# Patient Record
Sex: FEMALE | Race: BLACK OR AFRICAN AMERICAN | Employment: UNEMPLOYED | URBAN - METROPOLITAN AREA
[De-identification: names, ages, dates, MRNs, and addresses within clinical notes are randomized per-mention and may not be internally consistent; named-entity substitution may affect disease eponyms.]

---

## 2018-01-01 ENCOUNTER — TELEPHONE (OUTPATIENT)
Dept: FAMILY MEDICINE CLINIC | Age: 63
End: 2018-01-01

## 2018-01-01 ENCOUNTER — OFFICE VISIT (OUTPATIENT)
Dept: FAMILY MEDICINE CLINIC | Age: 63
End: 2018-01-01

## 2018-01-01 ENCOUNTER — HOME CARE VISIT (OUTPATIENT)
Dept: HOME HEALTH SERVICES | Facility: HOME HEALTH | Age: 63
End: 2018-01-01

## 2018-01-01 ENCOUNTER — HOME HEALTH ADMISSION (OUTPATIENT)
Dept: HOME HEALTH SERVICES | Facility: HOME HEALTH | Age: 63
End: 2018-01-01

## 2018-01-01 VITALS
WEIGHT: 115 LBS | SYSTOLIC BLOOD PRESSURE: 113 MMHG | TEMPERATURE: 96.4 F | OXYGEN SATURATION: 96 % | HEART RATE: 104 BPM | HEIGHT: 60 IN | BODY MASS INDEX: 22.58 KG/M2 | RESPIRATION RATE: 16 BRPM | DIASTOLIC BLOOD PRESSURE: 83 MMHG

## 2018-01-01 VITALS
DIASTOLIC BLOOD PRESSURE: 65 MMHG | WEIGHT: 116 LBS | HEART RATE: 98 BPM | HEIGHT: 60 IN | BODY MASS INDEX: 22.78 KG/M2 | TEMPERATURE: 98.3 F | SYSTOLIC BLOOD PRESSURE: 91 MMHG | RESPIRATION RATE: 18 BRPM | OXYGEN SATURATION: 100 %

## 2018-01-01 DIAGNOSIS — E55.9 HYPOVITAMINOSIS D: ICD-10-CM

## 2018-01-01 DIAGNOSIS — E11.65 TYPE 2 DIABETES MELLITUS WITH HYPERGLYCEMIA, WITH LONG-TERM CURRENT USE OF INSULIN (HCC): Primary | ICD-10-CM

## 2018-01-01 DIAGNOSIS — Z79.4 TYPE 2 DIABETES MELLITUS WITH HYPERGLYCEMIA, WITH LONG-TERM CURRENT USE OF INSULIN (HCC): Primary | ICD-10-CM

## 2018-01-01 DIAGNOSIS — R53.81 PHYSICAL DEBILITY: ICD-10-CM

## 2018-01-01 DIAGNOSIS — R05.9 COUGH: ICD-10-CM

## 2018-01-01 DIAGNOSIS — C25.9 MALIGNANT NEOPLASM OF PANCREAS, UNSPECIFIED LOCATION OF MALIGNANCY (HCC): Primary | ICD-10-CM

## 2018-01-01 DIAGNOSIS — Z79.4 TYPE 2 DIABETES MELLITUS WITH HYPERGLYCEMIA, WITH LONG-TERM CURRENT USE OF INSULIN (HCC): ICD-10-CM

## 2018-01-01 DIAGNOSIS — C25.9 MALIGNANT NEOPLASM OF PANCREAS, UNSPECIFIED LOCATION OF MALIGNANCY (HCC): ICD-10-CM

## 2018-01-01 DIAGNOSIS — Z11.59 NEED FOR HEPATITIS C SCREENING TEST: ICD-10-CM

## 2018-01-01 DIAGNOSIS — F39 MOOD DISORDER (HCC): ICD-10-CM

## 2018-01-01 DIAGNOSIS — E11.65 TYPE 2 DIABETES MELLITUS WITH HYPERGLYCEMIA, WITH LONG-TERM CURRENT USE OF INSULIN (HCC): ICD-10-CM

## 2018-01-01 RX ORDER — ERGOCALCIFEROL 1.25 MG/1
CAPSULE ORAL
Refills: 0 | COMMUNITY
Start: 2018-01-01 | End: 2018-01-01 | Stop reason: SDUPTHER

## 2018-01-01 RX ORDER — ERGOCALCIFEROL 1.25 MG/1
50000 CAPSULE ORAL
Qty: 13 CAP | Refills: 1 | Status: SHIPPED | OUTPATIENT
Start: 2018-01-01 | End: 2019-01-01 | Stop reason: SDUPTHER

## 2018-01-01 RX ORDER — PEN NEEDLE, DIABETIC 30 GX3/16"
NEEDLE, DISPOSABLE MISCELLANEOUS
Qty: 100 PACKAGE | Refills: 3 | Status: SHIPPED | OUTPATIENT
Start: 2018-01-01 | End: 2019-01-01

## 2018-01-01 RX ORDER — GUAIFENESIN DEXTROMETHORPHAN HYDROBROMIDE ORAL SOLUTION 10; 100 MG/5ML; MG/5ML
10 SOLUTION ORAL
Qty: 240 ML | Refills: 0 | Status: SHIPPED | OUTPATIENT
Start: 2018-01-01 | End: 2019-01-01

## 2018-01-01 RX ORDER — BUSPIRONE HYDROCHLORIDE 10 MG/1
10 TABLET ORAL 3 TIMES DAILY
Qty: 90 TAB | Refills: 1 | Status: SHIPPED | OUTPATIENT
Start: 2018-01-01 | End: 2019-01-01 | Stop reason: SDUPTHER

## 2018-01-01 RX ORDER — BUSPIRONE HYDROCHLORIDE 5 MG/1
TABLET ORAL
Refills: 0 | COMMUNITY
Start: 2018-01-01 | End: 2018-01-01 | Stop reason: DRUGHIGH

## 2018-01-01 RX ORDER — EMPAGLIFLOZIN 10 MG/1
10 TABLET, FILM COATED ORAL DAILY
Qty: 30 TAB | Refills: 1 | Status: SHIPPED | OUTPATIENT
Start: 2018-01-01 | End: 2019-01-01 | Stop reason: SDUPTHER

## 2018-01-01 RX ORDER — EMPAGLIFLOZIN 10 MG/1
TABLET, FILM COATED ORAL
Refills: 0 | COMMUNITY
Start: 2018-01-01 | End: 2018-01-01 | Stop reason: SDUPTHER

## 2018-01-01 RX ORDER — INSULIN GLARGINE 100 [IU]/ML
20 INJECTION, SOLUTION SUBCUTANEOUS
Qty: 10 PEN | Refills: 1 | Status: SHIPPED | OUTPATIENT
Start: 2018-01-01 | End: 2019-01-01 | Stop reason: SDUPTHER

## 2018-12-03 NOTE — PROGRESS NOTES
Chief Complaint Patient presents with [facility] Establish Care  Abdominal Pain  
  recent diagnosis of pancreatic cancer  Diabetes 1. Have you been to the ER, urgent care clinic since your last visit? Hospitalized since your last visit? yes 11/13/18-11/22/18 [facility] for abdominal pain 2. Have you seen or consulted any other health care providers outside of the [address] [place] [address] since your last visit? Include any pap smears or colon screening. Yes, Oncology- Dr. Charles Simmons, appt scheduled 12/13/18. HPI Dianne Stout comes in accompanied by her sister-in-law to establish care. Patient has a history of pancreatic cancer. This was recently diagnosed at [facility].  I do not have the records. At the time she was having obstructive jaundice and a stent was placed likely in her biliary system. I will request the records. Jaundice has cleared somewhat. At the time she had nausea and vomiting but this has improved. She denies pruritus. She is able to tolerate orally but there occasionally still has a bit of the nausea. She has been followed up by Dr. Charles Simmons. She does have follow-up appointment and we will request those records. She does states that the she may need to have surgery done. At the moment she denies pain or vomiting. I did encourage her to ensure adequate fluid intake. She does have weight loss and generalized weakness and malaise. Patient has diabetes mellitus. Her blood glucose numbers have been elevated. These are mainly in the 300-500 range. She is on metformin and has also been on Jardiance. While she was in the hospital she was on insulin and this helped bring her blood glucose numbers down. I will start her on Lantus 20 units daily. And she will continue with a Jardiance. Taking 10 mg daily. She will keep a blood glucose log. I will get her previous lab results.   She will follow-up with me in 3 weeks in the clinic. Patient has a history of anxiety. She is on BuSpar 10 mg 3 times a day and feels that this medication helps calm her down. We will continue with this. I will send in a prescription for the medication. Patient has a history of vitamin D deficiency. Is on replacement therapy. Would like a prescription sent in. She takes weekly vitamin D 50,000 units. Prescription was sent in. Past Medical History Past Medical History:  
Diagnosis Date  Cancer ([facility] 11/13/2018 Pancreatic cancer  Diabetes ([facility] Surgical History History reviewed. No pertinent surgical history. Medications Current Outpatient Medications Medication Sig Dispense Refill  insulin glargine (LANTUS,BASAGLAR) 100 unit/mL (3 mL) inpn 20 Units by SubCUTAneous route nightly. 10 Pen 1  JARDIANCE 10 mg tablet Take 1 Tab by mouth daily. 30 Tab 1  VITAMIN D2 50,000 unit capsule Take 1 Cap by mouth every seven (7) days. 13 Cap 1  
 Insulin Needles, Disposable, 31 gauge x 5/16\" ndle Use to administer insulin daily 100 Package 3  
 busPIRone (BUSPAR) 10 mg tablet Take 1 Tab by mouth three (3) times daily. 90 Tab 1 Allergies Not on File Family History History reviewed. No pertinent family history. Social History Social History Socioeconomic History  Marital status: SINGLE Spouse name: Not on file  Number of children: Not on file  Years of education: Not on file  Highest education level: Not on file Social Needs  Financial resource strain: Not on file  Food insecurity - worry: Not on file  Food insecurity - inability: Not on file  Transportation needs - medical: Not on file  Transportation needs - non-medical: Not on file Occupational History  Not on file Tobacco Use  Smoking status: Never Smoker  Smokeless tobacco: Never Used Substance and Sexual Activity  Alcohol use: No  
  Frequency: Never  Drug use:  No  
  Sexual activity: Not on file Other Topics Concern  Not on file Social History Narrative  Not on file Review of Systems Review of Systems - History obtained from sister-in-law and the patient General ROS: positive for  - fatigue, malaise, night sweats and weight loss Psychological ROS: positive for - anxiety and mood swings Ophthalmic ROS: negative ENT ROS: negative Allergy and Immunology ROS: negative Hematological and Lymphatic ROS: negative Endocrine ROS: dm2 Respiratory ROS: no cough, shortness of breath, or wheezing Cardiovascular ROS: no chest pain or dyspnea on exertion Gastrointestinal ROS: positive for - appetite loss 
negative for - blood in stools, constipation or gas/bloating Genito-Urinary ROS: negative Musculoskeletal ROS: positive for - muscle pain Neurological ROS: no TIA or stroke symptoms Dermatological ROS: negative Vital Signs Visit Vitals /83 (BP 1 Location: Left arm, BP Patient Position: Sitting) Pulse (!) 104 Temp 96.4 °F (35.8 °C) (Oral) Resp 16 Ht 5' (1.524 m) Wt 115 lb (52.2 kg) SpO2 96% BMI 22.46 kg/m² Physical Exam 
Physical Examination: General appearance - oriented to person, place, and time, acyanotic, in no respiratory distress, anxious and chronically ill appearing Mental status - alert, oriented to person, place, and time, anxious Eyes - pupils equal and reactive, extraocular eye movements intact, scleral jaundice Mouth - mucous membranes moist, pharynx normal without lesions Neck - supple, no significant adenopathy Lymphatics - no palpable lymphadenopathy Chest - clear to auscultation, no wheezes, rales or rhonchi, symmetric air entry, no tachypnea, retractions or cyanosis Heart - S1 and S2 normal 
Abdomen -mildly distended with slight discomfort to palpation right upper quadrant. No obvious organomegaly Back exam - limited range of motion Neurological - motor and sensory grossly normal bilaterally Musculoskeletal - osteoarthritic changes noted in both hands Extremities - intact peripheral pulses Results No results found for this or any previous visit. ASSESSMENT and PLAN 
  ICD-10-CM ICD-9-CM 1. Malignant neoplasm of pancreas, unspecified location of malignancy ([facility] C25.9 157.9 REFERRAL TO ONCOLOGY 2. Type 2 diabetes mellitus with hyperglycemia, with long-term current use of insulin ([facility]) E11.65 250.00 insulin glargine (LANTUS,BASAGLAR) 100 unit/mL (3 mL) inpn  
 Z79.4 790.29 JARDIANCE 10 mg tablet V58.67 Insulin Needles, Disposable, 31 gauge x 5/16\" ndle 3. Mood disorder ([facility]) F39 296.90 busPIRone (BUSPAR) 10 mg tablet 4. Hypovitaminosis D E55.9 268.9 VITAMIN D2 50,000 unit capsule  
 
lab results and schedule of future lab studies reviewed with patient 
reviewed diet, exercise and weight control 
cardiovascular risk and specific lipid/LDL goals reviewed 
reviewed medications and side effects in detail 
specific diabetic recommendations: low cholesterol diet, weight control and daily exercise discussed, home glucose monitoring emphasized, all medications, side effects and compliance discussed carefully, annual eye examinations at Ophthalmology discussed, glycohemoglobin and other lab monitoring discussed and long term diabetic complications discussed 
radiology results and schedule of future radiology studies reviewed with patient I have discussed the diagnosis with the patient and the intended plan of care as seen in the above orders. The patient has received an after-visit summary and questions were answered concerning future plans. I have discussed medication, side effects, and warnings with the patient in detail. The patient verbalized understanding and is in agreement with the plan of care. The patient will contact the office with any additional concerns.  
 
Jaquelin Hernandez MD

## 2018-12-24 NOTE — PROGRESS NOTES
Chief Complaint   Patient presents with    Follow-up     DM2 and Pancreatic CA     1. Have you been to the ER, urgent care clinic since your last visit? Hospitalized since your last visit? No    2. Have you seen or consulted any other health care providers outside of the [address] [place] [address] since your last visit? Include any pap smears or colon screening. No    HPI  Rodrick Ann  comes in accompanied by the sister-in-law for follow-up care. She has been seen by oncologist and is currently undergoing chemotherapy. Has been tolerating this well. Does have a follow-up appointment later on this week. She also does get blood drawn this week. Patient has diabetes mellitus type 2. She has been checking her blood glucose numbers. Please have ranged between 150 and 180 fasting. She is on Lantus. I do not have a previous lab results are still await her records. As such I will order blood work on her. I would want her to go up on Lantus to 23 units daily. She is on Jardiance and we will continue with this. Patient is on BuSpar for anxiety. She is tolerating the medication. Patient currently resides with her brother and sister-in-law. She will soon get back to her own house. She will need help with medication management. I will place a referral to home health for assessment of her home health needs. She does have generalized weakness and malaise with physical debility following the chemotherapy. No nausea or vomiting however. Patient has a cough that is dry and nonproductive. No chest pain, wheeze, hemoptysis or shortness of breath. Denies fever or chills. Would like some medication for cough relief. The prescription is sent in to the pharmacy. Past Medical History  Past Medical History:   Diagnosis Date    Cancer ([facility] 11/13/2018    Pancreatic cancer    Diabetes [facility])        Surgical History  History reviewed. No pertinent surgical history.      Medications  Current Outpatient Medications   Medication Sig Dispense Refill    insulin glargine (LANTUS,BASAGLAR) 100 unit/mL (3 mL) inpn 20 Units by SubCUTAneous route nightly. 10 Pen 1    JARDIANCE 10 mg tablet Take 1 Tab by mouth daily. 30 Tab 1    VITAMIN D2 50,000 unit capsule Take 1 Cap by mouth every seven (7) days. 13 Cap 1    Insulin Needles, Disposable, 31 gauge x 5/16\" ndle Use to administer insulin daily 100 Package 3    busPIRone (BUSPAR) 10 mg tablet Take 1 Tab by mouth three (3) times daily. 90 Tab 1       Allergies  Not on File    Family History  History reviewed. No pertinent family history. Social History  Social History     Socioeconomic History    Marital status: SINGLE     Spouse name: Not on file    Number of children: Not on file    Years of education: Not on file    Highest education level: Not on file   Social Needs    Financial resource strain: Not on file    Food insecurity - worry: Not on file    Food insecurity - inability: Not on file   [employer] needs - medical: Not on file   [employer] needs - non-medical: Not on file   Occupational History    Not on file   Tobacco Use    Smoking status: Never Smoker    Smokeless tobacco: Never Used   Substance and Sexual Activity    Alcohol use: No     Frequency: Never    Drug use: No    Sexual activity: Not on file   Other Topics Concern    Not on file   Social History Narrative    Not on file       Review of Systems  Review of Systems -review of systems negative except as noted above in the HPI.     Vital Signs  Visit Vitals  BP 91/65 (BP 1 Location: Left arm, BP Patient Position: Sitting)   Pulse 98   Temp 98.3 °F (36.8 °C) (Oral)   Resp 18   Ht 5' (1.524 m)   Wt 116 lb (52.6 kg)   SpO2 100%   BMI 22.65 kg/m²         Physical Exam  Physical Examination: General appearance - oriented to person, place, and time, acyanotic, in no respiratory distress and chronically ill appearing  Mental status - affect appropriate to mood  Eyes - sclera anicteric  Neck - supple, no significant adenopathy  Lymphatics - no palpable lymphadenopathy  Chest - clear to auscultation, no wheezes, rales or rhonchi, symmetric air entry  Heart - S1 and S2 normal  Abdomen - no rebound tenderness noted  Neurological - motor and sensory grossly normal bilaterally  Musculoskeletal - osteoarthritic changes noted in both hands  Extremities - intact peripheral pulses    Results  No results found for this or any previous visit. ASSESSMENT and PLAN    ICD-10-CM ICD-9-CM    1. Type 2 diabetes mellitus with hyperglycemia, with long-term current use of insulin ([facility]) E11.65 250.00 CBC WITH AUTOMATED DIFF    Z79.4 790.29 LIPID PANEL     N86.34 METABOLIC PANEL, COMPREHENSIVE      AMB POC URINE, MICROALBUMIN, SEMIQUANTITATIVE      AMB POC HEMOGLOBIN A1C   2. Malignant neoplasm of pancreas, unspecified location of malignancy ([facility] C25.9 157.9 REFERRAL TO [place] [address]   3. Cough R05 786.2 guaiFENesin-dextromethorphan (GUAIFENESIN-DM)  mg/5 mL liqd   4. Physical debility R53.81 799.3 REFERRAL TO HOME HEALTH   5. Need for hepatitis C screening test Z11.59 V73.89 HEPATITIS C AB     lab results and schedule of future lab studies reviewed with patient  reviewed diet, exercise and weight control  reviewed medications and side effects in detail  specific diabetic recommendations: low cholesterol diet, weight control and daily exercise discussed, home glucose monitoring emphasized, all medications, side effects and compliance discussed carefully, glycohemoglobin and other lab monitoring discussed and long term diabetic complications discussed    I have discussed the diagnosis with the patient and the intended plan of care as seen in the above orders. The patient has received an after-visit summary and questions were answered concerning future plans. I have discussed medication, side effects, and warnings with the patient in detail.  The patient verbalized understanding and is in agreement with the plan of care. The patient will contact the office with any additional concerns.     Maryse Salomon MD

## 2018-12-27 NOTE — TELEPHONE ENCOUNTER
Lakisha from [address] called stating she needs to delay care until Saturday. They will start home health Sat.  Per sister in law (caregiver)    [phone]

## 2019-01-01 ENCOUNTER — APPOINTMENT (OUTPATIENT)
Dept: NON INVASIVE DIAGNOSTICS | Age: 64
DRG: 284 | End: 2019-01-01
Attending: HOSPITALIST
Payer: MEDICAID

## 2019-01-01 ENCOUNTER — LAB ONLY (OUTPATIENT)
Dept: FAMILY MEDICINE CLINIC | Age: 64
End: 2019-01-01

## 2019-01-01 ENCOUNTER — HOSPITAL ENCOUNTER (OUTPATIENT)
Dept: LAB | Age: 64
Discharge: HOME | End: 2019-05-08
Payer: MEDICAID

## 2019-01-01 ENCOUNTER — APPOINTMENT (OUTPATIENT)
Dept: CT IMAGING | Age: 64
DRG: 284 | End: 2019-01-01
Attending: INTERNAL MEDICINE
Payer: MEDICAID

## 2019-01-01 ENCOUNTER — OFFICE VISIT (OUTPATIENT)
Dept: FAMILY MEDICINE CLINIC | Age: 64
End: 2019-01-01

## 2019-01-01 ENCOUNTER — HOME CARE VISIT (OUTPATIENT)
Dept: SCHEDULING | Facility: HOME HEALTH | Age: 64
End: 2019-01-01
Payer: MEDICAID

## 2019-01-01 ENCOUNTER — APPOINTMENT (OUTPATIENT)
Dept: ULTRASOUND IMAGING | Age: 64
DRG: 284 | End: 2019-01-01
Attending: EMERGENCY MEDICINE
Payer: MEDICAID

## 2019-01-01 ENCOUNTER — APPOINTMENT (OUTPATIENT)
Dept: GENERAL RADIOLOGY | Age: 64
DRG: 284 | End: 2019-01-01
Attending: INTERNAL MEDICINE
Payer: MEDICAID

## 2019-01-01 ENCOUNTER — TELEPHONE (OUTPATIENT)
Dept: FAMILY MEDICINE CLINIC | Age: 64
End: 2019-01-01

## 2019-01-01 ENCOUNTER — HOSPITAL ENCOUNTER (INPATIENT)
Dept: VASCULAR SURGERY | Age: 64
Discharge: HOME | DRG: 284 | End: 2019-07-27
Attending: INTERNAL MEDICINE
Payer: MEDICAID

## 2019-01-01 ENCOUNTER — APPOINTMENT (OUTPATIENT)
Dept: GENERAL RADIOLOGY | Age: 64
DRG: 284 | End: 2019-01-01
Attending: HOSPITALIST
Payer: MEDICAID

## 2019-01-01 ENCOUNTER — HOME CARE VISIT (OUTPATIENT)
Dept: HOSPICE | Facility: HOSPICE | Age: 64
End: 2019-01-01
Payer: MEDICAID

## 2019-01-01 ENCOUNTER — APPOINTMENT (OUTPATIENT)
Dept: CT IMAGING | Age: 64
DRG: 284 | End: 2019-01-01
Attending: PHYSICIAN ASSISTANT
Payer: MEDICAID

## 2019-01-01 ENCOUNTER — HOSPICE ADMISSION (OUTPATIENT)
Dept: HOSPICE | Facility: HOSPICE | Age: 64
End: 2019-01-01
Payer: MEDICAID

## 2019-01-01 ENCOUNTER — APPOINTMENT (OUTPATIENT)
Dept: MRI IMAGING | Age: 64
DRG: 284 | End: 2019-01-01
Attending: NURSE PRACTITIONER
Payer: MEDICAID

## 2019-01-01 ENCOUNTER — HOSPITAL ENCOUNTER (INPATIENT)
Age: 64
LOS: 14 days | Discharge: NURSING, REHAB OR LONG-TERM CARE | DRG: 284 | End: 2019-08-08
Attending: EMERGENCY MEDICINE | Admitting: HOSPITALIST
Payer: MEDICAID

## 2019-01-01 ENCOUNTER — APPOINTMENT (OUTPATIENT)
Dept: GENERAL RADIOLOGY | Age: 64
DRG: 284 | End: 2019-01-01
Attending: EMERGENCY MEDICINE
Payer: MEDICAID

## 2019-01-01 ENCOUNTER — HOSPICE ADMISSION (OUTPATIENT)
Dept: HOSPICE | Facility: HOSPICE | Age: 64
End: 2019-01-01

## 2019-01-01 VITALS
WEIGHT: 116.8 LBS | DIASTOLIC BLOOD PRESSURE: 58 MMHG | RESPIRATION RATE: 18 BRPM | HEART RATE: 94 BPM | HEIGHT: 60 IN | BODY MASS INDEX: 22.93 KG/M2 | SYSTOLIC BLOOD PRESSURE: 89 MMHG | OXYGEN SATURATION: 99 % | TEMPERATURE: 97.6 F

## 2019-01-01 VITALS
RESPIRATION RATE: 16 BRPM | OXYGEN SATURATION: 97 % | HEART RATE: 106 BPM | SYSTOLIC BLOOD PRESSURE: 110 MMHG | TEMPERATURE: 96.6 F | DIASTOLIC BLOOD PRESSURE: 62 MMHG

## 2019-01-01 VITALS
DIASTOLIC BLOOD PRESSURE: 76 MMHG | TEMPERATURE: 97.8 F | WEIGHT: 154.1 LBS | HEART RATE: 92 BPM | RESPIRATION RATE: 14 BRPM | OXYGEN SATURATION: 97 % | HEIGHT: 55 IN | SYSTOLIC BLOOD PRESSURE: 115 MMHG | BODY MASS INDEX: 35.66 KG/M2

## 2019-01-01 VITALS
WEIGHT: 130 LBS | SYSTOLIC BLOOD PRESSURE: 82 MMHG | HEART RATE: 86 BPM | HEIGHT: 60 IN | DIASTOLIC BLOOD PRESSURE: 53 MMHG | TEMPERATURE: 98.2 F | OXYGEN SATURATION: 97 % | BODY MASS INDEX: 25.52 KG/M2 | RESPIRATION RATE: 16 BRPM

## 2019-01-01 DIAGNOSIS — Z79.4 TYPE 2 DIABETES MELLITUS WITH HYPERGLYCEMIA, WITH LONG-TERM CURRENT USE OF INSULIN (HCC): ICD-10-CM

## 2019-01-01 DIAGNOSIS — E80.6 HYPERBILIRUBINEMIA: ICD-10-CM

## 2019-01-01 DIAGNOSIS — E80.6 HYPERBILIRUBINEMIA: Primary | ICD-10-CM

## 2019-01-01 DIAGNOSIS — E11.65 TYPE 2 DIABETES MELLITUS WITH HYPERGLYCEMIA, WITH LONG-TERM CURRENT USE OF INSULIN (HCC): ICD-10-CM

## 2019-01-01 DIAGNOSIS — F39 MOOD DISORDER (HCC): ICD-10-CM

## 2019-01-01 DIAGNOSIS — Z01.89 ENCOUNTER FOR LABORATORY TEST: Primary | ICD-10-CM

## 2019-01-01 DIAGNOSIS — R17 JAUNDICE: ICD-10-CM

## 2019-01-01 DIAGNOSIS — C25.9 PANCREATIC ADENOCARCINOMA (HCC): Primary | ICD-10-CM

## 2019-01-01 DIAGNOSIS — K21.9 GASTROESOPHAGEAL REFLUX DISEASE, ESOPHAGITIS PRESENCE NOT SPECIFIED: ICD-10-CM

## 2019-01-01 DIAGNOSIS — E55.9 HYPOVITAMINOSIS D: ICD-10-CM

## 2019-01-01 DIAGNOSIS — C25.9 MALIGNANT NEOPLASM OF PANCREAS, UNSPECIFIED LOCATION OF MALIGNANCY (HCC): ICD-10-CM

## 2019-01-01 DIAGNOSIS — Z12.39 SCREENING BREAST EXAMINATION: ICD-10-CM

## 2019-01-01 DIAGNOSIS — R53.83 MALAISE AND FATIGUE: ICD-10-CM

## 2019-01-01 DIAGNOSIS — C25.9 MALIGNANT NEOPLASM OF PANCREAS, UNSPECIFIED LOCATION OF MALIGNANCY (HCC): Primary | ICD-10-CM

## 2019-01-01 DIAGNOSIS — R53.81 MALAISE AND FATIGUE: ICD-10-CM

## 2019-01-01 DIAGNOSIS — K72.90 LIVER FAILURE WITHOUT HEPATIC COMA, UNSPECIFIED CHRONICITY (HCC): ICD-10-CM

## 2019-01-01 DIAGNOSIS — E11.9 COMPREHENSIVE DIABETIC FOOT EXAMINATION, TYPE 2 DM, ENCOUNTER FOR (HCC): ICD-10-CM

## 2019-01-01 DIAGNOSIS — K59.00 CONSTIPATION, UNSPECIFIED CONSTIPATION TYPE: ICD-10-CM

## 2019-01-01 LAB
ABO + RH BLD: NORMAL
ABO + RH BLD: NORMAL
ALBUMIN SERPL-MCNC: 1.4 G/DL (ref 3.4–5)
ALBUMIN SERPL-MCNC: 1.7 G/DL (ref 3.4–5)
ALBUMIN SERPL-MCNC: 1.9 G/DL (ref 3.4–5)
ALBUMIN SERPL-MCNC: 1.9 G/DL (ref 3.4–5)
ALBUMIN SERPL-MCNC: 2.1 G/DL (ref 3.4–5)
ALBUMIN SERPL-MCNC: 2.3 G/DL (ref 3.4–5)
ALBUMIN SERPL-MCNC: 2.4 G/DL (ref 3.4–5)
ALBUMIN SERPL-MCNC: 2.4 G/DL (ref 3.4–5)
ALBUMIN SERPL-MCNC: 2.6 G/DL (ref 3.4–5)
ALBUMIN SERPL-MCNC: 2.8 G/DL (ref 3.4–5)
ALBUMIN SERPL-MCNC: 3 G/DL (ref 3.4–5)
ALBUMIN SERPL-MCNC: 3.1 G/DL (ref 3.4–5)
ALBUMIN SERPL-MCNC: 3.4 G/DL (ref 3.4–5)
ALBUMIN/GLOB SERPL: 0.4 {RATIO} (ref 0.8–1.7)
ALBUMIN/GLOB SERPL: 0.6 {RATIO} (ref 0.8–1.7)
ALBUMIN/GLOB SERPL: 0.6 {RATIO} (ref 0.8–1.7)
ALBUMIN/GLOB SERPL: 0.7 {RATIO} (ref 0.8–1.7)
ALBUMIN/GLOB SERPL: 0.7 {RATIO} (ref 0.8–1.7)
ALBUMIN/GLOB SERPL: 0.8 {RATIO} (ref 0.8–1.7)
ALBUMIN/GLOB SERPL: 0.9 {RATIO} (ref 0.8–1.7)
ALBUMIN/GLOB SERPL: 1 {RATIO} (ref 0.8–1.7)
ALBUMIN/GLOB SERPL: 1.1 {RATIO} (ref 0.8–1.7)
ALBUMIN/GLOB SERPL: 1.1 {RATIO} (ref 0.8–1.7)
ALBUMIN/GLOB SERPL: 1.3 {RATIO} (ref 0.8–1.7)
ALBUMIN/GLOB SERPL: 1.3 {RATIO} (ref 0.8–1.7)
ALBUMIN/GLOB SERPL: 1.6 {RATIO} (ref 0.8–1.7)
ALBUMIN/GLOB SERPL: 1.7 {RATIO} (ref 0.8–1.7)
ALBUMIN/GLOB SERPL: 1.9 {RATIO} (ref 0.8–1.7)
ALP SERPL-CCNC: 107 U/L (ref 45–117)
ALP SERPL-CCNC: 108 U/L (ref 45–117)
ALP SERPL-CCNC: 109 U/L (ref 45–117)
ALP SERPL-CCNC: 133 U/L (ref 45–117)
ALP SERPL-CCNC: 134 U/L (ref 45–117)
ALP SERPL-CCNC: 70 U/L (ref 45–117)
ALP SERPL-CCNC: 71 U/L (ref 45–117)
ALP SERPL-CCNC: 74 U/L (ref 45–117)
ALP SERPL-CCNC: 75 U/L (ref 45–117)
ALP SERPL-CCNC: 86 U/L (ref 45–117)
ALP SERPL-CCNC: 87 U/L (ref 45–117)
ALP SERPL-CCNC: 90 U/L (ref 45–117)
ALP SERPL-CCNC: 91 U/L (ref 45–117)
ALP SERPL-CCNC: 91 U/L (ref 45–117)
ALP SERPL-CCNC: 95 U/L (ref 45–117)
ALP SERPL-CCNC: 99 U/L (ref 45–117)
ALP SERPL-CCNC: 99 U/L (ref 45–117)
ALT SERPL-CCNC: 19 U/L (ref 13–56)
ALT SERPL-CCNC: 21 U/L (ref 13–56)
ALT SERPL-CCNC: 22 U/L (ref 13–56)
ALT SERPL-CCNC: 23 U/L (ref 13–56)
ALT SERPL-CCNC: 24 U/L (ref 13–56)
ALT SERPL-CCNC: 28 U/L (ref 13–56)
ALT SERPL-CCNC: 29 U/L (ref 13–56)
ALT SERPL-CCNC: 32 U/L (ref 13–56)
ALT SERPL-CCNC: 36 U/L (ref 13–56)
ALT SERPL-CCNC: 37 U/L (ref 13–56)
ALT SERPL-CCNC: 38 U/L (ref 13–56)
ALT SERPL-CCNC: 50 U/L (ref 13–56)
AMMONIA PLAS-SCNC: 134 UMOL/L (ref 11–32)
AMMONIA PLAS-SCNC: 24 UMOL/L (ref 11–32)
AMMONIA PLAS-SCNC: 30 UMOL/L (ref 11–32)
AMMONIA PLAS-SCNC: 43 UMOL/L (ref 11–32)
AMMONIA PLAS-SCNC: 47 UMOL/L (ref 11–32)
AMMONIA PLAS-SCNC: 48 UMOL/L (ref 11–32)
AMMONIA PLAS-SCNC: 48 UMOL/L (ref 11–32)
AMMONIA PLAS-SCNC: 53 UMOL/L (ref 11–32)
AMMONIA PLAS-SCNC: 53 UMOL/L (ref 11–32)
AMMONIA PLAS-SCNC: 85 UMOL/L (ref 11–32)
AMMONIA PLAS-SCNC: 93 UMOL/L (ref 11–32)
AMMONIA PLAS-SCNC: <10 UMOL/L (ref 11–32)
ANION GAP SERPL CALC-SCNC: 10 MMOL/L (ref 3–18)
ANION GAP SERPL CALC-SCNC: 10 MMOL/L (ref 3–18)
ANION GAP SERPL CALC-SCNC: 11 MMOL/L (ref 3–18)
ANION GAP SERPL CALC-SCNC: 11 MMOL/L (ref 3–18)
ANION GAP SERPL CALC-SCNC: 12 MMOL/L (ref 3–18)
ANION GAP SERPL CALC-SCNC: 5 MMOL/L (ref 3–18)
ANION GAP SERPL CALC-SCNC: 6 MMOL/L (ref 3–18)
ANION GAP SERPL CALC-SCNC: 7 MMOL/L (ref 3–18)
ANION GAP SERPL CALC-SCNC: 8 MMOL/L (ref 3–18)
ANION GAP SERPL CALC-SCNC: 8 MMOL/L (ref 3–18)
ANION GAP SERPL CALC-SCNC: 9 MMOL/L (ref 3–18)
ANION GAP SERPL CALC-SCNC: 9 MMOL/L (ref 3–18)
APPEARANCE UR: ABNORMAL
APPEARANCE UR: ABNORMAL
APTT PPP: 48.1 SEC (ref 23–36.4)
APTT PPP: 49.2 SEC (ref 23–36.4)
APTT PPP: 56.7 SEC (ref 23–36.4)
APTT PPP: 63.5 SEC (ref 23–36.4)
APTT PPP: 63.6 SEC (ref 23–36.4)
APTT PPP: 66.4 SEC (ref 23–36.4)
APTT PPP: 67.1 SEC (ref 23–36.4)
APTT PPP: 69 SEC (ref 23–36.4)
APTT PPP: 69.3 SEC (ref 23–36.4)
APTT PPP: 70.2 SEC (ref 23–36.4)
APTT PPP: 73.4 SEC (ref 23–36.4)
APTT PPP: 74.2 SEC (ref 23–36.4)
APTT PPP: >180 SEC (ref 23–36.4)
ARTERIAL PATENCY WRIST A: YES
AST SERPL-CCNC: 105 U/L (ref 10–38)
AST SERPL-CCNC: 37 U/L (ref 10–38)
AST SERPL-CCNC: 38 U/L (ref 10–38)
AST SERPL-CCNC: 40 U/L (ref 10–38)
AST SERPL-CCNC: 41 U/L (ref 10–38)
AST SERPL-CCNC: 41 U/L (ref 10–38)
AST SERPL-CCNC: 43 U/L (ref 10–38)
AST SERPL-CCNC: 43 U/L (ref 15–37)
AST SERPL-CCNC: 44 U/L (ref 10–38)
AST SERPL-CCNC: 46 U/L (ref 10–38)
AST SERPL-CCNC: 48 U/L (ref 10–38)
AST SERPL-CCNC: 49 U/L (ref 10–38)
AST SERPL-CCNC: 51 U/L (ref 10–38)
AST SERPL-CCNC: 59 U/L (ref 10–38)
AST SERPL-CCNC: 73 U/L (ref 10–38)
AST SERPL-CCNC: 74 U/L (ref 10–38)
AST SERPL-CCNC: 81 U/L (ref 10–38)
ATRIAL RATE: 97 BPM
BACTERIA SPEC CULT: NORMAL
BACTERIA URNS QL MICRO: ABNORMAL /HPF
BACTERIA URNS QL MICRO: ABNORMAL /HPF
BASE DEFICIT BLD-SCNC: 2 MMOL/L
BASOPHILS # BLD: 0 K/UL (ref 0–0.06)
BASOPHILS # BLD: 0 K/UL (ref 0–0.1)
BASOPHILS NFR BLD: 0 % (ref 0–2)
BASOPHILS NFR BLD: 0 % (ref 0–3)
BASOPHILS NFR BLD: 1 % (ref 0–2)
BDY SITE: ABNORMAL
BILIRUB DIRECT SERPL-MCNC: 10 MG/DL (ref 0–0.2)
BILIRUB DIRECT SERPL-MCNC: 10.6 MG/DL (ref 0–0.2)
BILIRUB DIRECT SERPL-MCNC: 10.7 MG/DL (ref 0–0.2)
BILIRUB DIRECT SERPL-MCNC: 10.9 MG/DL (ref 0–0.2)
BILIRUB DIRECT SERPL-MCNC: 12.4 MG/DL (ref 0–0.2)
BILIRUB DIRECT SERPL-MCNC: 9.5 MG/DL (ref 0–0.2)
BILIRUB SERPL-MCNC: 0.3 MG/DL (ref 0.2–1)
BILIRUB SERPL-MCNC: 11.6 MG/DL (ref 0.2–1)
BILIRUB SERPL-MCNC: 12.1 MG/DL (ref 0.2–1)
BILIRUB SERPL-MCNC: 12.1 MG/DL (ref 0.2–1)
BILIRUB SERPL-MCNC: 12.3 MG/DL (ref 0.2–1)
BILIRUB SERPL-MCNC: 12.8 MG/DL (ref 0.2–1)
BILIRUB SERPL-MCNC: 13.1 MG/DL (ref 0.2–1)
BILIRUB SERPL-MCNC: 14.3 MG/DL (ref 0.2–1)
BILIRUB SERPL-MCNC: 15.1 MG/DL (ref 0.2–1)
BILIRUB SERPL-MCNC: 15.3 MG/DL (ref 0.2–1)
BILIRUB SERPL-MCNC: 15.3 MG/DL (ref 0.2–1)
BILIRUB SERPL-MCNC: 15.6 MG/DL (ref 0.2–1)
BILIRUB SERPL-MCNC: 15.6 MG/DL (ref 0.2–1)
BILIRUB SERPL-MCNC: 15.8 MG/DL (ref 0.2–1)
BILIRUB SERPL-MCNC: 16.2 MG/DL (ref 0.2–1)
BILIRUB SERPL-MCNC: 16.5 MG/DL (ref 0.2–1)
BILIRUB SERPL-MCNC: 19.6 MG/DL (ref 0.2–1)
BILIRUB UR QL: ABNORMAL
BILIRUB UR QL: ABNORMAL
BLD PROD TYP BPU: NORMAL
BLOOD GROUP ANTIBODIES SERPL: NORMAL
BLOOD GROUP ANTIBODIES SERPL: NORMAL
BNP SERPL-MCNC: 941 PG/ML (ref 0–900)
BODY TEMPERATURE: 37
BPU ID: NORMAL
BUN SERPL-MCNC: 3 MG/DL (ref 7–18)
BUN SERPL-MCNC: 4 MG/DL (ref 7–18)
BUN SERPL-MCNC: 5 MG/DL (ref 7–18)
BUN SERPL-MCNC: 8 MG/DL (ref 7–18)
BUN/CREAT SERPL: 12 (ref 12–20)
BUN/CREAT SERPL: 3 (ref 12–20)
BUN/CREAT SERPL: 4 (ref 12–20)
BUN/CREAT SERPL: 5 (ref 12–20)
BUN/CREAT SERPL: 6 (ref 12–20)
BUN/CREAT SERPL: 7 (ref 12–20)
BUN/CREAT SERPL: 8 (ref 12–20)
CALCIUM SERPL-MCNC: 7.3 MG/DL (ref 8.5–10.1)
CALCIUM SERPL-MCNC: 7.9 MG/DL (ref 8.5–10.1)
CALCIUM SERPL-MCNC: 8 MG/DL (ref 8.5–10.1)
CALCIUM SERPL-MCNC: 8 MG/DL (ref 8.5–10.1)
CALCIUM SERPL-MCNC: 8.2 MG/DL (ref 8.5–10.1)
CALCIUM SERPL-MCNC: 8.3 MG/DL (ref 8.5–10.1)
CALCIUM SERPL-MCNC: 8.6 MG/DL (ref 8.5–10.1)
CALCIUM SERPL-MCNC: 8.8 MG/DL (ref 8.5–10.1)
CALCULATED P AXIS, ECG09: 80 DEGREES
CALCULATED R AXIS, ECG10: 61 DEGREES
CALCULATED T AXIS, ECG11: 62 DEGREES
CALLED TO:,BCALL1: NORMAL
CHLORIDE SERPL-SCNC: 103 MMOL/L (ref 100–111)
CHLORIDE SERPL-SCNC: 104 MMOL/L (ref 100–111)
CHLORIDE SERPL-SCNC: 105 MMOL/L (ref 100–111)
CHLORIDE SERPL-SCNC: 105 MMOL/L (ref 100–111)
CHLORIDE SERPL-SCNC: 106 MMOL/L (ref 100–111)
CHLORIDE SERPL-SCNC: 107 MMOL/L (ref 100–111)
CHLORIDE SERPL-SCNC: 108 MMOL/L (ref 100–111)
CHLORIDE SERPL-SCNC: 109 MMOL/L (ref 100–111)
CHLORIDE SERPL-SCNC: 110 MMOL/L (ref 100–108)
CHLORIDE SERPL-SCNC: 115 MMOL/L (ref 100–111)
CHLORIDE SERPL-SCNC: 116 MMOL/L (ref 100–111)
CHOLEST SERPL-MCNC: 102 MG/DL
CO2 SERPL-SCNC: 20 MMOL/L (ref 21–32)
CO2 SERPL-SCNC: 20 MMOL/L (ref 21–32)
CO2 SERPL-SCNC: 21 MMOL/L (ref 21–32)
CO2 SERPL-SCNC: 21 MMOL/L (ref 21–32)
CO2 SERPL-SCNC: 22 MMOL/L (ref 21–32)
CO2 SERPL-SCNC: 22 MMOL/L (ref 21–32)
CO2 SERPL-SCNC: 23 MMOL/L (ref 21–32)
CO2 SERPL-SCNC: 23 MMOL/L (ref 21–32)
CO2 SERPL-SCNC: 25 MMOL/L (ref 21–32)
CO2 SERPL-SCNC: 26 MMOL/L (ref 21–32)
CO2 SERPL-SCNC: 26 MMOL/L (ref 21–32)
CO2 SERPL-SCNC: 27 MMOL/L (ref 21–32)
CO2 SERPL-SCNC: 28 MMOL/L (ref 21–32)
COLOR UR: ABNORMAL
COLOR UR: ABNORMAL
CREAT SERPL-MCNC: 0.52 MG/DL (ref 0.6–1.3)
CREAT SERPL-MCNC: 0.54 MG/DL (ref 0.6–1.3)
CREAT SERPL-MCNC: 0.57 MG/DL (ref 0.6–1.3)
CREAT SERPL-MCNC: 0.58 MG/DL (ref 0.6–1.3)
CREAT SERPL-MCNC: 0.59 MG/DL (ref 0.6–1.3)
CREAT SERPL-MCNC: 0.61 MG/DL (ref 0.6–1.3)
CREAT SERPL-MCNC: 0.69 MG/DL (ref 0.6–1.3)
CREAT SERPL-MCNC: 0.71 MG/DL (ref 0.6–1.3)
CREAT SERPL-MCNC: 0.77 MG/DL (ref 0.6–1.3)
CREAT SERPL-MCNC: 0.78 MG/DL (ref 0.6–1.3)
CREAT SERPL-MCNC: 0.79 MG/DL (ref 0.6–1.3)
CREAT SERPL-MCNC: 0.91 MG/DL (ref 0.6–1.3)
CREAT SERPL-MCNC: 0.97 MG/DL (ref 0.6–1.3)
CREAT SERPL-MCNC: 0.98 MG/DL (ref 0.6–1.3)
CREAT SERPL-MCNC: 1 MG/DL (ref 0.6–1.3)
CREAT SERPL-MCNC: 1.07 MG/DL (ref 0.6–1.3)
CROSSMATCH RESULT,%XM: NORMAL
DIAGNOSIS, 93000: NORMAL
DIFFERENTIAL METHOD BLD: ABNORMAL
ECHO AO ROOT DIAM: 2.52 CM
ECHO LA AREA 4C: 10.5 CM2
ECHO LA VOL 2C: 32.23 ML (ref 22–52)
ECHO LA VOL 4C: 19.41 ML (ref 22–52)
ECHO LA VOL BP: 26.65 ML (ref 22–52)
ECHO LA VOL/BSA BIPLANE: 18.1 ML/M2 (ref 16–28)
ECHO LA VOLUME INDEX A2C: 21.89 ML/M2 (ref 16–28)
ECHO LA VOLUME INDEX A4C: 13.18 ML/M2 (ref 16–28)
ECHO LV E' LATERAL VELOCITY: 9.28 CM/S
ECHO LV E' SEPTAL VELOCITY: 7.4 CM/S
ECHO LV EDV A2C: 47.2 ML
ECHO LV EDV A4C: 43.7 ML
ECHO LV EDV BP: 46 ML (ref 56–104)
ECHO LV EDV INDEX A4C: 29.7 ML/M2
ECHO LV EDV INDEX BP: 31.2 ML/M2
ECHO LV EDV NDEX A2C: 32.1 ML/M2
ECHO LV EJECTION FRACTION A2C: 79 %
ECHO LV EJECTION FRACTION A4C: 83 %
ECHO LV EJECTION FRACTION BIPLANE: 81.6 % (ref 55–100)
ECHO LV ESV A2C: 9.8 ML
ECHO LV ESV A4C: 7.3 ML
ECHO LV ESV BP: 8.5 ML (ref 19–49)
ECHO LV ESV INDEX A2C: 6.7 ML/M2
ECHO LV ESV INDEX A4C: 5 ML/M2
ECHO LV ESV INDEX BP: 5.8 ML/M2
ECHO LV INTERNAL DIMENSION DIASTOLIC: 3.84 CM (ref 3.9–5.3)
ECHO LV INTERNAL DIMENSION SYSTOLIC: 2.26 CM
ECHO LV IVSD: 0.78 CM (ref 0.6–0.9)
ECHO LV MASS 2D: 86.5 G (ref 67–162)
ECHO LV MASS INDEX 2D: 58.7 G/M2 (ref 43–95)
ECHO LV POSTERIOR WALL DIASTOLIC: 0.72 CM (ref 0.6–0.9)
ECHO LVOT DIAM: 1.64 CM
ECHO LVOT PEAK GRADIENT: 3.3 MMHG
ECHO LVOT PEAK VELOCITY: 90.15 CM/S
ECHO LVOT VTI: 16.65 CM
ECHO MV A VELOCITY: 71.16 CM/S
ECHO MV E DECELERATION TIME (DT): 49.8 MS
ECHO MV E VELOCITY: 63.71 CM/S
ECHO MV E/A RATIO: 0.9
ECHO MV E/E' LATERAL: 6.87
ECHO MV E/E' RATIO (AVERAGED): 7.74
ECHO MV E/E' SEPTAL: 8.61
ECHO PULMONARY ARTERY SYSTOLIC PRESSURE (PASP): 35 MMHG
ECHO RV TAPSE: 2.09 CM (ref 1.5–2)
ECHO TV REGURGITANT MAX VELOCITY: 286.36 CM/S
ECHO TV REGURGITANT PEAK GRADIENT: 32.8 MMHG
EOSINOPHIL # BLD: 0 K/UL (ref 0–0.4)
EOSINOPHIL # BLD: 0.1 K/UL (ref 0–0.4)
EOSINOPHIL # BLD: 0.2 K/UL (ref 0–0.4)
EOSINOPHIL NFR BLD: 0 % (ref 0–5)
EOSINOPHIL NFR BLD: 0 % (ref 0–5)
EOSINOPHIL NFR BLD: 1 % (ref 0–5)
EOSINOPHIL NFR BLD: 1 % (ref 0–5)
EOSINOPHIL NFR BLD: 2 % (ref 0–5)
EOSINOPHIL NFR BLD: 3 % (ref 0–5)
EOSINOPHIL NFR BLD: 4 % (ref 0–5)
EPITH CASTS URNS QL MICRO: ABNORMAL /LPF (ref 0–5)
EPITH CASTS URNS QL MICRO: ABNORMAL /LPF (ref 0–5)
ERYTHROCYTE [DISTWIDTH] IN BLOOD BY AUTOMATED COUNT: 14.5 % (ref 11.6–14.5)
ERYTHROCYTE [DISTWIDTH] IN BLOOD BY AUTOMATED COUNT: 16.6 % (ref 11.6–14.5)
ERYTHROCYTE [DISTWIDTH] IN BLOOD BY AUTOMATED COUNT: 16.6 % (ref 11.6–14.5)
ERYTHROCYTE [DISTWIDTH] IN BLOOD BY AUTOMATED COUNT: 16.8 % (ref 11.6–14.5)
ERYTHROCYTE [DISTWIDTH] IN BLOOD BY AUTOMATED COUNT: 16.8 % (ref 11.6–14.5)
ERYTHROCYTE [DISTWIDTH] IN BLOOD BY AUTOMATED COUNT: 16.9 % (ref 11.6–14.5)
ERYTHROCYTE [DISTWIDTH] IN BLOOD BY AUTOMATED COUNT: 16.9 % (ref 11.6–14.5)
ERYTHROCYTE [DISTWIDTH] IN BLOOD BY AUTOMATED COUNT: 17 % (ref 11.6–14.5)
ERYTHROCYTE [DISTWIDTH] IN BLOOD BY AUTOMATED COUNT: 17 % (ref 11.6–14.5)
ERYTHROCYTE [DISTWIDTH] IN BLOOD BY AUTOMATED COUNT: 17.1 % (ref 11.6–14.5)
ERYTHROCYTE [DISTWIDTH] IN BLOOD BY AUTOMATED COUNT: 17.2 % (ref 11.6–14.5)
ERYTHROCYTE [DISTWIDTH] IN BLOOD BY AUTOMATED COUNT: 17.3 % (ref 11.6–14.5)
ERYTHROCYTE [DISTWIDTH] IN BLOOD BY AUTOMATED COUNT: 17.3 % (ref 11.6–14.5)
ERYTHROCYTE [DISTWIDTH] IN BLOOD BY AUTOMATED COUNT: 19.3 % (ref 11.6–14.5)
ERYTHROCYTE [DISTWIDTH] IN BLOOD BY AUTOMATED COUNT: 20.1 % (ref 11.6–14.5)
ERYTHROCYTE [DISTWIDTH] IN BLOOD BY AUTOMATED COUNT: 20.4 % (ref 11.6–14.5)
ERYTHROCYTE [DISTWIDTH] IN BLOOD BY AUTOMATED COUNT: 20.5 % (ref 11.6–14.5)
FIBRINOGEN PPP-MCNC: 102 MG/DL (ref 210–451)
FIBRINOGEN PPP-MCNC: 105 MG/DL (ref 210–451)
FIBRINOGEN PPP-MCNC: 111 MG/DL (ref 210–451)
FIBRINOGEN PPP-MCNC: 126 MG/DL (ref 210–451)
FIBRINOGEN PPP-MCNC: 73 MG/DL (ref 210–451)
FIBRINOGEN PPP-MCNC: 78 MG/DL (ref 210–451)
FIBRINOGEN PPP-MCNC: 79 MG/DL (ref 210–451)
FIBRINOGEN PPP-MCNC: 81 MG/DL (ref 210–451)
FIBRINOGEN PPP-MCNC: 86 MG/DL (ref 210–451)
GAS FLOW.O2 O2 DELIVERY SYS: ABNORMAL L/MIN
GLOBULIN SER CALC-MCNC: 1.8 G/DL (ref 2–4)
GLOBULIN SER CALC-MCNC: 1.8 G/DL (ref 2–4)
GLOBULIN SER CALC-MCNC: 2 G/DL (ref 2–4)
GLOBULIN SER CALC-MCNC: 2.1 G/DL (ref 2–4)
GLOBULIN SER CALC-MCNC: 2.1 G/DL (ref 2–4)
GLOBULIN SER CALC-MCNC: 2.3 G/DL (ref 2–4)
GLOBULIN SER CALC-MCNC: 2.3 G/DL (ref 2–4)
GLOBULIN SER CALC-MCNC: 2.4 G/DL (ref 2–4)
GLOBULIN SER CALC-MCNC: 2.5 G/DL (ref 2–4)
GLOBULIN SER CALC-MCNC: 2.5 G/DL (ref 2–4)
GLOBULIN SER CALC-MCNC: 2.7 G/DL (ref 2–4)
GLOBULIN SER CALC-MCNC: 2.9 G/DL (ref 2–4)
GLOBULIN SER CALC-MCNC: 2.9 G/DL (ref 2–4)
GLOBULIN SER CALC-MCNC: 3 G/DL (ref 2–4)
GLOBULIN SER CALC-MCNC: 3.1 G/DL (ref 2–4)
GLOBULIN SER CALC-MCNC: 3.5 G/DL (ref 2–4)
GLOBULIN SER CALC-MCNC: 3.7 G/DL (ref 2–4)
GLUCOSE BLD STRIP.AUTO-MCNC: 101 MG/DL (ref 70–110)
GLUCOSE BLD STRIP.AUTO-MCNC: 103 MG/DL (ref 70–110)
GLUCOSE BLD STRIP.AUTO-MCNC: 105 MG/DL (ref 70–110)
GLUCOSE BLD STRIP.AUTO-MCNC: 106 MG/DL (ref 70–110)
GLUCOSE BLD STRIP.AUTO-MCNC: 108 MG/DL (ref 70–110)
GLUCOSE BLD STRIP.AUTO-MCNC: 110 MG/DL (ref 70–110)
GLUCOSE BLD STRIP.AUTO-MCNC: 116 MG/DL (ref 70–110)
GLUCOSE BLD STRIP.AUTO-MCNC: 118 MG/DL (ref 70–110)
GLUCOSE BLD STRIP.AUTO-MCNC: 119 MG/DL (ref 70–110)
GLUCOSE BLD STRIP.AUTO-MCNC: 120 MG/DL (ref 70–110)
GLUCOSE BLD STRIP.AUTO-MCNC: 121 MG/DL (ref 70–110)
GLUCOSE BLD STRIP.AUTO-MCNC: 122 MG/DL (ref 70–110)
GLUCOSE BLD STRIP.AUTO-MCNC: 122 MG/DL (ref 70–110)
GLUCOSE BLD STRIP.AUTO-MCNC: 123 MG/DL (ref 70–110)
GLUCOSE BLD STRIP.AUTO-MCNC: 126 MG/DL (ref 70–110)
GLUCOSE BLD STRIP.AUTO-MCNC: 128 MG/DL (ref 70–110)
GLUCOSE BLD STRIP.AUTO-MCNC: 131 MG/DL (ref 70–110)
GLUCOSE BLD STRIP.AUTO-MCNC: 131 MG/DL (ref 70–110)
GLUCOSE BLD STRIP.AUTO-MCNC: 134 MG/DL (ref 70–110)
GLUCOSE BLD STRIP.AUTO-MCNC: 137 MG/DL (ref 70–110)
GLUCOSE BLD STRIP.AUTO-MCNC: 142 MG/DL (ref 70–110)
GLUCOSE BLD STRIP.AUTO-MCNC: 143 MG/DL (ref 70–110)
GLUCOSE BLD STRIP.AUTO-MCNC: 147 MG/DL (ref 70–110)
GLUCOSE BLD STRIP.AUTO-MCNC: 151 MG/DL (ref 70–110)
GLUCOSE BLD STRIP.AUTO-MCNC: 151 MG/DL (ref 70–110)
GLUCOSE BLD STRIP.AUTO-MCNC: 153 MG/DL (ref 70–110)
GLUCOSE BLD STRIP.AUTO-MCNC: 154 MG/DL (ref 70–110)
GLUCOSE BLD STRIP.AUTO-MCNC: 158 MG/DL (ref 70–110)
GLUCOSE BLD STRIP.AUTO-MCNC: 160 MG/DL (ref 70–110)
GLUCOSE BLD STRIP.AUTO-MCNC: 161 MG/DL (ref 70–110)
GLUCOSE BLD STRIP.AUTO-MCNC: 163 MG/DL (ref 70–110)
GLUCOSE BLD STRIP.AUTO-MCNC: 164 MG/DL (ref 70–110)
GLUCOSE BLD STRIP.AUTO-MCNC: 165 MG/DL (ref 70–110)
GLUCOSE BLD STRIP.AUTO-MCNC: 167 MG/DL (ref 70–110)
GLUCOSE BLD STRIP.AUTO-MCNC: 171 MG/DL (ref 70–110)
GLUCOSE BLD STRIP.AUTO-MCNC: 171 MG/DL (ref 70–110)
GLUCOSE BLD STRIP.AUTO-MCNC: 173 MG/DL (ref 70–110)
GLUCOSE BLD STRIP.AUTO-MCNC: 173 MG/DL (ref 70–110)
GLUCOSE BLD STRIP.AUTO-MCNC: 174 MG/DL (ref 70–110)
GLUCOSE BLD STRIP.AUTO-MCNC: 175 MG/DL (ref 70–110)
GLUCOSE BLD STRIP.AUTO-MCNC: 175 MG/DL (ref 70–110)
GLUCOSE BLD STRIP.AUTO-MCNC: 181 MG/DL (ref 70–110)
GLUCOSE BLD STRIP.AUTO-MCNC: 183 MG/DL (ref 70–110)
GLUCOSE BLD STRIP.AUTO-MCNC: 183 MG/DL (ref 70–110)
GLUCOSE BLD STRIP.AUTO-MCNC: 196 MG/DL (ref 70–110)
GLUCOSE BLD STRIP.AUTO-MCNC: 199 MG/DL (ref 70–110)
GLUCOSE BLD STRIP.AUTO-MCNC: 200 MG/DL (ref 70–110)
GLUCOSE BLD STRIP.AUTO-MCNC: 201 MG/DL (ref 70–110)
GLUCOSE BLD STRIP.AUTO-MCNC: 201 MG/DL (ref 70–110)
GLUCOSE BLD STRIP.AUTO-MCNC: 206 MG/DL (ref 70–110)
GLUCOSE BLD STRIP.AUTO-MCNC: 267 MG/DL (ref 70–110)
GLUCOSE BLD STRIP.AUTO-MCNC: 37 MG/DL (ref 70–110)
GLUCOSE BLD STRIP.AUTO-MCNC: 64 MG/DL (ref 70–110)
GLUCOSE BLD STRIP.AUTO-MCNC: 67 MG/DL (ref 70–110)
GLUCOSE BLD STRIP.AUTO-MCNC: 71 MG/DL (ref 70–110)
GLUCOSE BLD STRIP.AUTO-MCNC: 76 MG/DL (ref 70–110)
GLUCOSE BLD STRIP.AUTO-MCNC: 77 MG/DL (ref 70–110)
GLUCOSE BLD STRIP.AUTO-MCNC: 91 MG/DL (ref 70–110)
GLUCOSE BLD STRIP.AUTO-MCNC: 96 MG/DL (ref 70–110)
GLUCOSE SERPL-MCNC: 117 MG/DL (ref 74–99)
GLUCOSE SERPL-MCNC: 123 MG/DL (ref 74–99)
GLUCOSE SERPL-MCNC: 142 MG/DL (ref 74–99)
GLUCOSE SERPL-MCNC: 143 MG/DL (ref 74–99)
GLUCOSE SERPL-MCNC: 147 MG/DL (ref 74–99)
GLUCOSE SERPL-MCNC: 148 MG/DL (ref 74–99)
GLUCOSE SERPL-MCNC: 151 MG/DL (ref 74–99)
GLUCOSE SERPL-MCNC: 153 MG/DL (ref 74–99)
GLUCOSE SERPL-MCNC: 157 MG/DL (ref 74–99)
GLUCOSE SERPL-MCNC: 160 MG/DL (ref 74–99)
GLUCOSE SERPL-MCNC: 163 MG/DL (ref 74–99)
GLUCOSE SERPL-MCNC: 165 MG/DL (ref 74–99)
GLUCOSE SERPL-MCNC: 189 MG/DL (ref 74–99)
GLUCOSE SERPL-MCNC: 43 MG/DL (ref 74–99)
GLUCOSE SERPL-MCNC: 91 MG/DL (ref 74–99)
GLUCOSE SERPL-MCNC: 92 MG/DL (ref 74–99)
GLUCOSE UR STRIP.AUTO-MCNC: 500 MG/DL
GLUCOSE UR STRIP.AUTO-MCNC: NEGATIVE MG/DL
GRAN CASTS URNS QL MICRO: ABNORMAL /LPF
HAPTOGLOB SERPL-MCNC: <31 MG/DL (ref 30–200)
HBA1C MFR BLD HPLC: 5.9 %
HCO3 BLD-SCNC: 21.7 MMOL/L (ref 22–26)
HCT VFR BLD AUTO: 19.3 % (ref 35–45)
HCT VFR BLD AUTO: 19.7 % (ref 35–45)
HCT VFR BLD AUTO: 20.3 % (ref 35–45)
HCT VFR BLD AUTO: 20.4 % (ref 35–45)
HCT VFR BLD AUTO: 20.4 % (ref 35–45)
HCT VFR BLD AUTO: 20.5 % (ref 35–45)
HCT VFR BLD AUTO: 20.7 % (ref 35–45)
HCT VFR BLD AUTO: 21.2 % (ref 35–45)
HCT VFR BLD AUTO: 21.3 % (ref 35–45)
HCT VFR BLD AUTO: 21.7 % (ref 35–45)
HCT VFR BLD AUTO: 22 % (ref 35–45)
HCT VFR BLD AUTO: 22 % (ref 35–45)
HCT VFR BLD AUTO: 22.5 % (ref 35–45)
HCT VFR BLD AUTO: 22.9 % (ref 35–45)
HCT VFR BLD AUTO: 24.5 % (ref 35–45)
HCT VFR BLD AUTO: 25.7 % (ref 35–45)
HCT VFR BLD AUTO: 34.7 % (ref 35–45)
HCT VFR BLD AUTO: 37.1 % (ref 35–45)
HDLC SERPL-MCNC: 48 MG/DL (ref 40–60)
HDLC SERPL: 2.1 {RATIO} (ref 0–5)
HGB BLD-MCNC: 10.7 G/DL (ref 12–16)
HGB BLD-MCNC: 13.3 G/DL (ref 12–16)
HGB BLD-MCNC: 6.8 G/DL (ref 12–16)
HGB BLD-MCNC: 6.9 G/DL (ref 12–16)
HGB BLD-MCNC: 7 G/DL (ref 12–16)
HGB BLD-MCNC: 7.1 G/DL (ref 12–16)
HGB BLD-MCNC: 7.1 G/DL (ref 12–16)
HGB BLD-MCNC: 7.2 G/DL (ref 12–16)
HGB BLD-MCNC: 7.2 G/DL (ref 12–16)
HGB BLD-MCNC: 7.4 G/DL (ref 12–16)
HGB BLD-MCNC: 7.5 G/DL (ref 12–16)
HGB BLD-MCNC: 7.6 G/DL (ref 12–16)
HGB BLD-MCNC: 7.7 G/DL (ref 12–16)
HGB BLD-MCNC: 7.8 G/DL (ref 12–16)
HGB BLD-MCNC: 7.8 G/DL (ref 12–16)
HGB BLD-MCNC: 8 G/DL (ref 12–16)
HGB BLD-MCNC: 8.4 G/DL (ref 12–16)
HGB BLD-MCNC: 8.6 G/DL (ref 12–16)
HGB UR QL STRIP: ABNORMAL
HGB UR QL STRIP: ABNORMAL
HYALINE CASTS URNS QL MICRO: ABNORMAL /LPF (ref 0–2)
INR PPP: 1.9 (ref 0.8–1.2)
INR PPP: 2 (ref 0.8–1.2)
INR PPP: 2.2 (ref 0.8–1.2)
INR PPP: 2.3 (ref 0.8–1.2)
INR PPP: 2.4 (ref 0.8–1.2)
INR PPP: 2.5 (ref 0.8–1.2)
INR PPP: 2.6 (ref 0.8–1.2)
INR PPP: 2.8 (ref 0.8–1.2)
INR PPP: 3 (ref 0.8–1.2)
INR PPP: 3.1 (ref 0.8–1.2)
KETONES UR QL STRIP.AUTO: ABNORMAL MG/DL
KETONES UR QL STRIP.AUTO: NEGATIVE MG/DL
LACTATE BLD-SCNC: 1.52 MMOL/L (ref 0.4–2)
LACTATE BLD-SCNC: 2.82 MMOL/L (ref 0.4–2)
LACTATE SERPL-SCNC: 2.9 MMOL/L (ref 0.4–2)
LACTATE SERPL-SCNC: 3.3 MMOL/L (ref 0.4–2)
LACTATE SERPL-SCNC: 3.8 MMOL/L (ref 0.4–2)
LACTATE SERPL-SCNC: 4 MMOL/L (ref 0.4–2)
LACTATE SERPL-SCNC: 4.4 MMOL/L (ref 0.4–2)
LACTATE SERPL-SCNC: 5 MMOL/L (ref 0.4–2)
LACTATE SERPL-SCNC: 5.2 MMOL/L (ref 0.4–2)
LACTATE SERPL-SCNC: 5.6 MMOL/L (ref 0.4–2)
LDH SERPL L TO P-CCNC: 203 U/L (ref 81–234)
LDLC SERPL CALC-MCNC: 42.4 MG/DL (ref 0–100)
LEUKOCYTE ESTERASE UR QL STRIP.AUTO: ABNORMAL
LEUKOCYTE ESTERASE UR QL STRIP.AUTO: NEGATIVE
LIPASE SERPL-CCNC: <10 U/L (ref 73–393)
LIPID PROFILE,FLP: NORMAL
LYMPHOCYTES # BLD: 0.3 K/UL (ref 0.9–3.6)
LYMPHOCYTES # BLD: 0.4 K/UL (ref 0.9–3.6)
LYMPHOCYTES # BLD: 0.4 K/UL (ref 0.9–3.6)
LYMPHOCYTES # BLD: 0.5 K/UL (ref 0.9–3.6)
LYMPHOCYTES # BLD: 0.6 K/UL (ref 0.8–3.5)
LYMPHOCYTES # BLD: 0.6 K/UL (ref 0.9–3.6)
LYMPHOCYTES # BLD: 0.6 K/UL (ref 0.9–3.6)
LYMPHOCYTES # BLD: 0.7 K/UL (ref 0.9–3.6)
LYMPHOCYTES # BLD: 0.7 K/UL (ref 0.9–3.6)
LYMPHOCYTES # BLD: 0.8 K/UL (ref 0.9–3.6)
LYMPHOCYTES # BLD: 0.8 K/UL (ref 0.9–3.6)
LYMPHOCYTES # BLD: 1.1 K/UL (ref 0.9–3.6)
LYMPHOCYTES NFR BLD: 10 % (ref 21–52)
LYMPHOCYTES NFR BLD: 11 % (ref 20–51)
LYMPHOCYTES NFR BLD: 12 % (ref 21–52)
LYMPHOCYTES NFR BLD: 15 % (ref 21–52)
LYMPHOCYTES NFR BLD: 16 % (ref 21–52)
LYMPHOCYTES NFR BLD: 18 % (ref 21–52)
LYMPHOCYTES NFR BLD: 19 % (ref 21–52)
LYMPHOCYTES NFR BLD: 20 % (ref 21–52)
LYMPHOCYTES NFR BLD: 22 % (ref 21–52)
LYMPHOCYTES NFR BLD: 23 % (ref 21–52)
LYMPHOCYTES NFR BLD: 24 % (ref 21–52)
LYMPHOCYTES NFR BLD: 8 % (ref 21–52)
Lab: NORMAL
MAGNESIUM SERPL-MCNC: 1.6 MG/DL (ref 1.6–2.6)
MAGNESIUM SERPL-MCNC: 1.7 MG/DL (ref 1.6–2.6)
MCH RBC QN AUTO: 30.9 PG (ref 24–34)
MCH RBC QN AUTO: 33.6 PG (ref 24–34)
MCH RBC QN AUTO: 34 PG (ref 24–34)
MCH RBC QN AUTO: 34.2 PG (ref 24–34)
MCH RBC QN AUTO: 34.3 PG (ref 24–34)
MCH RBC QN AUTO: 35 PG (ref 24–34)
MCH RBC QN AUTO: 35.2 PG (ref 24–34)
MCH RBC QN AUTO: 35.3 PG (ref 24–34)
MCH RBC QN AUTO: 35.5 PG (ref 24–34)
MCH RBC QN AUTO: 35.6 PG (ref 24–34)
MCH RBC QN AUTO: 35.6 PG (ref 24–34)
MCH RBC QN AUTO: 35.8 PG (ref 24–34)
MCH RBC QN AUTO: 35.8 PG (ref 24–34)
MCH RBC QN AUTO: 35.9 PG (ref 24–34)
MCH RBC QN AUTO: 36.2 PG (ref 24–34)
MCH RBC QN AUTO: 36.5 PG (ref 24–34)
MCH RBC QN AUTO: 37.2 PG (ref 24–34)
MCHC RBC AUTO-ENTMCNC: 30.8 G/DL (ref 31–37)
MCHC RBC AUTO-ENTMCNC: 33.5 G/DL (ref 31–37)
MCHC RBC AUTO-ENTMCNC: 34.1 G/DL (ref 31–37)
MCHC RBC AUTO-ENTMCNC: 34.3 G/DL (ref 31–37)
MCHC RBC AUTO-ENTMCNC: 34.3 G/DL (ref 31–37)
MCHC RBC AUTO-ENTMCNC: 34.7 G/DL (ref 31–37)
MCHC RBC AUTO-ENTMCNC: 34.8 G/DL (ref 31–37)
MCHC RBC AUTO-ENTMCNC: 34.9 G/DL (ref 31–37)
MCHC RBC AUTO-ENTMCNC: 34.9 G/DL (ref 31–37)
MCHC RBC AUTO-ENTMCNC: 35 G/DL (ref 31–37)
MCHC RBC AUTO-ENTMCNC: 35 G/DL (ref 31–37)
MCHC RBC AUTO-ENTMCNC: 35.2 G/DL (ref 31–37)
MCHC RBC AUTO-ENTMCNC: 35.2 G/DL (ref 31–37)
MCHC RBC AUTO-ENTMCNC: 35.3 G/DL (ref 31–37)
MCHC RBC AUTO-ENTMCNC: 35.5 G/DL (ref 31–37)
MCHC RBC AUTO-ENTMCNC: 35.5 G/DL (ref 31–37)
MCHC RBC AUTO-ENTMCNC: 35.8 G/DL (ref 31–37)
MCV RBC AUTO: 100.3 FL (ref 74–97)
MCV RBC AUTO: 101.5 FL (ref 74–97)
MCV RBC AUTO: 101.6 FL (ref 74–97)
MCV RBC AUTO: 102.3 FL (ref 74–97)
MCV RBC AUTO: 102.7 FL (ref 74–97)
MCV RBC AUTO: 102.9 FL (ref 74–97)
MCV RBC AUTO: 103 FL (ref 74–97)
MCV RBC AUTO: 103.5 FL (ref 74–97)
MCV RBC AUTO: 103.6 FL (ref 74–97)
MCV RBC AUTO: 103.6 FL (ref 74–97)
MCV RBC AUTO: 103.8 FL (ref 74–97)
MCV RBC AUTO: 104.3 FL (ref 74–97)
MCV RBC AUTO: 104.5 FL (ref 74–97)
MCV RBC AUTO: 95.5 FL (ref 74–97)
MCV RBC AUTO: 96.2 FL (ref 74–97)
MCV RBC AUTO: 96.5 FL (ref 74–97)
MCV RBC AUTO: 96.7 FL (ref 74–97)
METHYLMALONATE SERPL-SCNC: 111 NMOL/L (ref 0–378)
MICROALBUMIN UR TEST STR-MCNC: 30 MG/L
MICROALBUMIN/CREAT RATIO POC: <30 MG/G
MONOCYTES # BLD: 0.2 K/UL (ref 0.05–1.2)
MONOCYTES # BLD: 0.3 K/UL (ref 0.05–1.2)
MONOCYTES # BLD: 0.4 K/UL (ref 0.05–1.2)
MONOCYTES # BLD: 0.5 K/UL (ref 0.05–1.2)
MONOCYTES # BLD: 0.6 K/UL (ref 0.05–1.2)
MONOCYTES # BLD: 0.6 K/UL (ref 0.05–1.2)
MONOCYTES # BLD: 0.6 K/UL (ref 0–1)
MONOCYTES # BLD: 0.7 K/UL (ref 0.05–1.2)
MONOCYTES # BLD: 0.9 K/UL (ref 0.05–1.2)
MONOCYTES # BLD: 1 K/UL (ref 0.05–1.2)
MONOCYTES NFR BLD: 11 % (ref 2–9)
MONOCYTES NFR BLD: 11 % (ref 3–10)
MONOCYTES NFR BLD: 13 % (ref 3–10)
MONOCYTES NFR BLD: 14 % (ref 3–10)
MONOCYTES NFR BLD: 14 % (ref 3–10)
MONOCYTES NFR BLD: 15 % (ref 3–10)
MONOCYTES NFR BLD: 17 % (ref 3–10)
MONOCYTES NFR BLD: 18 % (ref 3–10)
MONOCYTES NFR BLD: 20 % (ref 3–10)
MONOCYTES NFR BLD: 24 % (ref 3–10)
MONOCYTES NFR BLD: 6 % (ref 3–10)
MONOCYTES NFR BLD: 9 % (ref 3–10)
MTHFR GENE MUT ANL BLD/T: NORMAL
MUCOUS THREADS URNS QL MICRO: ABNORMAL /LPF
NEUTS SEG # BLD: 1.7 K/UL (ref 1.8–8)
NEUTS SEG # BLD: 2.1 K/UL (ref 1.8–8)
NEUTS SEG # BLD: 2.3 K/UL (ref 1.8–8)
NEUTS SEG # BLD: 2.5 K/UL (ref 1.8–8)
NEUTS SEG # BLD: 2.8 K/UL (ref 1.8–8)
NEUTS SEG # BLD: 3.5 K/UL (ref 1.8–8)
NEUTS SEG # BLD: 3.9 K/UL (ref 1.8–8)
NEUTS SEG # BLD: 4 K/UL (ref 1.8–8)
NEUTS SEG # BLD: 4.2 K/UL (ref 1.8–8)
NEUTS SEG NFR BLD: 56 % (ref 40–73)
NEUTS SEG NFR BLD: 57 % (ref 40–73)
NEUTS SEG NFR BLD: 60 % (ref 40–73)
NEUTS SEG NFR BLD: 63 % (ref 40–73)
NEUTS SEG NFR BLD: 64 % (ref 40–73)
NEUTS SEG NFR BLD: 64 % (ref 40–73)
NEUTS SEG NFR BLD: 66 % (ref 40–73)
NEUTS SEG NFR BLD: 66 % (ref 40–73)
NEUTS SEG NFR BLD: 68 % (ref 40–73)
NEUTS SEG NFR BLD: 76 % (ref 40–73)
NEUTS SEG NFR BLD: 78 % (ref 42–75)
NEUTS SEG NFR BLD: 86 % (ref 40–73)
NITRITE UR QL STRIP.AUTO: NEGATIVE
NITRITE UR QL STRIP.AUTO: NEGATIVE
O2/TOTAL GAS SETTING VFR VENT: 0.21 %
OTHER,OTHU: ABNORMAL
P-R INTERVAL, ECG05: 126 MS
PCO2 BLD: 29.5 MMHG (ref 35–45)
PH BLD: 7.47 [PH] (ref 7.35–7.45)
PH UR STRIP: 5.5 [PH] (ref 5–8)
PH UR STRIP: 6 [PH] (ref 5–8)
PHOSPHATE SERPL-MCNC: 2 MG/DL (ref 2.5–4.9)
PLATELET # BLD AUTO: 103 K/UL (ref 135–420)
PLATELET # BLD AUTO: 104 K/UL (ref 135–420)
PLATELET # BLD AUTO: 110 K/UL (ref 135–420)
PLATELET # BLD AUTO: 113 K/UL (ref 135–420)
PLATELET # BLD AUTO: 118 K/UL (ref 135–420)
PLATELET # BLD AUTO: 122 K/UL (ref 135–420)
PLATELET # BLD AUTO: 124 K/UL (ref 135–420)
PLATELET # BLD AUTO: 131 K/UL (ref 135–420)
PLATELET # BLD AUTO: 137 K/UL (ref 135–420)
PLATELET # BLD AUTO: 145 K/UL (ref 135–420)
PLATELET # BLD AUTO: 146 K/UL (ref 135–420)
PLATELET # BLD AUTO: 148 K/UL (ref 135–420)
PLATELET # BLD AUTO: 156 K/UL (ref 135–420)
PLATELET # BLD AUTO: 158 K/UL (ref 135–420)
PLATELET # BLD AUTO: 174 K/UL (ref 135–420)
PLATELET # BLD AUTO: 187 K/UL (ref 135–420)
PLATELET # BLD AUTO: 384 K/UL (ref 135–420)
PLATELET COMMENTS,PCOM: ABNORMAL
PLATELET COMMENTS,PCOM: ABNORMAL
PMV BLD AUTO: 10.1 FL (ref 9.2–11.8)
PMV BLD AUTO: 10.2 FL (ref 9.2–11.8)
PMV BLD AUTO: 10.3 FL (ref 9.2–11.8)
PMV BLD AUTO: 10.3 FL (ref 9.2–11.8)
PMV BLD AUTO: 10.4 FL (ref 9.2–11.8)
PMV BLD AUTO: 10.5 FL (ref 9.2–11.8)
PMV BLD AUTO: 10.6 FL (ref 9.2–11.8)
PMV BLD AUTO: 10.8 FL (ref 9.2–11.8)
PMV BLD AUTO: 11 FL (ref 9.2–11.8)
PMV BLD AUTO: 11.2 FL (ref 9.2–11.8)
PMV BLD AUTO: 11.2 FL (ref 9.2–11.8)
PMV BLD AUTO: 9.6 FL (ref 9.2–11.8)
PMV BLD AUTO: 9.6 FL (ref 9.2–11.8)
PMV BLD AUTO: 9.7 FL (ref 9.2–11.8)
PMV BLD AUTO: 9.8 FL (ref 9.2–11.8)
PO2 BLD: 95 MMHG (ref 80–100)
POTASSIUM SERPL-SCNC: 2.7 MMOL/L (ref 3.5–5.5)
POTASSIUM SERPL-SCNC: 2.8 MMOL/L (ref 3.5–5.5)
POTASSIUM SERPL-SCNC: 3 MMOL/L (ref 3.5–5.5)
POTASSIUM SERPL-SCNC: 3.1 MMOL/L (ref 3.5–5.5)
POTASSIUM SERPL-SCNC: 3.1 MMOL/L (ref 3.5–5.5)
POTASSIUM SERPL-SCNC: 3.2 MMOL/L (ref 3.5–5.5)
POTASSIUM SERPL-SCNC: 3.3 MMOL/L (ref 3.5–5.5)
POTASSIUM SERPL-SCNC: 3.4 MMOL/L (ref 3.5–5.5)
POTASSIUM SERPL-SCNC: 3.6 MMOL/L (ref 3.5–5.5)
POTASSIUM SERPL-SCNC: 3.7 MMOL/L (ref 3.5–5.5)
PROT SERPL-MCNC: 4.5 G/DL (ref 6.4–8.2)
PROT SERPL-MCNC: 4.6 G/DL (ref 6.4–8.2)
PROT SERPL-MCNC: 4.6 G/DL (ref 6.4–8.2)
PROT SERPL-MCNC: 4.7 G/DL (ref 6.4–8.2)
PROT SERPL-MCNC: 4.7 G/DL (ref 6.4–8.2)
PROT SERPL-MCNC: 4.8 G/DL (ref 6.4–8.2)
PROT SERPL-MCNC: 4.8 G/DL (ref 6.4–8.2)
PROT SERPL-MCNC: 4.9 G/DL (ref 6.4–8.2)
PROT SERPL-MCNC: 5 G/DL (ref 6.4–8.2)
PROT SERPL-MCNC: 5 G/DL (ref 6.4–8.2)
PROT SERPL-MCNC: 5.1 G/DL (ref 6.4–8.2)
PROT SERPL-MCNC: 5.1 G/DL (ref 6.4–8.2)
PROT SERPL-MCNC: 5.2 G/DL (ref 6.4–8.2)
PROT SERPL-MCNC: 5.3 G/DL (ref 6.4–8.2)
PROT SERPL-MCNC: 6.7 G/DL (ref 6.4–8.2)
PROT UR STRIP-MCNC: 100 MG/DL
PROT UR STRIP-MCNC: 30 MG/DL
PROTHROMBIN TIME: 21.4 SEC (ref 11.5–15.2)
PROTHROMBIN TIME: 23 SEC (ref 11.5–15.2)
PROTHROMBIN TIME: 24.5 SEC (ref 11.5–15.2)
PROTHROMBIN TIME: 25.1 SEC (ref 11.5–15.2)
PROTHROMBIN TIME: 25.9 SEC (ref 11.5–15.2)
PROTHROMBIN TIME: 26.9 SEC (ref 11.5–15.2)
PROTHROMBIN TIME: 27.6 SEC (ref 11.5–15.2)
PROTHROMBIN TIME: 29.6 SEC (ref 11.5–15.2)
PROTHROMBIN TIME: 29.9 SEC (ref 11.5–15.2)
PROTHROMBIN TIME: 30 SEC (ref 11.5–15.2)
PROTHROMBIN TIME: 31.5 SEC (ref 11.5–15.2)
PROTHROMBIN TIME: 32.1 SEC (ref 11.5–15.2)
Q-T INTERVAL, ECG07: 334 MS
QRS DURATION, ECG06: 68 MS
QTC CALCULATION (BEZET), ECG08: 424 MS
RBC # BLD AUTO: 1.9 M/UL (ref 4.2–5.3)
RBC # BLD AUTO: 1.99 M/UL (ref 4.2–5.3)
RBC # BLD AUTO: 2 M/UL (ref 4.2–5.3)
RBC # BLD AUTO: 2.01 M/UL (ref 4.2–5.3)
RBC # BLD AUTO: 2.06 M/UL (ref 4.2–5.3)
RBC # BLD AUTO: 2.08 M/UL (ref 4.2–5.3)
RBC # BLD AUTO: 2.1 M/UL (ref 4.2–5.3)
RBC # BLD AUTO: 2.12 M/UL (ref 4.2–5.3)
RBC # BLD AUTO: 2.15 M/UL (ref 4.2–5.3)
RBC # BLD AUTO: 2.19 M/UL (ref 4.2–5.3)
RBC # BLD AUTO: 2.21 M/UL (ref 4.2–5.3)
RBC # BLD AUTO: 2.23 M/UL (ref 4.2–5.3)
RBC # BLD AUTO: 2.28 M/UL (ref 4.2–5.3)
RBC # BLD AUTO: 2.36 M/UL (ref 4.2–5.3)
RBC # BLD AUTO: 2.46 M/UL (ref 4.2–5.3)
RBC # BLD AUTO: 3.46 M/UL (ref 4.2–5.3)
RBC # BLD AUTO: 3.58 M/UL (ref 4.2–5.3)
RBC #/AREA URNS HPF: ABNORMAL /HPF (ref 0–5)
RBC #/AREA URNS HPF: ABNORMAL /HPF (ref 0–5)
RBC MORPH BLD: ABNORMAL
SAO2 % BLD: 98 % (ref 92–97)
SERVICE CMNT-IMP: ABNORMAL
SERVICE CMNT-IMP: NORMAL
SODIUM SERPL-SCNC: 137 MMOL/L (ref 136–145)
SODIUM SERPL-SCNC: 138 MMOL/L (ref 136–145)
SODIUM SERPL-SCNC: 140 MMOL/L (ref 136–145)
SODIUM SERPL-SCNC: 141 MMOL/L (ref 136–145)
SODIUM SERPL-SCNC: 142 MMOL/L (ref 136–145)
SODIUM SERPL-SCNC: 143 MMOL/L (ref 136–145)
SODIUM SERPL-SCNC: 147 MMOL/L (ref 136–145)
SODIUM SERPL-SCNC: 147 MMOL/L (ref 136–145)
SP GR UR REFRACTOMETRY: 1.02 (ref 1–1.03)
SP GR UR REFRACTOMETRY: 1.03 (ref 1–1.03)
SPECIMEN EXP DATE BLD: NORMAL
SPECIMEN EXP DATE BLD: NORMAL
SPECIMEN TYPE: ABNORMAL
STATUS OF UNIT,%ST: NORMAL
TOTAL RESP. RATE, ITRR: 20
TRIGL SERPL-MCNC: 58 MG/DL (ref ?–150)
TROPONIN I SERPL-MCNC: <0.02 NG/ML (ref 0–0.04)
TSH SERPL DL<=0.05 MIU/L-ACNC: 1.85 UIU/ML (ref 0.36–3.74)
UNIT DIVISION, %UDIV: 0
UROBILINOGEN UR QL STRIP.AUTO: 1 EU/DL (ref 0.2–1)
UROBILINOGEN UR QL STRIP.AUTO: 1 EU/DL (ref 0.2–1)
VENTRICULAR RATE, ECG03: 97 BPM
VLDLC SERPL CALC-MCNC: 11.6 MG/DL
WBC # BLD AUTO: 2.5 K/UL (ref 4.6–13.2)
WBC # BLD AUTO: 2.7 K/UL (ref 4.6–13.2)
WBC # BLD AUTO: 3 K/UL (ref 4.6–13.2)
WBC # BLD AUTO: 3 K/UL (ref 4.6–13.2)
WBC # BLD AUTO: 3.3 K/UL (ref 4.6–13.2)
WBC # BLD AUTO: 3.3 K/UL (ref 4.6–13.2)
WBC # BLD AUTO: 3.5 K/UL (ref 4.6–13.2)
WBC # BLD AUTO: 3.7 K/UL (ref 4.6–13.2)
WBC # BLD AUTO: 3.8 K/UL (ref 4.6–13.2)
WBC # BLD AUTO: 3.9 K/UL (ref 4.6–13.2)
WBC # BLD AUTO: 4.3 K/UL (ref 4.6–13.2)
WBC # BLD AUTO: 4.5 K/UL (ref 4.6–13.2)
WBC # BLD AUTO: 4.6 K/UL (ref 4.6–13.2)
WBC # BLD AUTO: 5.2 K/UL (ref 4.6–13.2)
WBC # BLD AUTO: 5.4 K/UL (ref 4.6–13.2)
WBC # BLD AUTO: 6.1 K/UL (ref 4.6–13.2)
WBC # BLD AUTO: 6.7 K/UL (ref 4.6–13.2)
WBC URNS QL MICRO: ABNORMAL /HPF (ref 0–4)
WBC URNS QL MICRO: ABNORMAL /HPF (ref 0–5)
YEAST URNS QL MICRO: ABNORMAL

## 2019-01-01 PROCEDURE — P9047 ALBUMIN (HUMAN), 25%, 50ML: HCPCS | Performed by: INTERNAL MEDICINE

## 2019-01-01 PROCEDURE — 94762 N-INVAS EAR/PLS OXIMTRY CONT: CPT

## 2019-01-01 PROCEDURE — 74011000258 HC RX REV CODE- 258: Performed by: HOSPITALIST

## 2019-01-01 PROCEDURE — 74011000250 HC RX REV CODE- 250: Performed by: HOSPITALIST

## 2019-01-01 PROCEDURE — 93970 EXTREMITY STUDY: CPT

## 2019-01-01 PROCEDURE — 77030038269 HC DRN EXT URIN PURWCK BARD -A

## 2019-01-01 PROCEDURE — T4541 LARGE DISPOSABLE UNDERPAD: HCPCS

## 2019-01-01 PROCEDURE — 85730 THROMBOPLASTIN TIME PARTIAL: CPT

## 2019-01-01 PROCEDURE — 82962 GLUCOSE BLOOD TEST: CPT

## 2019-01-01 PROCEDURE — 76450000000

## 2019-01-01 PROCEDURE — 74011250636 HC RX REV CODE- 250/636: Performed by: INTERNAL MEDICINE

## 2019-01-01 PROCEDURE — 65660000000 HC RM CCU STEPDOWN

## 2019-01-01 PROCEDURE — 74011636637 HC RX REV CODE- 636/637: Performed by: HOSPITALIST

## 2019-01-01 PROCEDURE — A6250 SKIN SEAL PROTECT MOISTURIZR: HCPCS

## 2019-01-01 PROCEDURE — C9113 INJ PANTOPRAZOLE SODIUM, VIA: HCPCS | Performed by: HOSPITALIST

## 2019-01-01 PROCEDURE — 74011636320 HC RX REV CODE- 636/320: Performed by: HOSPITALIST

## 2019-01-01 PROCEDURE — 85610 PROTHROMBIN TIME: CPT

## 2019-01-01 PROCEDURE — 80053 COMPREHEN METABOLIC PANEL: CPT

## 2019-01-01 PROCEDURE — 93005 ELECTROCARDIOGRAM TRACING: CPT

## 2019-01-01 PROCEDURE — 74011250636 HC RX REV CODE- 250/636: Performed by: HOSPITALIST

## 2019-01-01 PROCEDURE — 85027 COMPLETE CBC AUTOMATED: CPT

## 2019-01-01 PROCEDURE — 77010033678 HC OXYGEN DAILY

## 2019-01-01 PROCEDURE — 36591 DRAW BLOOD OFF VENOUS DEVICE: CPT

## 2019-01-01 PROCEDURE — 74011000258 HC RX REV CODE- 258: Performed by: INTERNAL MEDICINE

## 2019-01-01 PROCEDURE — 83735 ASSAY OF MAGNESIUM: CPT

## 2019-01-01 PROCEDURE — 85025 COMPLETE CBC W/AUTO DIFF WBC: CPT

## 2019-01-01 PROCEDURE — 84484 ASSAY OF TROPONIN QUANT: CPT

## 2019-01-01 PROCEDURE — 93306 TTE W/DOPPLER COMPLETE: CPT

## 2019-01-01 PROCEDURE — 77030011943

## 2019-01-01 PROCEDURE — 81001 URINALYSIS AUTO W/SCOPE: CPT

## 2019-01-01 PROCEDURE — 82140 ASSAY OF AMMONIA: CPT

## 2019-01-01 PROCEDURE — 92610 EVALUATE SWALLOWING FUNCTION: CPT

## 2019-01-01 PROCEDURE — 85384 FIBRINOGEN ACTIVITY: CPT

## 2019-01-01 PROCEDURE — 74011250637 HC RX REV CODE- 250/637: Performed by: INTERNAL MEDICINE

## 2019-01-01 PROCEDURE — 77030005538 HC CATH URETH FOL44 BARD -B

## 2019-01-01 PROCEDURE — 85018 HEMOGLOBIN: CPT

## 2019-01-01 PROCEDURE — 74011250637 HC RX REV CODE- 250/637: Performed by: HOSPITALIST

## 2019-01-01 PROCEDURE — 82803 BLOOD GASES ANY COMBINATION: CPT

## 2019-01-01 PROCEDURE — 0651 HSPC ROUTINE HOME CARE

## 2019-01-01 PROCEDURE — G0299 HHS/HOSPICE OF RN EA 15 MIN: HCPCS

## 2019-01-01 PROCEDURE — 74011250637 HC RX REV CODE- 250/637: Performed by: FAMILY MEDICINE

## 2019-01-01 PROCEDURE — P9045 ALBUMIN (HUMAN), 5%, 250 ML: HCPCS | Performed by: HOSPITALIST

## 2019-01-01 PROCEDURE — 94400 HC END TIDAL CO2 RESPONSE CURVE: CPT

## 2019-01-01 PROCEDURE — 80076 HEPATIC FUNCTION PANEL: CPT

## 2019-01-01 PROCEDURE — 84132 ASSAY OF SERUM POTASSIUM: CPT

## 2019-01-01 PROCEDURE — 77030018846 HC SOL IRR STRL H20 ICUM -A

## 2019-01-01 PROCEDURE — 83880 ASSAY OF NATRIURETIC PEPTIDE: CPT

## 2019-01-01 PROCEDURE — 77030008771 HC TU NG SALEM SUMP -A

## 2019-01-01 PROCEDURE — 74011000250 HC RX REV CODE- 250

## 2019-01-01 PROCEDURE — A9270 NON-COVERED ITEM OR SERVICE: HCPCS

## 2019-01-01 PROCEDURE — 74018 RADEX ABDOMEN 1 VIEW: CPT

## 2019-01-01 PROCEDURE — 71045 X-RAY EXAM CHEST 1 VIEW: CPT

## 2019-01-01 PROCEDURE — 96365 THER/PROPH/DIAG IV INF INIT: CPT

## 2019-01-01 PROCEDURE — 88307 TISSUE EXAM BY PATHOLOGIST: CPT

## 2019-01-01 PROCEDURE — 74011250636 HC RX REV CODE- 250/636

## 2019-01-01 PROCEDURE — 36415 COLL VENOUS BLD VENIPUNCTURE: CPT

## 2019-01-01 PROCEDURE — 87040 BLOOD CULTURE FOR BACTERIA: CPT

## 2019-01-01 PROCEDURE — A4927 NON-STERILE GLOVES: HCPCS

## 2019-01-01 PROCEDURE — 77030037878 HC DRSG MEPILEX >48IN BORD MOLN -B

## 2019-01-01 PROCEDURE — 96375 TX/PRO/DX INJ NEW DRUG ADDON: CPT

## 2019-01-01 PROCEDURE — 74170 CT ABD WO CNTRST FLWD CNTRST: CPT

## 2019-01-01 PROCEDURE — 74011250636 HC RX REV CODE- 250/636: Performed by: FAMILY MEDICINE

## 2019-01-01 PROCEDURE — 77030011256 HC DRSG MEPILEX <16IN NO BORD MOLN -A

## 2019-01-01 PROCEDURE — 83921 ORGANIC ACID SINGLE QUANT: CPT

## 2019-01-01 PROCEDURE — 80048 BASIC METABOLIC PNL TOTAL CA: CPT

## 2019-01-01 PROCEDURE — 77030020291 HC FLEXSEAL FMS BMS -C

## 2019-01-01 PROCEDURE — 51798 US URINE CAPACITY MEASURE: CPT

## 2019-01-01 PROCEDURE — 86900 BLOOD TYPING SEROLOGIC ABO: CPT

## 2019-01-01 PROCEDURE — 36600 WITHDRAWAL OF ARTERIAL BLOOD: CPT

## 2019-01-01 PROCEDURE — 84100 ASSAY OF PHOSPHORUS: CPT

## 2019-01-01 PROCEDURE — 80061 LIPID PANEL: CPT

## 2019-01-01 PROCEDURE — 96366 THER/PROPH/DIAG IV INF ADDON: CPT

## 2019-01-01 PROCEDURE — 83605 ASSAY OF LACTIC ACID: CPT

## 2019-01-01 PROCEDURE — 76705 ECHO EXAM OF ABDOMEN: CPT

## 2019-01-01 PROCEDURE — 74181 MRI ABDOMEN W/O CONTRAST: CPT

## 2019-01-01 PROCEDURE — 36430 TRANSFUSION BLD/BLD COMPNT: CPT

## 2019-01-01 PROCEDURE — 74011250637 HC RX REV CODE- 250/637: Performed by: EMERGENCY MEDICINE

## 2019-01-01 PROCEDURE — 83690 ASSAY OF LIPASE: CPT

## 2019-01-01 PROCEDURE — 30233N1 TRANSFUSION OF NONAUTOLOGOUS RED BLOOD CELLS INTO PERIPHERAL VEIN, PERCUTANEOUS APPROACH: ICD-10-PCS | Performed by: HOSPITALIST

## 2019-01-01 PROCEDURE — 83615 LACTATE (LD) (LDH) ENZYME: CPT

## 2019-01-01 PROCEDURE — 77030037870 HC GLD SHT PREVALON SAGE -B

## 2019-01-01 PROCEDURE — 71275 CT ANGIOGRAPHY CHEST: CPT

## 2019-01-01 PROCEDURE — 88313 SPECIAL STAINS GROUP 2: CPT

## 2019-01-01 PROCEDURE — 74011000272 HC RX REV CODE- 272

## 2019-01-01 PROCEDURE — 3336500001 HSPC ELECTION

## 2019-01-01 PROCEDURE — 74011000250 HC RX REV CODE- 250: Performed by: EMERGENCY MEDICINE

## 2019-01-01 PROCEDURE — 83010 ASSAY OF HAPTOGLOBIN QUANT: CPT

## 2019-01-01 PROCEDURE — P9016 RBC LEUKOCYTES REDUCED: HCPCS

## 2019-01-01 PROCEDURE — 97116 GAIT TRAINING THERAPY: CPT

## 2019-01-01 PROCEDURE — 99285 EMERGENCY DEPT VISIT HI MDM: CPT

## 2019-01-01 PROCEDURE — 74011250636 HC RX REV CODE- 250/636: Performed by: EMERGENCY MEDICINE

## 2019-01-01 PROCEDURE — 77030037875 HC DRSG MEPILEX <16IN BORD MOLN -A

## 2019-01-01 PROCEDURE — 86923 COMPATIBILITY TEST ELECTRIC: CPT

## 2019-01-01 PROCEDURE — 97164 PT RE-EVAL EST PLAN CARE: CPT

## 2019-01-01 PROCEDURE — T4522 ADULT SIZE BRIEF/DIAPER MED: HCPCS

## 2019-01-01 PROCEDURE — 74011000272 HC RX REV CODE- 272: Performed by: INTERNAL MEDICINE

## 2019-01-01 PROCEDURE — 77030003503 CT BX LIVER NDL PERC

## 2019-01-01 PROCEDURE — 97530 THERAPEUTIC ACTIVITIES: CPT

## 2019-01-01 PROCEDURE — 0FB13ZX EXCISION OF RIGHT LOBE LIVER, PERCUTANEOUS APPROACH, DIAGNOSTIC: ICD-10-PCS | Performed by: RADIOLOGY

## 2019-01-01 PROCEDURE — 84443 ASSAY THYROID STIM HORMONE: CPT

## 2019-01-01 PROCEDURE — HHS10554 SHAMPOO/BODY WASH 8 OZ ALOE VESTA

## 2019-01-01 PROCEDURE — 97162 PT EVAL MOD COMPLEX 30 MIN: CPT

## 2019-01-01 PROCEDURE — 81291 MTHFR GENE: CPT

## 2019-01-01 RX ORDER — LOPERAMIDE HCL 2 MG
2 TABLET ORAL
Qty: 50 TAB | Refills: 0 | Status: SHIPPED | OUTPATIENT
Start: 2019-01-01 | End: 2019-01-01

## 2019-01-01 RX ORDER — SODIUM CHLORIDE 0.9 % (FLUSH) 0.9 %
5-10 SYRINGE (ML) INJECTION AS NEEDED
Status: DISCONTINUED | OUTPATIENT
Start: 2019-01-01 | End: 2019-01-01

## 2019-01-01 RX ORDER — POTASSIUM CHLORIDE 7.45 MG/ML
10 INJECTION INTRAVENOUS
Status: COMPLETED | OUTPATIENT
Start: 2019-01-01 | End: 2019-01-01

## 2019-01-01 RX ORDER — OMEPRAZOLE 40 MG/1
40 CAPSULE, DELAYED RELEASE ORAL DAILY
COMMUNITY
End: 2019-01-01

## 2019-01-01 RX ORDER — ERGOCALCIFEROL 1.25 MG/1
CAPSULE ORAL
Qty: 4 CAP | Refills: 2 | Status: SHIPPED | OUTPATIENT
Start: 2019-01-01 | End: 2019-01-01

## 2019-01-01 RX ORDER — NALOXONE HYDROCHLORIDE 0.4 MG/ML
0.1 INJECTION, SOLUTION INTRAMUSCULAR; INTRAVENOUS; SUBCUTANEOUS
Status: DISCONTINUED | OUTPATIENT
Start: 2019-01-01 | End: 2019-01-01 | Stop reason: HOSPADM

## 2019-01-01 RX ORDER — SODIUM CHLORIDE 9 MG/ML
1000 INJECTION, SOLUTION INTRAVENOUS
Status: COMPLETED | OUTPATIENT
Start: 2019-01-01 | End: 2019-01-01

## 2019-01-01 RX ORDER — POTASSIUM CHLORIDE 14.9 MG/ML
20 INJECTION INTRAVENOUS
Status: COMPLETED | OUTPATIENT
Start: 2019-01-01 | End: 2019-01-01

## 2019-01-01 RX ORDER — HYDROCODONE BITARTRATE AND ACETAMINOPHEN 5; 325 MG/1; MG/1
TABLET ORAL
COMMUNITY
End: 2019-01-01

## 2019-01-01 RX ORDER — ACETAMINOPHEN 500 MG
500 TABLET ORAL AS NEEDED
Status: DISCONTINUED | OUTPATIENT
Start: 2019-01-01 | End: 2019-01-01 | Stop reason: HOSPADM

## 2019-01-01 RX ORDER — BUSPIRONE HYDROCHLORIDE 7.5 MG/1
TABLET ORAL
Qty: 90 TAB | Refills: 0 | Status: SHIPPED | OUTPATIENT
Start: 2019-01-01 | End: 2019-01-01 | Stop reason: SDUPTHER

## 2019-01-01 RX ORDER — VANCOMYCIN HYDROCHLORIDE
1250 ONCE
Status: COMPLETED | OUTPATIENT
Start: 2019-01-01 | End: 2019-01-01

## 2019-01-01 RX ORDER — SODIUM CHLORIDE 9 MG/ML
250 INJECTION, SOLUTION INTRAVENOUS AS NEEDED
Status: DISCONTINUED | OUTPATIENT
Start: 2019-01-01 | End: 2019-01-01

## 2019-01-01 RX ORDER — MAGNESIUM SULFATE 100 %
4 CRYSTALS MISCELLANEOUS AS NEEDED
Status: DISCONTINUED | OUTPATIENT
Start: 2019-01-01 | End: 2019-01-01 | Stop reason: HOSPADM

## 2019-01-01 RX ORDER — PHYTONADIONE 10 MG/ML
INJECTION, EMULSION INTRAMUSCULAR; INTRAVENOUS; SUBCUTANEOUS
Status: DISCONTINUED
Start: 2019-01-01 | End: 2019-01-01 | Stop reason: WASHOUT

## 2019-01-01 RX ORDER — POTASSIUM CHLORIDE 20 MEQ/1
20 TABLET, EXTENDED RELEASE ORAL 2 TIMES DAILY
Status: COMPLETED | OUTPATIENT
Start: 2019-01-01 | End: 2019-01-01

## 2019-01-01 RX ORDER — LANOLIN ALCOHOL/MO/W.PET/CERES
400 CREAM (GRAM) TOPICAL
Status: COMPLETED | OUTPATIENT
Start: 2019-01-01 | End: 2019-01-01

## 2019-01-01 RX ORDER — POTASSIUM CHLORIDE 750 MG/1
TABLET, FILM COATED, EXTENDED RELEASE ORAL
COMMUNITY
End: 2019-01-01

## 2019-01-01 RX ORDER — SODIUM CHLORIDE 0.9 % (FLUSH) 0.9 %
5-40 SYRINGE (ML) INJECTION AS NEEDED
Status: DISCONTINUED | OUTPATIENT
Start: 2019-01-01 | End: 2019-01-01 | Stop reason: HOSPADM

## 2019-01-01 RX ORDER — FENTANYL CITRATE 50 UG/ML
50 INJECTION, SOLUTION INTRAMUSCULAR; INTRAVENOUS
Status: DISCONTINUED | OUTPATIENT
Start: 2019-01-01 | End: 2019-01-01

## 2019-01-01 RX ORDER — BUSPIRONE HYDROCHLORIDE 10 MG/1
10 TABLET ORAL 3 TIMES DAILY
Qty: 90 TAB | Refills: 1 | Status: SHIPPED | OUTPATIENT
Start: 2019-01-01 | End: 2019-01-01

## 2019-01-01 RX ORDER — DEXTROSE 50 % IN WATER (D50W) INTRAVENOUS SYRINGE
25-50 AS NEEDED
Status: DISCONTINUED | OUTPATIENT
Start: 2019-01-01 | End: 2019-01-01 | Stop reason: SDUPTHER

## 2019-01-01 RX ORDER — INSULIN GLARGINE 100 [IU]/ML
INJECTION, SOLUTION SUBCUTANEOUS
Qty: 15 PEN | Refills: 3 | Status: SHIPPED | OUTPATIENT
Start: 2019-01-01 | End: 2019-01-01

## 2019-01-01 RX ORDER — FLUMAZENIL 0.1 MG/ML
0.2 INJECTION INTRAVENOUS
Status: DISCONTINUED | OUTPATIENT
Start: 2019-01-01 | End: 2019-01-01 | Stop reason: HOSPADM

## 2019-01-01 RX ORDER — ALBUMIN HUMAN 250 G/1000ML
25 SOLUTION INTRAVENOUS EVERY 6 HOURS
Status: COMPLETED | OUTPATIENT
Start: 2019-01-01 | End: 2019-01-01

## 2019-01-01 RX ORDER — SODIUM CHLORIDE 9 MG/ML
500 INJECTION, SOLUTION INTRAVENOUS ONCE
Status: COMPLETED | OUTPATIENT
Start: 2019-01-01 | End: 2019-01-01

## 2019-01-01 RX ORDER — EMPAGLIFLOZIN 10 MG/1
10 TABLET, FILM COATED ORAL DAILY
Qty: 30 TAB | Refills: 3 | Status: SHIPPED | OUTPATIENT
Start: 2019-01-01 | End: 2019-01-01

## 2019-01-01 RX ORDER — FENTANYL CITRATE 50 UG/ML
INJECTION, SOLUTION INTRAMUSCULAR; INTRAVENOUS
Status: COMPLETED
Start: 2019-01-01 | End: 2019-01-01

## 2019-01-01 RX ORDER — INSULIN LISPRO 100 [IU]/ML
INJECTION, SOLUTION INTRAVENOUS; SUBCUTANEOUS
Status: DISCONTINUED | OUTPATIENT
Start: 2019-01-01 | End: 2019-01-01 | Stop reason: HOSPADM

## 2019-01-01 RX ORDER — ALBUMIN HUMAN 50 G/1000ML
25 SOLUTION INTRAVENOUS
Status: DISCONTINUED | OUTPATIENT
Start: 2019-01-01 | End: 2019-01-01

## 2019-01-01 RX ORDER — BUSPIRONE HYDROCHLORIDE 7.5 MG/1
TABLET ORAL
Qty: 270 TAB | Refills: 0 | Status: SHIPPED | OUTPATIENT
Start: 2019-01-01 | End: 2019-01-01

## 2019-01-01 RX ORDER — POTASSIUM CHLORIDE 1.5 G/1.77G
40 POWDER, FOR SOLUTION ORAL
Status: COMPLETED | OUTPATIENT
Start: 2019-01-01 | End: 2019-01-01

## 2019-01-01 RX ORDER — MIDAZOLAM HYDROCHLORIDE 1 MG/ML
INJECTION, SOLUTION INTRAMUSCULAR; INTRAVENOUS
Status: COMPLETED
Start: 2019-01-01 | End: 2019-01-01

## 2019-01-01 RX ORDER — LOPERAMIDE HYDROCHLORIDE 2 MG/1
CAPSULE ORAL
COMMUNITY
End: 2019-01-01 | Stop reason: ALTCHOICE

## 2019-01-01 RX ORDER — ALBUMIN HUMAN 50 G/1000ML
25 SOLUTION INTRAVENOUS EVERY 6 HOURS
Status: COMPLETED | OUTPATIENT
Start: 2019-01-01 | End: 2019-01-01

## 2019-01-01 RX ORDER — MIDAZOLAM HYDROCHLORIDE 1 MG/ML
1 INJECTION, SOLUTION INTRAMUSCULAR; INTRAVENOUS
Status: DISCONTINUED | OUTPATIENT
Start: 2019-01-01 | End: 2019-01-01

## 2019-01-01 RX ORDER — HYDROCORTISONE SODIUM SUCCINATE 100 MG/2ML
100 INJECTION, POWDER, FOR SOLUTION INTRAMUSCULAR; INTRAVENOUS ONCE
Status: COMPLETED | OUTPATIENT
Start: 2019-01-01 | End: 2019-01-01

## 2019-01-01 RX ORDER — MELATONIN
5000 DAILY
COMMUNITY
End: 2019-01-01

## 2019-01-01 RX ORDER — SODIUM CHLORIDE 0.9 % (FLUSH) 0.9 %
5-40 SYRINGE (ML) INJECTION EVERY 8 HOURS
Status: DISCONTINUED | OUTPATIENT
Start: 2019-01-01 | End: 2019-01-01 | Stop reason: HOSPADM

## 2019-01-01 RX ORDER — DEXTROSE 50 % IN WATER (D50W) INTRAVENOUS SYRINGE
12.5-25 AS NEEDED
Status: DISCONTINUED | OUTPATIENT
Start: 2019-01-01 | End: 2019-01-01 | Stop reason: HOSPADM

## 2019-01-01 RX ORDER — EMPAGLIFLOZIN 10 MG/1
10 TABLET, FILM COATED ORAL DAILY
Qty: 30 TAB | Refills: 1 | Status: SHIPPED | OUTPATIENT
Start: 2019-01-01 | End: 2019-01-01 | Stop reason: SDUPTHER

## 2019-01-01 RX ORDER — VANCOMYCIN/0.9 % SOD CHLORIDE 1 G/100 ML
1000 PLASTIC BAG, INJECTION (ML) INTRAVENOUS
Status: DISCONTINUED | OUTPATIENT
Start: 2019-01-01 | End: 2019-01-01

## 2019-01-01 RX ORDER — PHENOL/SODIUM PHENOLATE
20 AEROSOL, SPRAY (ML) MUCOUS MEMBRANE DAILY
COMMUNITY
End: 2019-01-01 | Stop reason: DRUGHIGH

## 2019-01-01 RX ORDER — MORPHINE SULFATE 2 MG/ML
1 INJECTION, SOLUTION INTRAMUSCULAR; INTRAVENOUS
Status: DISCONTINUED | OUTPATIENT
Start: 2019-01-01 | End: 2019-01-01 | Stop reason: HOSPADM

## 2019-01-01 RX ORDER — POLYETHYLENE GLYCOL 3350 17 G/17G
17 POWDER, FOR SOLUTION ORAL
Qty: 30 PACKET | Refills: 1 | Status: SHIPPED | OUTPATIENT
Start: 2019-01-01 | End: 2019-01-01

## 2019-01-01 RX ORDER — ALBUMIN HUMAN 50 G/1000ML
25 SOLUTION INTRAVENOUS EVERY 8 HOURS
Status: COMPLETED | OUTPATIENT
Start: 2019-01-01 | End: 2019-01-01

## 2019-01-01 RX ORDER — DIPHENHYDRAMINE HCL 25 MG
25 CAPSULE ORAL AS NEEDED
Status: DISCONTINUED | OUTPATIENT
Start: 2019-01-01 | End: 2019-01-01 | Stop reason: HOSPADM

## 2019-01-01 RX ORDER — BUSPIRONE HYDROCHLORIDE 10 MG/1
10 TABLET ORAL 3 TIMES DAILY
Status: DISCONTINUED | OUTPATIENT
Start: 2019-01-01 | End: 2019-01-01 | Stop reason: HOSPADM

## 2019-01-01 RX ORDER — IPRATROPIUM BROMIDE AND ALBUTEROL SULFATE 2.5; .5 MG/3ML; MG/3ML
SOLUTION RESPIRATORY (INHALATION)
Status: DISCONTINUED
Start: 2019-01-01 | End: 2019-01-01 | Stop reason: WASHOUT

## 2019-01-01 RX ORDER — FUROSEMIDE 10 MG/ML
40 INJECTION INTRAMUSCULAR; INTRAVENOUS AS NEEDED
Status: DISCONTINUED | OUTPATIENT
Start: 2019-01-01 | End: 2019-01-01 | Stop reason: HOSPADM

## 2019-01-01 RX ORDER — DEXTROSE MONOHYDRATE 100 MG/ML
125-250 INJECTION, SOLUTION INTRAVENOUS AS NEEDED
Status: DISCONTINUED | OUTPATIENT
Start: 2019-01-01 | End: 2019-01-01

## 2019-01-01 RX ORDER — PHENOL/SODIUM PHENOLATE
40 AEROSOL, SPRAY (ML) MUCOUS MEMBRANE DAILY
Qty: 60 TAB | Refills: 3 | Status: SHIPPED | OUTPATIENT
Start: 2019-01-01 | End: 2019-01-01

## 2019-01-01 RX ORDER — BUSPIRONE HYDROCHLORIDE 7.5 MG/1
7.5 TABLET ORAL 3 TIMES DAILY
Qty: 90 TAB | Refills: 0 | Status: SHIPPED | OUTPATIENT
Start: 2019-01-01 | End: 2019-01-01 | Stop reason: SDUPTHER

## 2019-01-01 RX ORDER — DEXTROSE MONOHYDRATE 50 MG/ML
100 INJECTION, SOLUTION INTRAVENOUS CONTINUOUS
Status: DISCONTINUED | OUTPATIENT
Start: 2019-01-01 | End: 2019-01-01

## 2019-01-01 RX ORDER — LANOLIN ALCOHOL/MO/W.PET/CERES
CREAM (GRAM) TOPICAL DAILY
COMMUNITY
End: 2019-01-01

## 2019-01-01 RX ORDER — METOCLOPRAMIDE 10 MG/1
5 TABLET ORAL
COMMUNITY
End: 2019-01-01

## 2019-01-01 RX ORDER — BUSPIRONE HYDROCHLORIDE 10 MG/1
10 TABLET ORAL 3 TIMES DAILY
Qty: 30 TAB | Refills: 0 | Status: SHIPPED | OUTPATIENT
Start: 2019-01-01

## 2019-01-01 RX ORDER — PHYTONADIONE 5 MG/1
10 TABLET ORAL
Status: DISCONTINUED | OUTPATIENT
Start: 2019-01-01 | End: 2019-01-01 | Stop reason: CLARIF

## 2019-01-01 RX ADMIN — IOPAMIDOL 58 ML: 755 INJECTION, SOLUTION INTRAVENOUS at 11:40

## 2019-01-01 RX ADMIN — PHYTONADIONE 10 MG: 10 INJECTION, EMULSION INTRAMUSCULAR; INTRAVENOUS; SUBCUTANEOUS at 09:26

## 2019-01-01 RX ADMIN — INSULIN LISPRO 2 UNITS: 100 INJECTION, SOLUTION INTRAVENOUS; SUBCUTANEOUS at 23:04

## 2019-01-01 RX ADMIN — SODIUM CHLORIDE 40 MG: 9 INJECTION INTRAMUSCULAR; INTRAVENOUS; SUBCUTANEOUS at 22:35

## 2019-01-01 RX ADMIN — PIPERACILLIN SODIUM AND TAZOBACTAM SODIUM 3.38 G: 3; .375 INJECTION, POWDER, LYOPHILIZED, FOR SOLUTION INTRAVENOUS at 05:13

## 2019-01-01 RX ADMIN — POTASSIUM CHLORIDE 20 MEQ: 200 INJECTION, SOLUTION INTRAVENOUS at 17:48

## 2019-01-01 RX ADMIN — INSULIN LISPRO 2 UNITS: 100 INJECTION, SOLUTION INTRAVENOUS; SUBCUTANEOUS at 16:15

## 2019-01-01 RX ADMIN — POTASSIUM CHLORIDE 10 MEQ: 10 INJECTION, SOLUTION INTRAVENOUS at 17:09

## 2019-01-01 RX ADMIN — BUSPIRONE HYDROCHLORIDE 10 MG: 10 TABLET ORAL at 16:09

## 2019-01-01 RX ADMIN — Medication 10 ML: at 05:31

## 2019-01-01 RX ADMIN — INSULIN LISPRO 2 UNITS: 100 INJECTION, SOLUTION INTRAVENOUS; SUBCUTANEOUS at 01:41

## 2019-01-01 RX ADMIN — POTASSIUM CHLORIDE 20 MEQ: 20 TABLET, EXTENDED RELEASE ORAL at 05:53

## 2019-01-01 RX ADMIN — POTASSIUM CHLORIDE: 2 INJECTION, SOLUTION, CONCENTRATE INTRAVENOUS at 08:00

## 2019-01-01 RX ADMIN — INSULIN LISPRO 2 UNITS: 100 INJECTION, SOLUTION INTRAVENOUS; SUBCUTANEOUS at 23:41

## 2019-01-01 RX ADMIN — LACTULOSE 45 ML: 20 SOLUTION ORAL at 14:09

## 2019-01-01 RX ADMIN — CEFEPIME 2 G: 2 INJECTION, POWDER, FOR SOLUTION INTRAVENOUS at 10:00

## 2019-01-01 RX ADMIN — POTASSIUM CHLORIDE 20 MEQ: 200 INJECTION, SOLUTION INTRAVENOUS at 13:34

## 2019-01-01 RX ADMIN — SODIUM CHLORIDE 40 MG: 9 INJECTION INTRAMUSCULAR; INTRAVENOUS; SUBCUTANEOUS at 21:41

## 2019-01-01 RX ADMIN — INSULIN LISPRO 2 UNITS: 100 INJECTION, SOLUTION INTRAVENOUS; SUBCUTANEOUS at 21:28

## 2019-01-01 RX ADMIN — LACTULOSE 45 ML: 20 SOLUTION ORAL at 08:58

## 2019-01-01 RX ADMIN — INSULIN LISPRO 2 UNITS: 100 INJECTION, SOLUTION INTRAVENOUS; SUBCUTANEOUS at 17:39

## 2019-01-01 RX ADMIN — Medication 10 ML: at 14:00

## 2019-01-01 RX ADMIN — BUSPIRONE HYDROCHLORIDE 10 MG: 10 TABLET ORAL at 16:58

## 2019-01-01 RX ADMIN — SODIUM CHLORIDE 40 MG: 9 INJECTION INTRAMUSCULAR; INTRAVENOUS; SUBCUTANEOUS at 08:56

## 2019-01-01 RX ADMIN — PHYTONADIONE 10 MG: 10 INJECTION, EMULSION INTRAMUSCULAR; INTRAVENOUS; SUBCUTANEOUS at 13:32

## 2019-01-01 RX ADMIN — BUSPIRONE HYDROCHLORIDE 10 MG: 10 TABLET ORAL at 18:41

## 2019-01-01 RX ADMIN — Medication 10 ML: at 13:39

## 2019-01-01 RX ADMIN — PIPERACILLIN SODIUM AND TAZOBACTAM SODIUM 3.38 G: 3; .375 INJECTION, POWDER, LYOPHILIZED, FOR SOLUTION INTRAVENOUS at 01:07

## 2019-01-01 RX ADMIN — Medication 10 ML: at 23:45

## 2019-01-01 RX ADMIN — CEFEPIME 2 G: 2 INJECTION, POWDER, FOR SOLUTION INTRAVENOUS at 09:24

## 2019-01-01 RX ADMIN — BUSPIRONE HYDROCHLORIDE 10 MG: 10 TABLET ORAL at 10:28

## 2019-01-01 RX ADMIN — CEFEPIME 2 G: 2 INJECTION, POWDER, FOR SOLUTION INTRAVENOUS at 11:13

## 2019-01-01 RX ADMIN — POTASSIUM CHLORIDE 20 MEQ: 20 TABLET, EXTENDED RELEASE ORAL at 17:55

## 2019-01-01 RX ADMIN — PIPERACILLIN SODIUM AND TAZOBACTAM SODIUM 3.38 G: 3; .375 INJECTION, POWDER, LYOPHILIZED, FOR SOLUTION INTRAVENOUS at 10:04

## 2019-01-01 RX ADMIN — SODIUM CHLORIDE 40 MG: 9 INJECTION INTRAMUSCULAR; INTRAVENOUS; SUBCUTANEOUS at 10:09

## 2019-01-01 RX ADMIN — DEXTROSE MONOHYDRATE 50 ML/HR: 5 INJECTION, SOLUTION INTRAVENOUS at 23:29

## 2019-01-01 RX ADMIN — SODIUM CHLORIDE 40 MG: 9 INJECTION INTRAMUSCULAR; INTRAVENOUS; SUBCUTANEOUS at 10:10

## 2019-01-01 RX ADMIN — BUSPIRONE HYDROCHLORIDE 10 MG: 10 TABLET ORAL at 22:11

## 2019-01-01 RX ADMIN — INSULIN LISPRO 2 UNITS: 100 INJECTION, SOLUTION INTRAVENOUS; SUBCUTANEOUS at 12:04

## 2019-01-01 RX ADMIN — INSULIN LISPRO 4 UNITS: 100 INJECTION, SOLUTION INTRAVENOUS; SUBCUTANEOUS at 08:37

## 2019-01-01 RX ADMIN — BUSPIRONE HYDROCHLORIDE 10 MG: 10 TABLET ORAL at 16:10

## 2019-01-01 RX ADMIN — PHYTONADIONE 10 MG: 10 INJECTION, EMULSION INTRAMUSCULAR; INTRAVENOUS; SUBCUTANEOUS at 09:41

## 2019-01-01 RX ADMIN — SODIUM CHLORIDE 40 MG: 9 INJECTION INTRAMUSCULAR; INTRAVENOUS; SUBCUTANEOUS at 09:12

## 2019-01-01 RX ADMIN — SODIUM CHLORIDE 40 MG: 9 INJECTION INTRAMUSCULAR; INTRAVENOUS; SUBCUTANEOUS at 08:58

## 2019-01-01 RX ADMIN — DEXTROSE MONOHYDRATE 50 ML/HR: 5 INJECTION, SOLUTION INTRAVENOUS at 02:20

## 2019-01-01 RX ADMIN — BUSPIRONE HYDROCHLORIDE 10 MG: 10 TABLET ORAL at 17:30

## 2019-01-01 RX ADMIN — DEXTROSE MONOHYDRATE 50 ML/HR: 5 INJECTION, SOLUTION INTRAVENOUS at 19:50

## 2019-01-01 RX ADMIN — DEXTROSE MONOHYDRATE 50 ML/HR: 5 INJECTION, SOLUTION INTRAVENOUS at 03:36

## 2019-01-01 RX ADMIN — VANCOMYCIN HYDROCHLORIDE 1000 MG: 10 INJECTION, POWDER, LYOPHILIZED, FOR SOLUTION INTRAVENOUS at 08:59

## 2019-01-01 RX ADMIN — LACTULOSE 500 ML: 10 SOLUTION ORAL at 04:45

## 2019-01-01 RX ADMIN — CEFEPIME 2 G: 2 INJECTION, POWDER, FOR SOLUTION INTRAVENOUS at 22:00

## 2019-01-01 RX ADMIN — Medication 10 ML: at 21:28

## 2019-01-01 RX ADMIN — DEXTROSE MONOHYDRATE 100 ML/HR: 5 INJECTION, SOLUTION INTRAVENOUS at 17:18

## 2019-01-01 RX ADMIN — BUSPIRONE HYDROCHLORIDE 10 MG: 10 TABLET ORAL at 18:07

## 2019-01-01 RX ADMIN — POTASSIUM CHLORIDE: 2 INJECTION, SOLUTION, CONCENTRATE INTRAVENOUS at 00:35

## 2019-01-01 RX ADMIN — INSULIN LISPRO 2 UNITS: 100 INJECTION, SOLUTION INTRAVENOUS; SUBCUTANEOUS at 10:39

## 2019-01-01 RX ADMIN — PIPERACILLIN SODIUM AND TAZOBACTAM SODIUM 3.38 G: 3; .375 INJECTION, POWDER, LYOPHILIZED, FOR SOLUTION INTRAVENOUS at 17:21

## 2019-01-01 RX ADMIN — PHYTONADIONE 10 MG: 10 INJECTION, EMULSION INTRAMUSCULAR; INTRAVENOUS; SUBCUTANEOUS at 08:59

## 2019-01-01 RX ADMIN — SODIUM CHLORIDE 40 MG: 9 INJECTION INTRAMUSCULAR; INTRAVENOUS; SUBCUTANEOUS at 21:05

## 2019-01-01 RX ADMIN — PIPERACILLIN SODIUM AND TAZOBACTAM SODIUM 3.38 G: 3; .375 INJECTION, POWDER, LYOPHILIZED, FOR SOLUTION INTRAVENOUS at 09:10

## 2019-01-01 RX ADMIN — POTASSIUM CHLORIDE: 2 INJECTION, SOLUTION, CONCENTRATE INTRAVENOUS at 04:03

## 2019-01-01 RX ADMIN — PHYTONADIONE 10 MG: 10 INJECTION, EMULSION INTRAMUSCULAR; INTRAVENOUS; SUBCUTANEOUS at 08:51

## 2019-01-01 RX ADMIN — SODIUM CHLORIDE 500 ML: 900 INJECTION, SOLUTION INTRAVENOUS at 16:20

## 2019-01-01 RX ADMIN — Medication 10 ML: at 22:35

## 2019-01-01 RX ADMIN — INSULIN LISPRO 2 UNITS: 100 INJECTION, SOLUTION INTRAVENOUS; SUBCUTANEOUS at 10:10

## 2019-01-01 RX ADMIN — Medication 10 ML: at 15:27

## 2019-01-01 RX ADMIN — CEFEPIME 2 G: 2 INJECTION, POWDER, FOR SOLUTION INTRAVENOUS at 16:24

## 2019-01-01 RX ADMIN — BUSPIRONE HYDROCHLORIDE 10 MG: 10 TABLET ORAL at 21:44

## 2019-01-01 RX ADMIN — POTASSIUM CHLORIDE: 2 INJECTION, SOLUTION, CONCENTRATE INTRAVENOUS at 05:09

## 2019-01-01 RX ADMIN — LACTULOSE 45 ML: 20 SOLUTION ORAL at 17:44

## 2019-01-01 RX ADMIN — DEXTROSE MONOHYDRATE 100 ML/HR: 5 INJECTION, SOLUTION INTRAVENOUS at 02:39

## 2019-01-01 RX ADMIN — DEXTROSE MONOHYDRATE 100 ML/HR: 5 INJECTION, SOLUTION INTRAVENOUS at 05:00

## 2019-01-01 RX ADMIN — INSULIN LISPRO 4 UNITS: 100 INJECTION, SOLUTION INTRAVENOUS; SUBCUTANEOUS at 18:07

## 2019-01-01 RX ADMIN — BUSPIRONE HYDROCHLORIDE 10 MG: 10 TABLET ORAL at 17:55

## 2019-01-01 RX ADMIN — BUSPIRONE HYDROCHLORIDE 10 MG: 10 TABLET ORAL at 21:34

## 2019-01-01 RX ADMIN — CEFEPIME 2 G: 2 INJECTION, POWDER, FOR SOLUTION INTRAVENOUS at 23:46

## 2019-01-01 RX ADMIN — INSULIN LISPRO 4 UNITS: 100 INJECTION, SOLUTION INTRAVENOUS; SUBCUTANEOUS at 12:49

## 2019-01-01 RX ADMIN — INSULIN LISPRO 4 UNITS: 100 INJECTION, SOLUTION INTRAVENOUS; SUBCUTANEOUS at 22:51

## 2019-01-01 RX ADMIN — SODIUM CHLORIDE 40 MG: 9 INJECTION INTRAMUSCULAR; INTRAVENOUS; SUBCUTANEOUS at 21:56

## 2019-01-01 RX ADMIN — POTASSIUM CHLORIDE 10 MEQ: 7.46 INJECTION, SOLUTION INTRAVENOUS at 11:00

## 2019-01-01 RX ADMIN — POTASSIUM CHLORIDE: 2 INJECTION, SOLUTION, CONCENTRATE INTRAVENOUS at 03:13

## 2019-01-01 RX ADMIN — POTASSIUM CHLORIDE 10 MEQ: 7.46 INJECTION, SOLUTION INTRAVENOUS at 10:30

## 2019-01-01 RX ADMIN — DEXTROSE MONOHYDRATE 250 ML: 10 INJECTION, SOLUTION INTRAVENOUS at 21:44

## 2019-01-01 RX ADMIN — POTASSIUM CHLORIDE 10 MEQ: 7.46 INJECTION, SOLUTION INTRAVENOUS at 10:00

## 2019-01-01 RX ADMIN — MORPHINE SULFATE 1 MG: 2 INJECTION, SOLUTION INTRAMUSCULAR; INTRAVENOUS at 21:41

## 2019-01-01 RX ADMIN — INSULIN LISPRO 2 UNITS: 100 INJECTION, SOLUTION INTRAVENOUS; SUBCUTANEOUS at 17:20

## 2019-01-01 RX ADMIN — Medication 10 ML: at 05:00

## 2019-01-01 RX ADMIN — POTASSIUM CHLORIDE: 2 INJECTION, SOLUTION, CONCENTRATE INTRAVENOUS at 02:59

## 2019-01-01 RX ADMIN — INSULIN LISPRO 2 UNITS: 100 INJECTION, SOLUTION INTRAVENOUS; SUBCUTANEOUS at 21:35

## 2019-01-01 RX ADMIN — SODIUM CHLORIDE 40 MG: 9 INJECTION INTRAMUSCULAR; INTRAVENOUS; SUBCUTANEOUS at 11:20

## 2019-01-01 RX ADMIN — BUSPIRONE HYDROCHLORIDE 10 MG: 10 TABLET ORAL at 08:28

## 2019-01-01 RX ADMIN — INSULIN LISPRO 2 UNITS: 100 INJECTION, SOLUTION INTRAVENOUS; SUBCUTANEOUS at 22:00

## 2019-01-01 RX ADMIN — POTASSIUM CHLORIDE 10 MEQ: 7.46 INJECTION, SOLUTION INTRAVENOUS at 12:51

## 2019-01-01 RX ADMIN — HYDROCORTISONE SODIUM SUCCINATE 100 MG: 100 INJECTION, POWDER, FOR SOLUTION INTRAMUSCULAR; INTRAVENOUS at 22:27

## 2019-01-01 RX ADMIN — SODIUM CHLORIDE 40 MG: 9 INJECTION INTRAMUSCULAR; INTRAVENOUS; SUBCUTANEOUS at 22:11

## 2019-01-01 RX ADMIN — PIPERACILLIN SODIUM AND TAZOBACTAM SODIUM 3.38 G: 3; .375 INJECTION, POWDER, LYOPHILIZED, FOR SOLUTION INTRAVENOUS at 14:09

## 2019-01-01 RX ADMIN — BUSPIRONE HYDROCHLORIDE 10 MG: 10 TABLET ORAL at 22:34

## 2019-01-01 RX ADMIN — SODIUM CHLORIDE 40 MG: 9 INJECTION INTRAMUSCULAR; INTRAVENOUS; SUBCUTANEOUS at 21:19

## 2019-01-01 RX ADMIN — DEXTROSE MONOHYDRATE 100 ML/HR: 5 INJECTION, SOLUTION INTRAVENOUS at 01:30

## 2019-01-01 RX ADMIN — VANCOMYCIN HYDROCHLORIDE 1000 MG: 10 INJECTION, POWDER, LYOPHILIZED, FOR SOLUTION INTRAVENOUS at 15:00

## 2019-01-01 RX ADMIN — BUSPIRONE HYDROCHLORIDE 10 MG: 10 TABLET ORAL at 10:10

## 2019-01-01 RX ADMIN — INSULIN LISPRO 2 UNITS: 100 INJECTION, SOLUTION INTRAVENOUS; SUBCUTANEOUS at 12:07

## 2019-01-01 RX ADMIN — ALBUMIN (HUMAN) 25 G: 0.25 INJECTION, SOLUTION INTRAVENOUS at 03:36

## 2019-01-01 RX ADMIN — BUSPIRONE HYDROCHLORIDE 10 MG: 10 TABLET ORAL at 17:44

## 2019-01-01 RX ADMIN — BUSPIRONE HYDROCHLORIDE 10 MG: 10 TABLET ORAL at 17:20

## 2019-01-01 RX ADMIN — SODIUM CHLORIDE 40 MG: 9 INJECTION INTRAMUSCULAR; INTRAVENOUS; SUBCUTANEOUS at 21:21

## 2019-01-01 RX ADMIN — POTASSIUM CHLORIDE: 2 INJECTION, SOLUTION, CONCENTRATE INTRAVENOUS at 01:59

## 2019-01-01 RX ADMIN — DEXTROSE MONOHYDRATE 50 ML/HR: 5 INJECTION, SOLUTION INTRAVENOUS at 21:50

## 2019-01-01 RX ADMIN — Medication 10 ML: at 14:33

## 2019-01-01 RX ADMIN — LACTULOSE 45 ML: 20 SOLUTION ORAL at 12:50

## 2019-01-01 RX ADMIN — DEXTROSE MONOHYDRATE 50 ML/HR: 5 INJECTION, SOLUTION INTRAVENOUS at 22:13

## 2019-01-01 RX ADMIN — SODIUM CHLORIDE 40 MG: 9 INJECTION INTRAMUSCULAR; INTRAVENOUS; SUBCUTANEOUS at 09:24

## 2019-01-01 RX ADMIN — BUSPIRONE HYDROCHLORIDE 10 MG: 10 TABLET ORAL at 21:42

## 2019-01-01 RX ADMIN — MIDAZOLAM HYDROCHLORIDE 1 MG: 1 INJECTION, SOLUTION INTRAMUSCULAR; INTRAVENOUS at 12:45

## 2019-01-01 RX ADMIN — Medication 400 MG: at 14:42

## 2019-01-01 RX ADMIN — BUSPIRONE HYDROCHLORIDE 10 MG: 10 TABLET ORAL at 17:40

## 2019-01-01 RX ADMIN — BUSPIRONE HYDROCHLORIDE 10 MG: 10 TABLET ORAL at 22:13

## 2019-01-01 RX ADMIN — BUSPIRONE HYDROCHLORIDE 10 MG: 10 TABLET ORAL at 08:37

## 2019-01-01 RX ADMIN — POTASSIUM CHLORIDE: 2 INJECTION, SOLUTION, CONCENTRATE INTRAVENOUS at 09:19

## 2019-01-01 RX ADMIN — ACETAMINOPHEN 500 MG: 500 TABLET ORAL at 08:51

## 2019-01-01 RX ADMIN — INSULIN LISPRO 2 UNITS: 100 INJECTION, SOLUTION INTRAVENOUS; SUBCUTANEOUS at 12:20

## 2019-01-01 RX ADMIN — PIPERACILLIN SODIUM AND TAZOBACTAM SODIUM 3.38 G: 3; .375 INJECTION, POWDER, LYOPHILIZED, FOR SOLUTION INTRAVENOUS at 11:54

## 2019-01-01 RX ADMIN — POTASSIUM CHLORIDE 10 MEQ: 10 INJECTION, SOLUTION INTRAVENOUS at 15:52

## 2019-01-01 RX ADMIN — CEFEPIME 2 G: 2 INJECTION, POWDER, FOR SOLUTION INTRAVENOUS at 21:24

## 2019-01-01 RX ADMIN — INSULIN LISPRO 2 UNITS: 100 INJECTION, SOLUTION INTRAVENOUS; SUBCUTANEOUS at 17:44

## 2019-01-01 RX ADMIN — CEFEPIME 2 G: 2 INJECTION, POWDER, FOR SOLUTION INTRAVENOUS at 11:49

## 2019-01-01 RX ADMIN — BUSPIRONE HYDROCHLORIDE 10 MG: 10 TABLET ORAL at 21:20

## 2019-01-01 RX ADMIN — DIPHENHYDRAMINE HYDROCHLORIDE 25 MG: 25 CAPSULE ORAL at 08:51

## 2019-01-01 RX ADMIN — POTASSIUM CHLORIDE: 2 INJECTION, SOLUTION, CONCENTRATE INTRAVENOUS at 07:07

## 2019-01-01 RX ADMIN — ALBUMIN (HUMAN) 25 G: 0.25 INJECTION, SOLUTION INTRAVENOUS at 05:00

## 2019-01-01 RX ADMIN — Medication 10 ML: at 05:59

## 2019-01-01 RX ADMIN — IOPAMIDOL 80 ML: 612 INJECTION, SOLUTION INTRAVENOUS at 18:50

## 2019-01-01 RX ADMIN — ALBUMIN (HUMAN) 25 G: 12.5 INJECTION, SOLUTION INTRAVENOUS at 23:46

## 2019-01-01 RX ADMIN — FENTANYL CITRATE 50 MCG: 50 INJECTION INTRAMUSCULAR; INTRAVENOUS at 12:45

## 2019-01-01 RX ADMIN — POTASSIUM CHLORIDE 10 MEQ: 7.46 INJECTION, SOLUTION INTRAVENOUS at 08:00

## 2019-01-01 RX ADMIN — LACTULOSE 45 ML: 20 SOLUTION ORAL at 08:59

## 2019-01-01 RX ADMIN — BUSPIRONE HYDROCHLORIDE 10 MG: 10 TABLET ORAL at 21:49

## 2019-01-01 RX ADMIN — ALBUMIN (HUMAN) 25 G: 0.25 INJECTION, SOLUTION INTRAVENOUS at 09:00

## 2019-01-01 RX ADMIN — PIPERACILLIN SODIUM AND TAZOBACTAM SODIUM 3.38 G: 3; .375 INJECTION, POWDER, LYOPHILIZED, FOR SOLUTION INTRAVENOUS at 06:00

## 2019-01-01 RX ADMIN — SODIUM CHLORIDE 40 MG: 9 INJECTION INTRAMUSCULAR; INTRAVENOUS; SUBCUTANEOUS at 11:48

## 2019-01-01 RX ADMIN — CEFEPIME 2 G: 2 INJECTION, POWDER, FOR SOLUTION INTRAVENOUS at 21:49

## 2019-01-01 RX ADMIN — PIPERACILLIN SODIUM AND TAZOBACTAM SODIUM 3.38 G: 3; .375 INJECTION, POWDER, LYOPHILIZED, FOR SOLUTION INTRAVENOUS at 20:00

## 2019-01-01 RX ADMIN — POTASSIUM CHLORIDE: 2 INJECTION, SOLUTION, CONCENTRATE INTRAVENOUS at 23:20

## 2019-01-01 RX ADMIN — POTASSIUM CHLORIDE 20 MEQ: 200 INJECTION, SOLUTION INTRAVENOUS at 15:42

## 2019-01-01 RX ADMIN — ALBUMIN (HUMAN) 25 G: 12.5 INJECTION, SOLUTION INTRAVENOUS at 17:06

## 2019-01-01 RX ADMIN — VANCOMYCIN HYDROCHLORIDE 1250 MG: 10 INJECTION, POWDER, LYOPHILIZED, FOR SOLUTION INTRAVENOUS at 22:36

## 2019-01-01 RX ADMIN — DEXTROSE MONOHYDRATE 50 ML/HR: 5 INJECTION, SOLUTION INTRAVENOUS at 05:47

## 2019-01-01 RX ADMIN — PIPERACILLIN SODIUM AND TAZOBACTAM SODIUM 3.38 G: 3; .375 INJECTION, POWDER, LYOPHILIZED, FOR SOLUTION INTRAVENOUS at 02:39

## 2019-01-01 RX ADMIN — SODIUM CHLORIDE 10 ML: 9 INJECTION, SOLUTION INTRAMUSCULAR; INTRAVENOUS; SUBCUTANEOUS at 11:15

## 2019-01-01 RX ADMIN — PHYTONADIONE 10 MG: 10 INJECTION, EMULSION INTRAMUSCULAR; INTRAVENOUS; SUBCUTANEOUS at 21:42

## 2019-01-01 RX ADMIN — INSULIN LISPRO 2 UNITS: 100 INJECTION, SOLUTION INTRAVENOUS; SUBCUTANEOUS at 23:46

## 2019-01-01 RX ADMIN — POTASSIUM CHLORIDE 10 MEQ: 7.46 INJECTION, SOLUTION INTRAVENOUS at 09:00

## 2019-01-01 RX ADMIN — SODIUM CHLORIDE 40 MG: 9 INJECTION INTRAMUSCULAR; INTRAVENOUS; SUBCUTANEOUS at 21:34

## 2019-01-01 RX ADMIN — LACTULOSE 45 ML: 20 SOLUTION ORAL at 14:33

## 2019-01-01 RX ADMIN — ALBUMIN (HUMAN) 25 G: 12.5 INJECTION, SOLUTION INTRAVENOUS at 14:32

## 2019-01-01 RX ADMIN — PHYTONADIONE 10 MG: 10 INJECTION, EMULSION INTRAMUSCULAR; INTRAVENOUS; SUBCUTANEOUS at 08:41

## 2019-01-01 RX ADMIN — POTASSIUM CHLORIDE 40 MEQ: 1.5 POWDER, FOR SOLUTION ORAL at 14:42

## 2019-01-01 RX ADMIN — PIPERACILLIN SODIUM AND TAZOBACTAM SODIUM 3.38 G: 3; .375 INJECTION, POWDER, LYOPHILIZED, FOR SOLUTION INTRAVENOUS at 09:08

## 2019-01-01 RX ADMIN — DEXTROSE MONOHYDRATE 100 ML/HR: 5 INJECTION, SOLUTION INTRAVENOUS at 05:18

## 2019-01-01 RX ADMIN — LACTULOSE 500 ML: 10 SOLUTION ORAL at 13:58

## 2019-01-01 RX ADMIN — Medication 10 ML: at 05:18

## 2019-01-01 RX ADMIN — Medication 10 ML: at 05:01

## 2019-01-01 RX ADMIN — LACTULOSE 45 ML: 20 SOLUTION ORAL at 22:14

## 2019-01-01 RX ADMIN — SODIUM CHLORIDE 40 MG: 9 INJECTION INTRAMUSCULAR; INTRAVENOUS; SUBCUTANEOUS at 10:22

## 2019-01-01 RX ADMIN — POTASSIUM CHLORIDE: 2 INJECTION, SOLUTION, CONCENTRATE INTRAVENOUS at 01:39

## 2019-01-01 RX ADMIN — Medication 10 ML: at 22:12

## 2019-01-01 RX ADMIN — FENTANYL CITRATE 50 MCG: 50 INJECTION, SOLUTION INTRAMUSCULAR; INTRAVENOUS at 12:45

## 2019-01-01 RX ADMIN — Medication 10 ML: at 21:35

## 2019-01-01 RX ADMIN — SODIUM CHLORIDE 500 ML/HR: 900 INJECTION, SOLUTION INTRAVENOUS at 08:50

## 2019-01-01 RX ADMIN — ALBUMIN (HUMAN) 25 G: 12.5 INJECTION, SOLUTION INTRAVENOUS at 10:07

## 2019-01-01 RX ADMIN — LACTULOSE 45 ML: 20 SOLUTION ORAL at 08:28

## 2019-01-01 RX ADMIN — ALBUMIN (HUMAN) 25 G: 0.25 INJECTION, SOLUTION INTRAVENOUS at 11:59

## 2019-01-01 RX ADMIN — SODIUM CHLORIDE 40 MG: 9 INJECTION INTRAMUSCULAR; INTRAVENOUS; SUBCUTANEOUS at 08:28

## 2019-01-01 RX ADMIN — CEFEPIME 2 G: 2 INJECTION, POWDER, FOR SOLUTION INTRAVENOUS at 10:36

## 2019-01-01 RX ADMIN — LACTULOSE 45 ML: 20 SOLUTION ORAL at 08:38

## 2019-01-01 RX ADMIN — VANCOMYCIN HYDROCHLORIDE 1000 MG: 10 INJECTION, POWDER, LYOPHILIZED, FOR SOLUTION INTRAVENOUS at 10:34

## 2019-01-01 RX ADMIN — LACTULOSE 500 ML: 10 SOLUTION ORAL at 02:27

## 2019-01-01 RX ADMIN — PIPERACILLIN SODIUM AND TAZOBACTAM SODIUM 3.38 G: 3; .375 INJECTION, POWDER, LYOPHILIZED, FOR SOLUTION INTRAVENOUS at 22:14

## 2019-01-01 RX ADMIN — Medication 10 ML: at 15:36

## 2019-01-01 RX ADMIN — SODIUM CHLORIDE 40 MG: 9 INJECTION INTRAMUSCULAR; INTRAVENOUS; SUBCUTANEOUS at 09:25

## 2019-01-01 RX ADMIN — PIPERACILLIN SODIUM AND TAZOBACTAM SODIUM 3.38 G: 3; .375 INJECTION, POWDER, LYOPHILIZED, FOR SOLUTION INTRAVENOUS at 04:26

## 2019-01-01 RX ADMIN — SODIUM CHLORIDE 40 MG: 9 INJECTION INTRAMUSCULAR; INTRAVENOUS; SUBCUTANEOUS at 21:44

## 2019-01-01 RX ADMIN — BUSPIRONE HYDROCHLORIDE 10 MG: 10 TABLET ORAL at 16:23

## 2019-01-01 RX ADMIN — BUSPIRONE HYDROCHLORIDE 10 MG: 10 TABLET ORAL at 09:24

## 2019-01-01 RX ADMIN — Medication 10 ML: at 17:34

## 2019-01-01 RX ADMIN — POTASSIUM CHLORIDE 10 MEQ: 10 INJECTION, SOLUTION INTRAVENOUS at 14:43

## 2019-01-01 RX ADMIN — BUSPIRONE HYDROCHLORIDE 10 MG: 10 TABLET ORAL at 08:59

## 2019-01-01 RX ADMIN — POTASSIUM CHLORIDE 10 MEQ: 7.46 INJECTION, SOLUTION INTRAVENOUS at 11:58

## 2019-01-01 RX ADMIN — SODIUM CHLORIDE 40 MG: 9 INJECTION INTRAMUSCULAR; INTRAVENOUS; SUBCUTANEOUS at 08:38

## 2019-01-01 RX ADMIN — POTASSIUM CHLORIDE 10 MEQ: 7.46 INJECTION, SOLUTION INTRAVENOUS at 13:00

## 2019-01-01 RX ADMIN — BUSPIRONE HYDROCHLORIDE 10 MG: 10 TABLET ORAL at 10:38

## 2019-01-01 RX ADMIN — POTASSIUM CHLORIDE: 2 INJECTION, SOLUTION, CONCENTRATE INTRAVENOUS at 06:09

## 2019-01-01 RX ADMIN — BUSPIRONE HYDROCHLORIDE 10 MG: 10 TABLET ORAL at 21:10

## 2019-01-01 RX ADMIN — BUSPIRONE HYDROCHLORIDE 10 MG: 10 TABLET ORAL at 23:30

## 2019-01-01 RX ADMIN — BUSPIRONE HYDROCHLORIDE 10 MG: 10 TABLET ORAL at 21:27

## 2019-01-01 RX ADMIN — ALBUMIN (HUMAN) 25 G: 0.25 INJECTION, SOLUTION INTRAVENOUS at 23:06

## 2019-01-01 RX ADMIN — CEFEPIME 2 G: 2 INJECTION, POWDER, FOR SOLUTION INTRAVENOUS at 21:59

## 2019-01-01 RX ADMIN — CEFEPIME 2 G: 2 INJECTION, POWDER, FOR SOLUTION INTRAVENOUS at 21:43

## 2019-01-01 RX ADMIN — LACTULOSE 45 ML: 20 SOLUTION ORAL at 21:27

## 2019-01-01 RX ADMIN — SODIUM CHLORIDE 40 MG: 9 INJECTION INTRAMUSCULAR; INTRAVENOUS; SUBCUTANEOUS at 21:28

## 2019-01-01 RX ADMIN — INSULIN LISPRO 2 UNITS: 100 INJECTION, SOLUTION INTRAVENOUS; SUBCUTANEOUS at 08:59

## 2019-01-01 RX ADMIN — LACTULOSE 45 ML: 20 SOLUTION ORAL at 23:00

## 2019-01-01 RX ADMIN — INSULIN LISPRO 2 UNITS: 100 INJECTION, SOLUTION INTRAVENOUS; SUBCUTANEOUS at 22:07

## 2019-01-01 RX ADMIN — SODIUM CHLORIDE 40 MG: 9 INJECTION INTRAMUSCULAR; INTRAVENOUS; SUBCUTANEOUS at 22:14

## 2019-01-01 RX ADMIN — PIPERACILLIN SODIUM AND TAZOBACTAM SODIUM 3.38 G: 3; .375 INJECTION, POWDER, LYOPHILIZED, FOR SOLUTION INTRAVENOUS at 23:30

## 2019-01-01 RX ADMIN — ALBUMIN (HUMAN) 25 G: 0.25 INJECTION, SOLUTION INTRAVENOUS at 21:36

## 2019-01-01 RX ADMIN — INSULIN LISPRO 2 UNITS: 100 INJECTION, SOLUTION INTRAVENOUS; SUBCUTANEOUS at 09:12

## 2019-01-01 RX ADMIN — BUSPIRONE HYDROCHLORIDE 10 MG: 10 TABLET ORAL at 08:57

## 2019-01-01 RX ADMIN — CEFEPIME 2 G: 2 INJECTION, POWDER, FOR SOLUTION INTRAVENOUS at 10:09

## 2019-01-01 RX ADMIN — POTASSIUM CHLORIDE 10 MEQ: 7.46 INJECTION, SOLUTION INTRAVENOUS at 09:43

## 2019-01-01 RX ADMIN — LACTULOSE 45 ML: 20 SOLUTION ORAL at 17:06

## 2019-01-01 RX ADMIN — CEFEPIME 2 G: 2 INJECTION, POWDER, FOR SOLUTION INTRAVENOUS at 21:37

## 2019-01-01 RX ADMIN — POTASSIUM CHLORIDE: 2 INJECTION, SOLUTION, CONCENTRATE INTRAVENOUS at 05:54

## 2019-01-01 RX ADMIN — BUSPIRONE HYDROCHLORIDE 10 MG: 10 TABLET ORAL at 17:34

## 2019-01-01 RX ADMIN — SODIUM CHLORIDE 1000 ML: 900 INJECTION, SOLUTION INTRAVENOUS at 01:30

## 2019-01-01 RX ADMIN — DEXTROSE MONOHYDRATE 50 ML/HR: 5 INJECTION, SOLUTION INTRAVENOUS at 00:26

## 2019-01-01 RX ADMIN — SODIUM CHLORIDE 40 MG: 9 INJECTION INTRAMUSCULAR; INTRAVENOUS; SUBCUTANEOUS at 10:37

## 2019-01-01 RX ADMIN — GELATIN ABSORBABLE SPONGE 12-7 MM 1 EACH: 12-7 MISC at 12:56

## 2019-01-01 RX ADMIN — Medication 10 ML: at 16:04

## 2019-01-01 RX ADMIN — POTASSIUM CHLORIDE 10 MEQ: 7.46 INJECTION, SOLUTION INTRAVENOUS at 10:45

## 2019-01-01 RX ADMIN — POTASSIUM CHLORIDE: 2 INJECTION, SOLUTION, CONCENTRATE INTRAVENOUS at 01:07

## 2019-01-01 RX ADMIN — BUSPIRONE HYDROCHLORIDE 10 MG: 10 TABLET ORAL at 00:28

## 2019-01-01 RX ADMIN — CEFEPIME 2 G: 2 INJECTION, POWDER, FOR SOLUTION INTRAVENOUS at 22:11

## 2019-01-01 RX ADMIN — SODIUM CHLORIDE 40 MG: 9 INJECTION INTRAMUSCULAR; INTRAVENOUS; SUBCUTANEOUS at 21:32

## 2019-01-01 RX ADMIN — LACTULOSE 45 ML: 20 SOLUTION ORAL at 17:32

## 2019-01-01 RX ADMIN — LACTULOSE 500 ML: 10 SOLUTION ORAL at 08:57

## 2019-01-01 RX ADMIN — MIDAZOLAM HYDROCHLORIDE 1 MG: 2 INJECTION, SOLUTION INTRAMUSCULAR; INTRAVENOUS at 12:45

## 2019-01-01 RX ADMIN — CEFEPIME 2 G: 2 INJECTION, POWDER, FOR SOLUTION INTRAVENOUS at 10:10

## 2019-01-01 RX ADMIN — CEFEPIME 2 G: 2 INJECTION, POWDER, FOR SOLUTION INTRAVENOUS at 21:25

## 2019-01-01 RX ADMIN — ALBUMIN (HUMAN) 25 G: 0.25 INJECTION, SOLUTION INTRAVENOUS at 15:00

## 2019-01-01 RX ADMIN — SODIUM CHLORIDE 40 MG: 9 INJECTION INTRAMUSCULAR; INTRAVENOUS; SUBCUTANEOUS at 23:30

## 2019-01-01 RX ADMIN — Medication 10 ML: at 06:32

## 2019-01-01 RX ADMIN — Medication 10 ML: at 23:00

## 2019-01-01 RX ADMIN — POTASSIUM CHLORIDE 10 MEQ: 7.46 INJECTION, SOLUTION INTRAVENOUS at 13:41

## 2019-01-01 RX ADMIN — LACTULOSE 45 ML: 20 SOLUTION ORAL at 13:32

## 2019-01-01 RX ADMIN — DEXTROSE MONOHYDRATE 100 ML/HR: 5 INJECTION, SOLUTION INTRAVENOUS at 06:30

## 2019-01-01 RX ADMIN — Medication 10 ML: at 08:28

## 2019-01-01 RX ADMIN — LACTULOSE 45 ML: 20 SOLUTION ORAL at 18:43

## 2019-01-01 RX ADMIN — POTASSIUM CHLORIDE 10 MEQ: 7.46 INJECTION, SOLUTION INTRAVENOUS at 12:00

## 2019-01-01 RX ADMIN — SODIUM CHLORIDE 40 MG: 9 INJECTION INTRAMUSCULAR; INTRAVENOUS; SUBCUTANEOUS at 23:46

## 2019-01-01 RX ADMIN — INSULIN LISPRO 2 UNITS: 100 INJECTION, SOLUTION INTRAVENOUS; SUBCUTANEOUS at 09:24

## 2019-01-01 RX ADMIN — BUSPIRONE HYDROCHLORIDE 10 MG: 10 TABLET ORAL at 09:11

## 2019-01-01 RX ADMIN — BUSPIRONE HYDROCHLORIDE 10 MG: 10 TABLET ORAL at 17:31

## 2019-01-01 RX ADMIN — BUSPIRONE HYDROCHLORIDE 10 MG: 10 TABLET ORAL at 11:49

## 2019-01-01 RX ADMIN — BUSPIRONE HYDROCHLORIDE 10 MG: 10 TABLET ORAL at 21:25

## 2019-01-01 RX ADMIN — ALBUMIN (HUMAN) 25 G: 0.25 INJECTION, SOLUTION INTRAVENOUS at 19:00

## 2019-01-18 NOTE — TELEPHONE ENCOUNTER
Patient called regarding her BUSPAR 10mg. She states this is on manufacture back order. She is requesting that the 7.5 mg be called in. Or an alternative.      Patient confirms pharmacy: [facility] [address]

## 2019-02-21 NOTE — TELEPHONE ENCOUNTER
Requested Prescriptions     Pending Prescriptions Disp Refills    JARDIANCE 10 mg tablet 30 Tab 1     Sig: Take 1 Tab by mouth daily.        # of pills left: n/a    Last appointment date: 12/24    Next appointment date: n/a  Drug Store: [facility]    Remind patient it can take up to 48 hours to process

## 2019-05-06 NOTE — PROGRESS NOTES
Attempted venipuncture x2 to left arm successfully. Unable to fill tubes. Patient states she will come back later this week for re-draw.

## 2019-05-06 NOTE — PROGRESS NOTES
Chief Complaint Patient presents with  
[facility] Follow Up  
  pancreatic cancer, d/c from [facility] on 4/29/19 1. Have you been to the ER, urgent care clinic or hospitalized since your last visit? YES. [facility] 2. Have you seen or consulted any other health care providers outside of the [address] [place] [address] since your last visit (Include any pap smears or colon screening)? NO Do you have an Advanced Directive? NO Would you like information on Advanced Directives? NO 
 
HPI Yeni Thurston comes in for follow-up care. Patient has a history of pancreatic adenocarcinoma. She was recently admitted at [facility] from 8 April 2019 to 29 April 2019. She had Whipple procedure done. Since then has slowly advanced her diet and is now able to take orally. She is taking solids and liquids. Appetite is improving gradually. She does have some nausea on and off. No vomiting at the moment. She did have epigastric discomfort and gastroesophageal reflux symptoms. Was given omeprazole. The capsules to cause nausea and she would like to get the tablets. She is taking 40 mg daily. Prescription was sent into the pharmacy. Patient has a history of hypertension prior to surgery. Since surgery however her blood pressure has been on the lower side. She is no longer taking antihypertensive medications. Currently blood pressure is 89/58. She denies headache, changes in vision or focal weakness. Denies dizziness or syncope. Patient has diabetes mellitus type 2. She is on insulin and Jardiance. I did check her HbA1c and this is 5.9. We will continue with the current treatment plan. I did do a foot exam.  She does have long toenails that need to be trimmed. I will place a referral for to be seen by the podiatrist.  Patient has mood disorder with anxiety. She is taking BuSpar 7.5 mg up to 3 times a day. She is stable on this. We will continue with the current treatment plan. Patient has constipation on and off. Would like to have some MiraLAX to take as needed. Prescription will be given for this. Past Medical History Past Medical History:  
Diagnosis Date  Asthma  Cancer ([facility] 11/13/2018 Pancreatic cancer  Diabetes ([facility]  Hypertension  Ill-defined condition \"nerve problem\"  Syphilis Surgical History Past Surgical History:  
Procedure Laterality Date  BREAST SURGERY PROCEDURE UNLISTED    
 unsure of side-benign, lumpectomy  COLONOSCOPY N/A 9/27/2016 COLONOSCOPY with polypectomy. [facility]    
 patient unsure of surgery Medications Current Outpatient Medications Medication Sig Dispense Refill  
 HYDROcodone-acetaminophen (NORCO) 5-325 mg per tablet Take  by mouth.  metoclopramide HCl (REGLAN) 10 mg tablet Take 10 mg by mouth Before breakfast, lunch, dinner and at bedtime.  Omeprazole delayed release (PRILOSEC D/R) 20 mg tablet Take 2 Tabs by mouth daily. 60 Tab 3  JARDIANCE 10 mg tablet Take 1 Tab by mouth daily. 30 Tab 3  polyethylene glycol (MIRALAX) 17 gram packet Take 1 Packet by mouth daily as needed (constipation). 30 Packet 1  
 ergocalciferol (ERGOCALCIFEROL) 50,000 unit capsule TAKE 1 CAP BY MOUTH EVERY WEEK 4 Cap 2  
 busPIRone (BUSPAR) 7.5 mg tablet TAKE 1 TABLET BY MOUTH THREE TIMES A DAY 90 Tab 0  
 LANTUS SOLOSTAR U-100 INSULIN 100 unit/mL (3 mL) inpn INJECT 20 UNITS UNDER THE SKIN NIGHTLY 15 Pen 3  
 guaiFENesin-dextromethorphan (GUAIFENESIN-DM)  mg/5 mL liqd Take 10 mL by mouth every four (4) hours as needed. 240 mL 0  
 Insulin Needles, Disposable, 31 gauge x 5/16\" ndle Use to administer insulin daily 100 Package 3 Allergies No Known Allergies Family History History reviewed. No pertinent family history. Social History Social History Socioeconomic History  Marital status: SINGLE Spouse name: Not on file  Number of children: Not on file  Years of education: Not on file  Highest education level: Not on file Occupational History  Not on file Social Needs  Financial resource strain: Not on file  Food insecurity:  
  Worry: Not on file Inability: Not on file  Transportation needs:  
  Medical: Not on file Non-medical: Not on file Tobacco Use  Smoking status: Never Smoker  Smokeless tobacco: Never Used Substance and Sexual Activity  Alcohol use: No  
  Frequency: Never  Drug use: No  
 Sexual activity: Not on file Lifestyle  Physical activity:  
  Days per week: Not on file Minutes per session: Not on file  Stress: Not on file Relationships  Social connections:  
  Talks on phone: Not on file Gets together: Not on file Attends [employer] service: Not on file Active member of club or organization: Not on file Attends meetings of clubs or organizations: Not on file Relationship status: Not on file  Intimate partner violence:  
  Fear of current or ex partner: Not on file Emotionally abused: Not on file Physically abused: Not on file Forced sexual activity: Not on file Other Topics Concern  Not on file Social History Narrative ** Merged History Encounter ** Review of Systems Review of Systems - Review of all systems is negative except as noted above in the HPI. Vital Signs Visit Vitals BP (!) 89/58 (BP 1 Location: Left arm, BP Patient Position: Sitting) Pulse 94 Temp 97.6 °F (36.4 °C) (Oral) Resp 18 Ht 5' (1.524 m) Wt 116 lb 12.8 oz (53 kg) SpO2 99% BMI 22.81 kg/m² Physical Exam 
Physical Examination: General appearance - oriented to person, place, and time, acyanotic, in no respiratory distress and chronically ill appearing Mental status - affect appropriate to mood Mouth - mucous membranes moist, pharynx normal without lesions Neck - supple, no significant adenopathy Lymphatics - no palpable lymphadenopathy Chest - no tachypnea, retractions or cyanosis Heart - S1 and S2 normal 
Abdomen - no rebound tenderness noted 
bowel sounds normal 
Back exam - limited range of motion Neurological - neck supple without rigidity Musculoskeletal - osteoarthritic changes noted in both hands Extremities - no pedal edema noted, intact peripheral pulses Diabetic foot exam:  
 
Left Foot: 
 Visual Exam: Long toenails Pulse DP: 2+ (normal) Filament test: normal sensation Right Foot: 
 Visual Exam: Elongated toenails Pulse DP: 2+ (normal) Filament test: normal sensation Results Results for orders placed or performed during the hospital encounter of 09/27/16 POC 6 PLUS Result Value Ref Range Sodium,  136 - 145 MMOL/L Potassium, POC 4.5 3.5 - 5.5 MMOL/L Chloride,  100 - 108 MMOL/L  
 BUN, POC 6 (L) 7 - 18 MG/DL Glucose,  (H) 74 - 106 MG/DL Hematocrit, POC 38 36 - 49 % Hemoglobin, POC 12.9 12 - 16 G/DL  
 
 
ASSESSMENT and PLAN 
  ICD-10-CM ICD-9-CM 1. Malignant neoplasm of pancreas, unspecified location of malignancy ([facility] C25.9 157.9 2. Gastroesophageal reflux disease, esophagitis presence not specified K21.9 530.81 Omeprazole delayed release (PRILOSEC D/R) 20 mg tablet 3. Type 2 diabetes mellitus with hyperglycemia, with long-term current use of insulin ([facility]) E11.65 250.00 JARDIANCE 10 mg tablet  
 Z79.4 790.29 AMB POC HEMOGLOBIN A1C  
  V58.67 AMB POC URINE, MICROALBUMIN, SEMIQUANTITATIVE  
   LIPID PANEL  
   METABOLIC PANEL, COMPREHENSIVE  
   CBC W/O DIFF  DIABETES FOOT EXAM  
   REFERRAL TO PODIATRY 4. Screening breast examination Z12.31 V76.10 [facility] MAMMO BI SCREENING INCL CAD 5.  Comprehensive diabetic foot examination, type 2 DM, encounter for ([facility] E11.9 250.00  DIABETES FOOT EXAM  
 REFERRAL TO PODIATRY 6. Constipation, unspecified constipation type K59.00 564.00 polyethylene glycol (MIRALAX) 17 gram packet 7. Mood disorder ([facility] F39 296.90   
 
lab results and schedule of future lab studies reviewed with patient 
reviewed diet, exercise and weight control 
reviewed medications and side effects in detail 
specific diabetic recommendations: low cholesterol diet, weight control and daily exercise discussed, home glucose monitoring emphasized, all medications, side effects and compliance discussed carefully, foot care discussed and Podiatry visits discussed, annual eye examinations at Ophthalmology discussed, glycohemoglobin and other lab monitoring discussed and long term diabetic complications discussed I have discussed the diagnosis with the patient and the intended plan of care as seen in the above orders. The patient has received an after-visit summary and questions were answered concerning future plans. I have discussed medication, side effects, and warnings with the patient in detail. The patient verbalized understanding and is in agreement with the plan of care. The patient will contact the office with any additional concerns. Nilay Borges MD 
 
PLEASE NOTE:  
This document has been produced using voice recognition software. Unrecognized errors in transcription may be present

## 2019-05-08 NOTE — PROGRESS NOTES
Patient presents for lab draw ordered by:     Ordering Provider: Edward Jimenez  Ordering Department/Practice:  [facility]  Phone:  [phone]  Date Ordered:  05/07/2019    The following labs were drawn and sent to [facility] lab at [facility] by Mikayla Victoria LPN:    CBC, Lipid Profile and CMP    The following tubes were sent:    Joelle Hay  ( 1)\\Gel  1    Pt here for lab vist. Venipuncture done in left hand and unable to obtain specimen, venipuncture done in right hand. Blood specimen obtained. Pt tolerated well. No comps.

## 2019-05-09 NOTE — TELEPHONE ENCOUNTER
Received call from [facility] lab reporting critical glucose value of 43. Called home number and spoke with patient's sister in law (on HIPAA) who states that the patient is out of the house right now, but that she seemed to be feeling fine today. She did mention that patient's blood pressure has been running low lately. I encouraged her to discuss this tomorrow with Dr. Diana Olson or his nurse.

## 2019-05-21 NOTE — TELEPHONE ENCOUNTER
Dayanna, from [facility], called stating patient is being discharged with goals met.

## 2019-06-25 NOTE — TELEPHONE ENCOUNTER
Home Delivery called to check the status order of the physician order for Incontinence supplies for this patient.  [phone]

## 2019-07-15 NOTE — TELEPHONE ENCOUNTER
Nataliia Dasilva from [facility] Delivery Team called to check the status of this patient's medical supplies for Incontinence supplies  form that was faxed in June. Please contact Nataliia Dasilva or a documentation team member to advise when they can expect the turn around on this form.

## 2019-07-17 NOTE — PROGRESS NOTES
Chief Complaint   Patient presents with   [facility] Follow Up     jaundice     Leg Swelling     both      1. Have you been to the ER, urgent care clinic since your last visit? Hospitalized since your last visit? Yes When: 07/04/2019  Where: [facility]  Reason for visit: Nausea  . 2. Have you seen or consulted any other health care providers outside of the [address] [place] [address] [place] since your last visit? Include any pap smears or colon screening. No     HPI  Marium Moss comes in for f/u care. Patient was admitted at [facility] from 4 July 2019 to 11 July 2019 with hyperbilirubinemia, MRCP showed no obstruction, likely medication related. Patient has a history of pancreatic adenocarcinoma status post Whipple procedure on April 2019 for [id number] [id number] disease with adjuvant radiation last one done on the day she presented to the hospital.  The adjuvant radiation was for positive lymph nodes and duodenal invasion. As noted above MRCP done was negative for any obstruction and jaundice thought to be due to medication. She will follow-up with her oncologist and medication adjustments may be done. Currently she is at home. She just has generalized weakness. Still has jaundice. Appetite is poor. Has some nausea but no vomiting. She is taking metoclopramide for this. She has some diarrhea that comes on and off. She is on loperamide capsules. She would like to take the tablets of the capsules a difficult to swallow and cause nausea. Patient's blood pressure is low. Systolic is 82 with a diastolic of 53. She is hypotensive. She denies dizziness, chest pain or shortness of breath. She will try and take adequate amounts of fluid. Will follow-up at next visit. If she gets dizziness she does need to go to the emergency room for IV hydration. She would prefer to hold off on this for now. Patient has pedal edema. Both legs are swollen and edematous.   Unable to give diuretic due to low blood pressure. Discussed going to the emergency room but would prefer to hold off on this. Will do elevation and supportive care measures. Patient has diabetes mellitus type 2. Blood glucose numbers have been low and she was previously on Jardiance and Lantus. The Lantus has been discontinued. She is taking the Jardiance 10 mg daily. Blood glucose numbers have been between 80 and 100. Discussed with patient and her sister-in-law. We will keep a blood glucose log. If numbers start dropping below 80 then they should discontinue the Jardiance. Patient has a history of anxiety. She is on BuSpar. We will continue with this medication. She is tolerating this. Patient has a history of vitamin D deficiency. She is on 50,000 units weekly. Continue current treatment plan. Patient has GERD. She is on omeprazole. Continue current treatment plan. Past Medical History  Past Medical History:   Diagnosis Date    Asthma     Cancer ([facility] 11/13/2018    Pancreatic cancer    Diabetes ([facility]     Hypertension     Ill-defined condition     \"nerve problem\"    Syphilis        Surgical History  Past Surgical History:   Procedure Laterality Date    BREAST SURGERY PROCEDURE UNLISTED      unsure of side-benign, lumpectomy    COLONOSCOPY N/A 9/27/2016    COLONOSCOPY with polypectomy. performed by Marlys Stringer MD at [address] [place] [address] GYN      patient unsure of surgery        Medications  Current Outpatient Medications   Medication Sig Dispense Refill    thiamine HCL (VITAMIN B-1) 100 mg tablet Take  by mouth daily.  potassium chloride SR (KLOR-CON 10) 10 mEq tablet Take  by mouth.  loperamide (IMMODIUM) 2 mg tablet Take 1 Tab by mouth four (4) times daily as needed for Diarrhea for up to 10 days.  50 Tab 0    busPIRone (BUSPAR) 7.5 mg tablet TAKE 1 TABLET BY MOUTH THREE TIMES A  Tab 0    metoclopramide HCl (REGLAN) 10 mg tablet Take 10 mg by mouth Before breakfast, lunch, dinner and at bedtime.  Omeprazole delayed release (PRILOSEC D/R) 20 mg tablet Take 2 Tabs by mouth daily. 60 Tab 3    JARDIANCE 10 mg tablet Take 1 Tab by mouth daily. 30 Tab 3    polyethylene glycol (MIRALAX) 17 gram packet Take 1 Packet by mouth daily as needed (constipation). 30 Packet 1    ergocalciferol (ERGOCALCIFEROL) 50,000 unit capsule TAKE 1 CAP BY MOUTH EVERY WEEK 4 Cap 2    Insulin Needles, Disposable, 31 gauge x 5/16\" ndle Use to administer insulin daily 100 Package 3    HYDROcodone-acetaminophen (NORCO) 5-325 mg per tablet Take  by mouth. Allergies  No Known Allergies    Family History  History reviewed. No pertinent family history.     Social History  Social History     Socioeconomic History    Marital status: SINGLE     Spouse name: Not on file    Number of children: Not on file    Years of education: Not on file    Highest education level: Not on file   Occupational History    Not on file   Social Needs    Financial resource strain: Not on file    Food insecurity:     Worry: Not on file     Inability: Not on file    Transportation needs:     Medical: Not on file     Non-medical: Not on file   Tobacco Use    Smoking status: Never Smoker    Smokeless tobacco: Never Used   Substance and Sexual Activity    Alcohol use: No     Frequency: Never    Drug use: No    Sexual activity: Not on file   Lifestyle    Physical activity:     Days per week: Not on file     Minutes per session: Not on file    Stress: Not on file   Relationships    Social connections:     Talks on phone: Not on file     Gets together: Not on file     Attends Yazidism service: Not on file     Active member of club or organization: Not on file     Attends meetings of clubs or organizations: Not on file     Relationship status: Not on file    Intimate partner violence:     Fear of current or ex partner: Not on file     Emotionally abused: Not on file     Physically abused: Not on file     Forced sexual activity: Not on file   Other Topics Concern    Not on file   Social History Narrative    ** Merged History Encounter **            Review of Systems  Review of Systems - Review of all systems is negative except as noted above in the HPI.       Vital Signs  Visit Vitals  BP (!) 82/53 (BP 1 Location: Left arm, BP Patient Position: Sitting)   Pulse 86   Temp 98.2 °F (36.8 °C) (Oral)   Resp 16   Ht 5' (1.524 m)   Wt 130 lb (59 kg)   SpO2 97%   BMI 25.39 kg/m²         Physical Exam  Physical Examination: General appearance - chronically ill appearing  Mental status - affect appropriate to mood  Eyes -scleral icterus  Mouth - mucous membranes moist, pharynx normal without lesions  Neck - supple, no significant adenopathy  Lymphatics - no palpable lymphadenopathy  Chest - decreased air entry noted bilateral lung bases  Heart - S1 and S2 normal, systolic murmur 2/6 at lower left sternal border  Abdomen - no rebound tenderness noted  bowel sounds normal, slightly distended  Back exam - limited range of motion  Neurological - abnormal neurological exam unchanged from prior examinations  Musculoskeletal - osteoarthritic changes noted in both hands  Extremities - intact peripheral pulses    Results  Results for orders placed or performed during the hospital encounter of 05/08/19   CBC W/O DIFF   Result Value Ref Range    WBC 4.6 4.6 - 13.2 K/uL    RBC 3.46 (L) 4.20 - 5.30 M/uL    HGB 10.7 (L) 12.0 - 16.0 g/dL    HCT 34.7 (L) 35.0 - 45.0 %    .3 (H) 74.0 - 97.0 FL    MCH 30.9 24.0 - 34.0 PG    MCHC 30.8 (L) 31.0 - 37.0 g/dL    RDW 14.5 11.6 - 14.5 %    PLATELET 696 278 - 844 K/uL    MPV 10.1 9.2 - 11.8 FL   LIPID PANEL   Result Value Ref Range    LIPID PROFILE          Cholesterol, total 102 <200 MG/DL    Triglyceride 58 <150 MG/DL    HDL Cholesterol 48 40 - 60 MG/DL    LDL, calculated 42.4 0 - 100 MG/DL    VLDL, calculated 11.6 MG/DL    CHOL/HDL Ratio 2.1 0 - 5.0     METABOLIC PANEL, COMPREHENSIVE   Result Value Ref Range    Sodium 142 136 - 145 mmol/L    Potassium 3.3 (L) 3.5 - 5.5 mmol/L    Chloride 110 (H) 100 - 108 mmol/L    CO2 22 21 - 32 mmol/L    Anion gap 10 3.0 - 18 mmol/L    Glucose 43 (LL) 74 - 99 mg/dL    BUN 8 7.0 - 18 MG/DL    Creatinine 0.69 0.6 - 1.3 MG/DL    BUN/Creatinine ratio 12 12 - 20      GFR est AA >60 >60 ml/min/1.73m2    GFR est non-AA >60 >60 ml/min/1.73m2    Calcium 8.8 8.5 - 10.1 MG/DL    Bilirubin, total 0.3 0.2 - 1.0 MG/DL    ALT (SGPT) 28 13 - 56 U/L    AST (SGOT) 43 (H) 15 - 37 U/L    Alk. phosphatase 133 (H) 45 - 117 U/L    Protein, total 6.7 6.4 - 8.2 g/dL    Albumin 3.0 (L) 3.4 - 5.0 g/dL    Globulin 3.7 2.0 - 4.0 g/dL    A-G Ratio 0.8 0.8 - 1.7         ASSESSMENT and PLAN    ICD-10-CM ICD-9-CM    1. Pancreatic adenocarcinoma (HCC) C25.9 157.9    2. Hyperbilirubinemia E80.6 782.4    3. Type 2 diabetes mellitus with hyperglycemia, with long-term current use of insulin (HCC) E11.65 250.00     Z79.4 790.29      V58.67    4. Gastroesophageal reflux disease, esophagitis presence not specified K21.9 530.81    5. Hypovitaminosis D E55.9 268.9    6. Mood disorder ([facility] F39 296.90      lab results and schedule of future lab studies reviewed with patient  reviewed diet, exercise and weight control  cardiovascular risk and specific lipid/LDL goals reviewed  reviewed medications and side effects in detail  specific diabetic recommendations: low cholesterol diet, weight control and daily exercise discussed, home glucose monitoring emphasized, all medications, side effects and compliance discussed carefully, glycohemoglobin and other lab monitoring discussed and long term diabetic complications discussed      I have discussed the diagnosis with the patient and the intended plan of care as seen in the above orders. The patient has received an after-visit summary and questions were answered concerning future plans. I have discussed medication, side effects, and warnings with the patient in detail.  The patient verbalized understanding and is in agreement with the plan of care. The patient will contact the office with any additional concerns. Tenisha Bravo MD    PLEASE NOTE:   This document has been produced using voice recognition software.  Unrecognized errors in transcription may be present

## 2019-07-25 PROBLEM — E87.6 HYPOKALEMIA: Status: ACTIVE | Noted: 2019-01-01

## 2019-07-25 PROBLEM — R17 JAUNDICE: Status: ACTIVE | Noted: 2019-01-01

## 2019-07-25 NOTE — ED NOTES
Pt has not had US yet, per attending physician pt will have to wait for admission transfer until [address] is completed

## 2019-07-25 NOTE — ED TRIAGE NOTES
Patient accompanied to ER by her sister-in-law. Patient presents at request of Dr. Jamison Newton related to abnormal blood results. Patient noted to have jaundice eyes.

## 2019-07-25 NOTE — ED NOTES
TRANSFER - OUT REPORT:    Verbal report given to [facility] G[facility](name) on Jose Davidson  being transferred to [address] [place] [address](unit) for routine progression of care       Report consisted of patients Situation, Background, Assessment and   Recommendations(SBAR). Information from the following report(s) SBAR, Kardex, ED Summary, Procedure Summary, Intake/Output, MAR, Recent Results, Med Rec Status and Cardiac Rhythm sinus tachycardia was reviewed with the receiving nurse. Lines:   Venous Access Device 07/25/19 Upper chest (subclavicular area, right (Active)   Central Line Being Utilized Yes 7/25/2019 12:30 PM   Site Assessment Clean, dry, & intact 7/25/2019 12:30 PM   Date of Last Dressing Change 07/25/19 7/25/2019 12:30 PM   Dressing Status Clean, dry, & intact; New 7/25/2019 12:30 PM   Dressing Type Transparent;Tape 7/25/2019 12:30 PM   Date Accessed (Medial Site) 07/25/19 7/25/2019 12:30 PM   Access Time (Medial Site) 1230 7/25/2019 12:30 PM   Access Needle Length (Medial Site) 0.75 inches 7/25/2019 12:30 PM   Positive Blood Return (Medial Site) Yes 7/25/2019 12:30 PM   Action Taken (Medial Site) Blood drawn;Flushed 7/25/2019 12:30 PM        Opportunity for questions and clarification was provided.       Patient transported with:   Monitor   [facility] transport

## 2019-07-25 NOTE — ED PROVIDER NOTES
EMERGENCY DEPARTMENT HISTORY AND PHYSICAL EXAM    1:29 PM      Date: 7/25/2019  Patient Name: Tito Barajas    History of Presenting Illness     Chief Complaint   Patient presents with    Abnormal Lab Results    Hypotension         History Provided By: Patient  Location/Duration/Severity/Modifying factors   Patient is a 78-year-old female with a history of recent diagnosis of pancreatic adenocarcinoma with Whipple procedures followed by adjuvant chemotherapy and radiation therapy the presents emergency department after being sent by Dr. Sd Starks for evaluation of abnormal labs. Patient presents with her sister-in-law and both are not aware of what the abnormal lab is. They do note that the patient was recently admitted to the hospital as internal for general for elevated liver function test and had multiple tests did not find any need for intervention. Patient has been progressively weakening according to family and now can walk with a Rollator but is becoming increasingly more difficult. While in the hospital the patient developed lower extremity edema which is progressive according to family as well. Patient is without complaint at this time denies any nausea, vomiting, fevers, chills, or abdominal pain. Patient has been having increasing jaundice as well as edema. Patient is compliant with medications and lives with his sister-in-law. Patient denies any other aggravating leaving factors.           PCP: Hugh Powell MD    Current Facility-Administered Medications   Medication Dose Route Frequency Provider Last Rate Last Dose    potassium chloride 10 mEq in 100 ml IVPB  10 mEq IntraVENous Q1H Jc JENKINS  mL/hr at 07/25/19 1552 10 mEq at 07/25/19 1552    sodium chloride 0.9 % bolus infusion 500 mL  500 mL IntraVENous ONCE Jc JENKINS  mL/hr at 07/25/19 1620 500 mL at 07/25/19 1620    sodium chloride (NS) flush 5-10 mL  5-10 mL IntraVENous PRN Kari Woody MD  cefepime (MAXIPIME) 2 g in sterile water (preservative free) 10 mL IV syringe  2 g IntraVENous Q8H Elijah Haynes MD   2 g at 07/25/19 0220     Current Outpatient Medications   Medication Sig Dispense Refill    cholecalciferol (VITAMIN D3) 1,000 unit tablet Take 5,000 Units by mouth daily.  potassium chloride SR (KLOR-CON 10) 10 mEq tablet Take  by mouth.  loperamide (IMMODIUM) 2 mg tablet Take 1 Tab by mouth four (4) times daily as needed for Diarrhea for up to 10 days. 50 Tab 0    busPIRone (BUSPAR) 7.5 mg tablet TAKE 1 TABLET BY MOUTH THREE TIMES A  Tab 0    Omeprazole delayed release (PRILOSEC D/R) 20 mg tablet Take 2 Tabs by mouth daily. 60 Tab 3    JARDIANCE 10 mg tablet Take 1 Tab by mouth daily. 30 Tab 3    metoclopramide HCl (REGLAN) 10 mg tablet Take 5 mg by mouth Before breakfast, lunch, dinner and at bedtime. Past History     Past Medical History:  Past Medical History:   Diagnosis Date    Asthma     Cancer ([facility] 11/13/2018    Pancreatic cancer    Diabetes ([facility]     Hypertension     Ill-defined condition     \"nerve problem\"    Pancreatic adenocarcinoma ([facility]     Syphilis        Past Surgical History:  Past Surgical History:   Procedure Laterality Date    BREAST SURGERY PROCEDURE UNLISTED      unsure of side-benign, lumpectomy    COLONOSCOPY N/A 9/27/2016    COLONOSCOPY with polypectomy. performed by Renita Ford MD at [address] [place] [address] GYN      patient unsure of surgery       Family History:  History reviewed. No pertinent family history. Social History:  Social History     Tobacco Use    Smoking status: Never Smoker    Smokeless tobacco: Never Used   Substance Use Topics    Alcohol use: No     Frequency: Never    Drug use: No       Allergies:  No Known Allergies      Review of Systems       Review of Systems   Constitutional: Positive for activity change and unexpected weight change. Negative for fatigue and fever.    HENT: Negative for congestion and rhinorrhea. Eyes: Negative for visual disturbance. Respiratory: Negative for shortness of breath. Cardiovascular: Negative for chest pain and palpitations. Gastrointestinal: Positive for abdominal distention. Negative for abdominal pain, diarrhea, nausea and vomiting. Genitourinary: Negative for dysuria and hematuria. Musculoskeletal: Negative for back pain. Skin: Positive for color change. Negative for rash. Neurological: Positive for weakness. Negative for dizziness and light-headedness. All other systems reviewed and are negative. Physical Exam     Visit Vitals  BP (!) 87/53   Pulse 94   Temp 98.5 °F (36.9 °C)   Resp 12   Ht 5' (1.524 m)   Wt 59 kg (130 lb)   SpO2 98%   BMI 25.39 kg/m²         Physical Exam   Constitutional: She is oriented to person, place, and time. She appears well-developed and well-nourished. No distress. Globally weak, pale   HENT:   Head: Normocephalic and atraumatic. Right Ear: External ear normal.   Left Ear: External ear normal.   Nose: Nose normal.   Mouth/Throat: Oropharynx is clear and moist.   Eyes: Pupils are equal, round, and reactive to light. Conjunctivae and EOM are normal. Scleral icterus is present. Marked scleral icterus   Neck: Normal range of motion. Neck supple. JVD present. No tracheal deviation present. No thyromegaly present. Cardiovascular: Normal rate, regular rhythm, normal heart sounds and intact distal pulses. Exam reveals no gallop and no friction rub. No murmur heard. Pulmonary/Chest: Effort normal and breath sounds normal. She exhibits no tenderness. Abdominal: Soft. Bowel sounds are normal. She exhibits distension. There is no tenderness. There is no rebound and no guarding. Postsurgical changes   Musculoskeletal: Normal range of motion. She exhibits edema. She exhibits no tenderness. Pitting edema bilateral lower extremities   Lymphadenopathy:     She has no cervical adenopathy.    Neurological: She is alert and oriented to person, place, and time. No cranial nerve deficit. Coordination normal.   Globally weak   Skin: Skin is warm and dry. Psychiatric:   Supportive family at the bedside. Nursing note and vitals reviewed. Diagnostic Study Results     Labs -  Recent Results (from the past 12 hour(s))   CBC WITH AUTOMATED DIFF    Collection Time: 07/25/19 12:30 PM   Result Value Ref Range    WBC 3.3 (L) 4.6 - 13.2 K/uL    RBC 3.58 (L) 4.20 - 5.30 M/uL    HGB 13.3 12.0 - 16.0 g/dL    HCT 37.1 35.0 - 45.0 %    .6 (H) 74.0 - 97.0 FL    MCH 37.2 (H) 24.0 - 34.0 PG    MCHC 35.8 31.0 - 37.0 g/dL    RDW 16.8 (H) 11.6 - 14.5 %    PLATELET 749 299 - 491 K/uL    MPV 9.7 9.2 - 11.8 FL    NEUTROPHILS 76 (H) 40 - 73 %    LYMPHOCYTES 10 (L) 21 - 52 %    MONOCYTES 13 (H) 3 - 10 %    EOSINOPHILS 1 0 - 5 %    BASOPHILS 0 0 - 2 %    ABS. NEUTROPHILS 2.5 1.8 - 8.0 K/UL    ABS. LYMPHOCYTES 0.3 (L) 0.9 - 3.6 K/UL    ABS. MONOCYTES 0.4 0.05 - 1.2 K/UL    ABS. EOSINOPHILS 0.0 0.0 - 0.4 K/UL    ABS. BASOPHILS 0.0 0.0 - 0.1 K/UL    DF AUTOMATED     METABOLIC PANEL, COMPREHENSIVE    Collection Time: 07/25/19 12:30 PM   Result Value Ref Range    Sodium 138 136 - 145 mmol/L    Potassium 2.7 (LL) 3.5 - 5.5 mmol/L    Chloride 103 100 - 111 mmol/L    CO2 25 21 - 32 mmol/L    Anion gap 10 3.0 - 18 mmol/L    Glucose 92 74 - 99 mg/dL    BUN 5 (L) 7.0 - 18 MG/DL    Creatinine 0.78 0.6 - 1.3 MG/DL    BUN/Creatinine ratio 6 (L) 12 - 20      GFR est AA >60 >60 ml/min/1.73m2    GFR est non-AA >60 >60 ml/min/1.73m2    Calcium 7.9 (L) 8.5 - 10.1 MG/DL    Bilirubin, total 12.1 (H) 0.2 - 1.0 MG/DL    ALT (SGPT) 50 13 - 56 U/L    AST (SGOT) 105 (H) 10 - 38 U/L    Alk.  phosphatase 134 (H) 45 - 117 U/L    Protein, total 4.9 (L) 6.4 - 8.2 g/dL    Albumin 1.4 (L) 3.4 - 5.0 g/dL    Globulin 3.5 2.0 - 4.0 g/dL    A-G Ratio 0.4 (L) 0.8 - 1.7     TROPONIN I    Collection Time: 07/25/19 12:30 PM   Result Value Ref Range    Troponin-I, QT <0.02 0.0 - 0.045 NG/ML   PROTHROMBIN TIME + INR    Collection Time: 07/25/19 12:30 PM   Result Value Ref Range    Prothrombin time 21.4 (H) 11.5 - 15.2 sec    INR 1.9 (H) 0.8 - 1.2     PTT    Collection Time: 07/25/19 12:30 PM   Result Value Ref Range    aPTT 69.0 (H) 23.0 - 36.4 SEC   TYPE & SCREEN    Collection Time: 07/25/19 12:30 PM   Result Value Ref Range    Crossmatch Expiration 07/28/2019     ABO/Rh(D) Neita Amarjituti POSITIVE     Antibody screen NEG    NT-PRO BNP    Collection Time: 07/25/19 12:30 PM   Result Value Ref Range    NT pro- (H) 0 - 900 PG/ML   POC LACTIC ACID    Collection Time: 07/25/19  1:03 PM   Result Value Ref Range    Lactic Acid (POC) 2.82 (HH) 0.40 - 2.00 mmol/L   EKG, 12 LEAD, INITIAL    Collection Time: 07/25/19  1:15 PM   Result Value Ref Range    Ventricular Rate 97 BPM    Atrial Rate 97 BPM    P-R Interval 126 ms    QRS Duration 68 ms    Q-T Interval 334 ms    QTC Calculation (Bezet) 424 ms    Calculated P Axis 80 degrees    Calculated R Axis 61 degrees    Calculated T Axis 62 degrees    Diagnosis       Normal sinus rhythm  Nonspecific T wave abnormality  Abnormal ECG  No previous ECGs available         Radiologic Studies -   XR CHEST SNGL V   Final Result   Impression:      No radiographic evidence of acute cardiopulmonary process.  ABD LTD    (Results Pending)         Medical Decision Making   I am the first provider for this patient. I reviewed the vital signs, available nursing notes, past medical history, past surgical history, family history and social history. Vital Signs-Reviewed the patient's vital signs. EKG: Normal sinus rhythm at 97 no STEMI read by Torsten Romo [address]Cyndy    Records Reviewed: Nursing Notes and Old Medical Records (Time of Review: 1:29 PM)    ED Course: Progress Notes, Reevaluation, and Consults:       Discussed case with  and notes the patient had a bilirubin of 9 in the past week and is now dropped to 12.   That is why the patient was sent to the hospital because of this progressive liver failure. He recommends the patient get admitted with GI evaluation for biopsy consideration and the need for steroids. Patient is a low albumin and will give a small bolus of fluids however concerned she will not tolerate IV fluids very well. Carlos Jackson DO 4:16 PM    Patient has an elevated lactic acid and a low blood pressure with a markedly low albumin. I do not believe she will tolerate IV hydration very well however we will give her a 500 mL bolus I discussed at length with the patient and family about weight-based fluids and will hold weight-based fluids after risk-benefit discussion and hydrate as needed for blood pressure management. Although is no clear infection will also give an IV dose of antibiotic well continue the work-up. Carlos Jackson DO 4:21 PM    I discussed the case with Angeli Rios from the gastroenterology team and do not recommend steroids at this time but would like a right upper quadrant ultrasound to see if the biliary tree is dilated and will evaluate the patient during admission to see if further intervention is needed. Carlos Jackson DO 4:26 PM    I discussed case with Dr. Parada Morning will admit the patient for further care. Carlos Jackson DO 4:39 PM      PROVIDER SEPSIS PHYSICAL EXAM EVAL  Vital signs reviewed (see nursing documentation for further details):  Vitals:    07/25/19 1430 07/25/19 1445 07/25/19 1500 07/25/19 1530   BP: (!) 85/51 108/75 93/65 (!) 87/53   Pulse: 87 (!) 106 (!) 104 94   Resp: 11 14 13 12   Temp:       SpO2: 99% 100% 99% 98%       Cardiac exam:Regular Rate    Pulmonary exam:Normal    Peripheral pulses:Normal    Capillary refill:Normal    Skin exam:pink    Exam performed byAl Vargas MD    Has the patient/family refused IV fluids?  yes after informed discussion                  Provider Notes (Medical Decision Making):   MDM  Number of Diagnoses or Management Options  Diagnosis management comments: Patient is a 80-year-old female with a recent diagnosis of metastatic adenocarcinoma of the pancreas status post Whipple procedure in completion of chemotherapy presents after being sent by Dr. Chinyere Dai for further evaluation of abnormal labs. It appears the patient has hyperbilirubinemia diagnosed as internal for general without obstructive cause of her jaundice. Thought if it is related to her medications and when to follow-up with Dr. Chinyere Dai and was sent to the emergency department. Is not clear what the lab values were triggered the evaluation however patient has a borderline blood pressure, hypokalemia, and anasarca. Will trend her labs and hold hydration for now, discussed the case with hematology oncology, follow UA and chest x-ray, replete potassium, and then reevaluate. Procedures    Critical Care Time: Critical Care Time:  The services I provided to this patient were to treat and/or prevent clinically significant deterioration that could result in the failure of one or more body systems and/or organ systems due to liver failure, low BP. Services included the following:  -reviewing nursing notes and old charts  -vital sign assessments  -direct patient care  -medication orders and management  -interpreting and reviewing diagnostic studies/labs  -re-evaluations  -documentation time    Aggregate critical care time was 40 minutes, which includes only time during which I was engaged in work directly related to the patient's care as described above, whether I was at bedside or elsewhere in the Emergency Department. It did not include time spent performing other reported procedures or the services of residents, students, nurses, or advance practice providers. Amadeo Dinh DO 4:38 PM          Diagnosis     Clinical Impression:   1. Hyperbilirubinemia    2. Malaise and fatigue    3. Malignant neoplasm of pancreas, unspecified location of malignancy ([address]    4.  Liver failure without hepatic coma, unspecified chronicity (HCC)    5. Jaundice        Disposition: Admit     Follow-up Information    None          Patient's Medications   Start Taking    No medications on file   Continue Taking    BUSPIRONE (BUSPAR) 7.5 MG TABLET    TAKE 1 TABLET BY MOUTH THREE TIMES A DAY    CHOLECALCIFEROL (VITAMIN D3) 1,000 UNIT TABLET    Take 5,000 Units by mouth daily. JARDIANCE 10 MG TABLET    Take 1 Tab by mouth daily. LOPERAMIDE (IMMODIUM) 2 MG TABLET    Take 1 Tab by mouth four (4) times daily as needed for Diarrhea for up to 10 days. METOCLOPRAMIDE HCL (REGLAN) 10 MG TABLET    Take 5 mg by mouth Before breakfast, lunch, dinner and at bedtime. OMEPRAZOLE DELAYED RELEASE (PRILOSEC D/R) 20 MG TABLET    Take 2 Tabs by mouth daily. POTASSIUM CHLORIDE SR (KLOR-CON 10) 10 MEQ TABLET    Take  by mouth. These Medications have changed    No medications on file   Stop Taking    ERGOCALCIFEROL (ERGOCALCIFEROL) 50,000 UNIT CAPSULE    TAKE 1 CAP BY MOUTH EVERY WEEK    HYDROCODONE-ACETAMINOPHEN (NORCO) 5-325 MG PER TABLET    Take  by mouth. INSULIN NEEDLES, DISPOSABLE, 31 GAUGE X 5/16\" NDLE    Use to administer insulin daily    POLYETHYLENE GLYCOL (MIRALAX) 17 GRAM PACKET    Take 1 Packet by mouth daily as needed (constipation). THIAMINE HCL (VITAMIN B-1) 100 MG TABLET    Take  by mouth daily. Disclaimer: Sections of this note are dictated using utilizing voice recognition software. Minor typographical errors may be present. If questions arise, please do not hesitate to contact me or call our department.

## 2019-07-25 NOTE — ROUTINE PROCESS
TRANSFER - IN REPORT:    Verbal report received from [address] (name) on Marine Hutton  being received from [facility] (unit) for routine progression of care      Report consisted of patients Situation, Background, Assessment and   Recommendations(SBAR). Information from the following report(s) ED Summary was reviewed with the receiving nurse. Opportunity for questions and clarification was provided. Assessment completed upon patients arrival to unit and care assumed.

## 2019-07-26 PROBLEM — E11.9 TYPE 2 DIABETES MELLITUS WITHOUT COMPLICATION, WITHOUT LONG-TERM CURRENT USE OF INSULIN (HCC): Status: ACTIVE | Noted: 2017-08-15

## 2019-07-26 PROBLEM — E88.09 HYPOALBUMINEMIA DUE TO PROTEIN-CALORIE MALNUTRITION (HCC): Status: ACTIVE | Noted: 2019-01-01

## 2019-07-26 PROBLEM — C25.9 PANCREATIC ADENOCARCINOMA (HCC): Chronic | Status: ACTIVE | Noted: 2018-01-01

## 2019-07-26 PROBLEM — E46 HYPOALBUMINEMIA DUE TO PROTEIN-CALORIE MALNUTRITION (HCC): Status: ACTIVE | Noted: 2019-01-01

## 2019-07-26 PROBLEM — K83.1 OBSTRUCTIVE HYPERBILIRUBINEMIA: Status: ACTIVE | Noted: 2019-01-01

## 2019-07-26 PROBLEM — K83.1 OBSTRUCTIVE HYPERBILIRUBINEMIA: Chronic | Status: ACTIVE | Noted: 2019-01-01

## 2019-07-26 PROBLEM — C25.9 PANCREATIC ADENOCARCINOMA (HCC): Status: ACTIVE | Noted: 2018-01-01

## 2019-07-26 NOTE — PROGRESS NOTES
conducted an initial consultation and Spiritual Assessment for Manuel Zhao, who is a 61 y.o.,female. Patients Primary Language is: [place]. According to the patients EMR Sikhism Affiliation is: Luci Haley. The reason the Patient came to the hospital is:   Patient Active Problem List    Diagnosis Date Noted    Hypoalbuminemia due to protein-calorie malnutrition ([facility] 07/26/2019    Hypokalemia 07/25/2019    Jaundice 07/25/2019    Obstructive hyperbilirubinemia 07/04/2019    Pancreatic adenocarcinoma ([facility] 11/19/2018    Type 2 diabetes mellitus without complication, without long-term current use of insulin ([facility] 08/15/2017    Essential hypertension 06/08/2009        The  provided the following Interventions:  Initiated a relationship of care and support. Explored issues of izabella, belief, spirituality and Catholic/ritual needs while hospitalized. Listened empathically. Offered prayer and assurance of continued prayers on patient's behalf. Chart reviewed. The following outcomes where achieved:  Patient shared limited information about both their medical narrative and spiritual journey/beliefs. Patient processed feeling about current hospitalization. Patient expressed gratitude for 's visit. Assessment:  Patient does not have any Catholic/cultural needs that will affect patients preferences in health care. There are no spiritual or Catholic issues which require intervention at this time. Plan:  Chaplains will continue to follow and will provide pastoral care on an as needed/requested basis.  recommends bedside caregivers page  on duty if patient shows signs of acute spiritual or emotional distress.     [id number] [facility]   [phone]

## 2019-07-26 NOTE — PROGRESS NOTES
NUTRITION    Nutrition Screen      RECOMMENDATIONS / PLAN:     - Monitor GI symptoms and readiness for diet initiation.   - Continue RD inpatient monitoring and evaluation. NUTRITION INTERVENTIONS & DIAGNOSIS:     [x] Meals/snacks: modify composition; NPO    Nutrition Diagnosis:   Unintended weight loss related to predicted prolonged inadequate oral intake as evidenced by net wt loss of 56 lb, 29% x 3 years PTA. Inadequate energy intake related to inability to tolerate po as evidenced by pt NPO for planned procedure. ASSESSMENT:     Pt asleep/ lethargic at time of visit. Currently NPO for planned MRCP today. Average po intake adequate to meet patients estimated nutritional needs:   [] Yes     [x] No   [] Unable to determine at this time    Diet: No diet orders on file      Food Allergies:  None known   Current Appetite:   [] Good     [] Fair     [] Poor     [x] Other:  NPO  Appetite/meal intake prior to admission:   [] Good     [] Fair     [] Poor     [x] Other:  Unknown   Feeding Limitations:  [] Swallowing difficulty    [] Chewing difficulty    [] Other:  Current Meal Intake: No data found.     BM: PTA; having diarrhea per GI note today  Skin Integrity:  No pressure injury or wound noted; jaundiced  Edema:   [] No     [x] Yes   Pertinent Medications: Reviewed: lasix, SSI, protonix    Recent Labs     07/26/19  0935 07/25/19  1230    138   K 3.6 2.7*    103   CO2 26 25   * 92   BUN 5* 5*   CREA 0.71 0.78   CA 8.0* 7.9*   MG 1.7  --    PHOS 2.0*  --    ALB 1.9* 1.4*   SGOT 81* 105*   ALT 38 50       Intake/Output Summary (Last 24 hours) at 7/26/2019 1428  Last data filed at 7/26/2019 0648  Gross per 24 hour   Intake 1350 ml   Output 400 ml   Net 950 ml       Anthropometrics:  Ht Readings from Last 1 Encounters:   07/25/19 4' 5\" (1.346 m)     Last 3 Recorded Weights in this Encounter    07/25/19 1141 07/25/19 2126 07/26/19 0405   Weight: 59 kg (130 lb) 62.4 kg (137 lb 8 oz) 62.1 kg (137 lb)     Body mass index is 34.29 kg/m². Obese, Class I    Weight History:   Noted weight loss, then weight gain during past few years; net wt loss of 56 lb, 29% x 3 years PTA per chart hx. Pt reported experiencing unplanned wt loss PTA per nursing screen    Weight Metrics 7/26/2019 7/17/2019 5/6/2019 12/24/2018 12/3/2018 9/27/2016   Weight 137 lb 130 lb 116 lb 12.8 oz 116 lb 115 lb 193 lb   BMI 34.29 kg/m2 25.39 kg/m2 22.81 kg/m2 22.65 kg/m2 22.46 kg/m2 37.69 kg/m2        Admitting Diagnosis: Hypokalemia [E87.6]  Jaundice [R17]  Pertinent PMHx:  Pancreatic cancer, DM, HTN    Education Needs:        [x] None identified  [] Identified - Not appropriate at this time  []  Identified and addressed - refer to education log  Learning Limitations:   [x] None identified  [] Identified    Cultural, Jew & ethnic food preferences:  [x] None identified    [] Identified and addressed     ESTIMATED NUTRITION NEEDS:     Calories: 3694-9085 kcal (HBEx1.2-1.3) based on  [x] Actual BW: 62 kg      [] IBW   Protein: 62-74 gm (1-1.2 gm/kg) based on  [x] Actual BW      [] IBW   Fluid: 1 mL/kcal     MONITORING & EVALUATION:     Nutrition Goal(s):   1. Po intake of meals will meet >75% of patient estimated nutritional needs within the next 7 days.   Outcome:  [] Met/Ongoing    [] Progressing towards goal    [] Not progressing towards goal    [x] New/Initial goal     Monitoring:   [x] Food and beverage intake   [x] Diet order   [x] Nutrition-focused physical findings   [x] Treatment/therapy   [] Weight   [] Enteral nutrition intake        Previous Recommendations (for follow-up assessments only):     []   Implemented       []   Not Implemented (RD to address)      [] No Longer Appropriate     [] No Recommendation Made     Discharge Planning:  Pending ability to tolerate po;  Diabetic, cardiac diet  [x] Participated in care planning, discharge planning, & interdisciplinary rounds as appropriate      Rhona Wilson RD   Pager: [phone]

## 2019-07-26 NOTE — H&P
History & Physical    Patient: Selam Gaytan MRN: [id number]  CSN: [id number]    YOB: 1955  Age: 61 y.o. Sex: female      DOA: 7/25/2019       HPI:     Selam Gaytan is a 61 y.o. female with the medical history below. She was at follow up appointment with Dr Sergio Simmons and advised to go to [facility] ED for abnormal lab values. In the ER she was jaundiced and globally weak with pancytopenia, elevated bilirubin 12.1 with elevated LFT's, K+ 2.7, albumin 1.4, Ca++ 7.9 and lactic acid 2.82. EKG was NSR 97. She was hypotensive 87/53. Discussion in ED with GI and patient admitted to telemetry at [facility]. Past Medical History:   Diagnosis Date    Asthma     Cancer ([facility] [address] 11/13/2018    Pancreatic cancer    Diabetes ([facility] [address]     Hypertension     Ill-defined condition     \"nerve problem\"    Pancreatic adenocarcinoma ([facility] [address]     Syphilis        Past Surgical History:   Procedure Laterality Date    BREAST SURGERY PROCEDURE UNLISTED      unsure of side-benign, lumpectomy    COLONOSCOPY N/A 9/27/2016    COLONOSCOPY with polypectomy. performed by Tangela Gray MD at [facility] GYN      patient unsure of surgery       History reviewed. No pertinent family history. Social History     Socioeconomic History    Marital status: SINGLE     Spouse name: Not on file    Number of children: Not on file    Years of education: Not on file    Highest education level: Not on file   Tobacco Use    Smoking status: Never Smoker    Smokeless tobacco: Never Used   Substance and Sexual Activity    Alcohol use: No     Frequency: Never    Drug use: No   Social History Narrative    ** Merged History Encounter **            Prior to Admission medications    Medication Sig Start Date End Date Taking? Authorizing Provider   cholecalciferol (VITAMIN D3) 1,000 unit tablet Take 5,000 Units by mouth daily. Yes Other, MD Louise   potassium chloride SR (KLOR-CON 10) 10 mEq tablet Take  by mouth.    Yes Provider, Historical   loperamide (IMMODIUM) 2 mg tablet Take 1 Tab by mouth four (4) times daily as needed for Diarrhea for up to 10 days. 7/17/19 7/27/19 Yes Claire Cameron MD   busPIRone (BUSPAR) 7.5 mg tablet TAKE 1 TABLET BY MOUTH THREE TIMES A DAY 6/12/19  Yes Slim Chappell MD   Omeprazole delayed release (PRILOSEC D/R) 20 mg tablet Take 2 Tabs by mouth daily. 5/6/19  Yes Slim Castro MD   JARDIANCE 10 mg tablet Take 1 Tab by mouth daily. 5/6/19  Yes Slim Castro MD   metoclopramide HCl (REGLAN) 10 mg tablet Take 5 mg by mouth Before breakfast, lunch, dinner and at bedtime. Provider, Historical       No Known Allergies    Review of Systems:   Patient in treatment for pancreatic cancer and has become jaundiced with hypotension, abnormal labs and sent to ED      Physical Exam:      Visit Vitals  BP (!) 86/62 (BP 1 Location: Left arm, BP Patient Position: At rest)   Pulse 100   Temp 97 °F (36.1 °C)   Resp 18   Ht 4' 5\" (1.346 m)   Wt 62.4 kg (137 lb 8 oz)   SpO2 98%   BMI 34.42 kg/m²       Physical Exam:  GENERAL: fatigued, cooperative, no distress, icteric, cachectic  HEENT alert, oriented, icteric, mucous membranes dry, neck veins flat  Chest HR reg tachy 100, lungs decreased bases , clear  Abdomen distended, BS+, non tender  Extremities: third spacing, diffuse edema, + pt pulses, warm      Lab/Data Review:  Labs: Results:       Chemistry Recent Labs     07/25/19  1230   GLU 92      K 2.7*      CO2 25   BUN 5*   CREA 0.78   CA 7.9*   AGAP 10   BUCR 6*   *   TP 4.9*   ALB 1.4*   GLOB 3.5   AGRAT 0.4*      CBC w/Diff Recent Labs     07/25/19  1230   WBC 3.3*   RBC 3.58*   HGB 13.3   HCT 37.1      GRANS 76*   LYMPH 10*   EOS 1      Coagulation Recent Labs     07/25/19  1230   PTP 21.4*   INR 1.9*   APTT 69.0*       Iron/Ferritin No results for input(s): IRON in the last 72 hours.     No lab exists for component: TIBCCALC   BNP No results for input(s): BNPP in the last 72 hours. Cardiac Enzymes No results for input(s): CPK, CKND1, SANDIE in the last 72 hours. No lab exists for component: CKRMB, TROIP   Liver Enzymes Recent Labs     07/25/19  1230   TP 4.9*   ALB 1.4*   *   SGOT 105*      Thyroid Studies No results found for: T4, T3U, TSH, TSHEXT       All Micro Results     Procedure Component Value Units Date/Time    CULTURE, BLOOD [[id number]] Collected:  07/25/19 1230    Order Status:  Completed Specimen:  Blood Updated:  07/25/19 1508          Imaging Reviewed:  US abdomen 7/25/2019   FINDINGS:   Ltd. Exam secondary to patient's body habitus and inability to breath-hold. Portions of the abdominal aorta are visualized and are unremarkable. The head and body of the pancreas are grossly normal in appearance. There is no  ductal dilatation. The tail is not well visualized due to bowel gas.     The liver is mildly enlarged with coarsened echotexture. Small amount of  surrounding ascites. There is no biliary ductal dilatation. The main portal  vein is patent with proper directional flow. The gallbladder is extremely  difficult to visualize. The common bile duct measures approximately 0.5 cm in  greatest diameter.       The right kidney measures 3.5 x 4.7 x 3.6 cm  is without mass or evidence for  obstruction. No renal calculi are seen. No free fluid is identified.      IMPRESSION  IMPRESSION:     Hepatomegaly with coarsened echogenicity. This is nonspecific. No biliary dilatation demonstrated. Inability to visualize the gallbladder adequately. Limited visualization of the pancreas.    Report provided to the emergency department at 2007 hrs      Assessment:   Principal Problem:    Obstructive hyperbilirubinemia (7/4/2019)    Active Problems:    Hypokalemia (7/25/2019)      Jaundice (7/25/2019)      Pancreatic adenocarcinoma ([facility] [address] (11/19/2018)      Hypoalbuminemia due to protein-calorie malnutrition ([address] (7/26/2019)       Hypotension      Plan:   Admit to telemetry  GI consulted from ED   replace lytes  Gentle parenteral volume with albumin overnight   LFT's and lytes in am    Full code      Mariela Paul DO  7/25/2019, 10:24 PM

## 2019-07-26 NOTE — PROGRESS NOTES
Patient was sent to ER by the hematology and oncology [facility] service:  - In 11/2018, she was diagnosed with T2N0Mx pancreatic adenocarcinoma, she received neoadjuvant chemotherapy Duplin + Abraxane. On 4/8/19: she underwent Whipple procedure, was found to have residual disease ypT2, ypN1. Subsequently, she received Adjuvant chemoradiation therapy beginning 5/28/19. Completed 7/1/19.   - The T bilirubin has gradually increased from 06/17/19, 2->3.3->7   - She was admitted at [facility] 6/2019. Imaging including MRCP negative for obstruction. Cholestasis, presumably due to chemotherapy. - She was sent to ER yesterday for rising bilirubin, increase in INR, low albumin- concerns for liver failure. Patient will be seen in AM. I recommend an evaluation by GI, monitor counts, lytes and LFTs.  DVT prophylaxis as per primary team.

## 2019-07-26 NOTE — PROGRESS NOTES
MRI Screening form needs to be filled out and faxed to [phone] MRI can be scheduled. If unable to obtain information from pt, [facility] needs to be contacted.  If pt is claustro or will need pain meds, please have ordered in advance in order to facilitate exam.

## 2019-07-26 NOTE — PROGRESS NOTES
Problem: Pressure Injury - Risk of  Goal: *Prevention of pressure injury  Description  Document Tyson Scale and appropriate interventions in the flowsheet. Outcome: Progressing Towards Goal  Note:   Pressure Injury Interventions:  Sensory Interventions: Assess changes in LOC, Assess need for specialty bed, Avoid rigorous massage over bony prominences, Check visual cues for pain, Discuss PT/OT consult with provider, Float heels, Keep linens dry and wrinkle-free, Maintain/enhance activity level, Minimize linen layers, Monitor skin under medical devices, Pressure redistribution bed/mattress (bed type), Turn and reposition approx. every two hours (pillows and wedges if needed)    Moisture Interventions: Absorbent underpads, Assess need for specialty bed, Apply protective barrier, creams and emollients, Check for incontinence Q2 hours and as needed, Internal/External urinary devices, Limit adult briefs, Maintain skin hydration (lotion/cream), Minimize layers, Moisture barrier    Activity Interventions: Assess need for specialty bed, Increase time out of bed, Pressure redistribution bed/mattress(bed type), PT/OT evaluation    Mobility Interventions: Assess need for specialty bed, Float heels, HOB 30 degrees or less, Pressure redistribution bed/mattress (bed type), PT/OT evaluation, Turn and reposition approx.  every two hours(pillow and wedges)    Nutrition Interventions: Document food/fluid/supplement intake, Discuss nutritional consult with provider, Offer support with meals,snacks and hydration    Friction and Shear Interventions: Apply protective barrier, creams and emollients, Foam dressings/transparent film/skin sealants, HOB 30 degrees or less, Lift team/patient mobility team, Minimize layers                Problem: Patient Education: Go to Patient Education Activity  Goal: Patient/Family Education  Outcome: Progressing Towards Goal     Problem: Falls - Risk of  Goal: *Absence of Falls  Description  Document Bassam Fall Risk and appropriate interventions in the flowsheet.   Outcome: Progressing Towards Goal  Note:   Fall Risk Interventions:       Mentation Interventions: Adequate sleep, hydration, pain control, Bed/chair exit alarm, Door open when patient unattended, Eyeglasses and hearing aids, Familiar objects from home, Increase mobility, More frequent rounding, Reorient patient, Room close to nurse's station, Toileting rounds, Update white board    Medication Interventions: Bed/chair exit alarm, Evaluate medications/consider consulting pharmacy, Patient to call before getting OOB, Teach patient to arise slowly    Elimination Interventions: Bed/chair exit alarm, Call light in reach, Patient to call for help with toileting needs, Stay With Me (per policy), Toilet paper/wipes in reach, Toileting schedule/hourly rounds    History of Falls Interventions: Bed/chair exit alarm, Consult care management for discharge planning, Door open when patient unattended, Utilize gait belt for transfer/ambulation, Evaluate medications/consider consulting pharmacy, Vital signs minimum Q4HRs X 24 hrs (comment for end date)         Problem: Patient Education: Go to Patient Education Activity  Goal: Patient/Family Education  Outcome: Progressing Towards Goal     Problem: Pain  Goal: *Control of Pain  Outcome: Progressing Towards Goal     Problem: General Infection Care Plan (Adult and Pediatric)  Goal: Improvement in signs and symptoms of infection  Outcome: Progressing Towards Goal

## 2019-07-26 NOTE — PROGRESS NOTES
[facility] Hospitalist Group  Progress Note    Patient: Ariana Patiño Age: 61 y.o. : 1955 MR#: [id number] SSN: [id number]  Date/Time: 2019     Subjective:     Pt admitted to hosp for elevated Bilirubin & obstructive jaundice in the setting of H/o Pancreatic carcinoma         Assessment/Plan:     1. Obstructive jaundice with hyperbilirubinemia - GI consulted , plan for MRCP - monitor LFT's   2. H/o Pancreatic cancer - start creon when able to tolerate - Oncology on board    3. Chronic anemia - monitor H&H    4. Mild elevation in LFT's - monitor   DVT px - elevated INR   FC              Case discussed with:  [x]Patient  []Family  []Nursing  []Case Management  DVT Prophylaxis:  []Lovenox  []Hep SQ  []SCDs  []Coumadin   []On Heparin gtt    Objective:   VS:   Visit Vitals  /71 (BP 1 Location: Right arm, BP Patient Position: At rest)   Pulse 93   Temp 97.9 °F (36.6 °C)   Resp 18   Ht 4' 5\" (1.346 m) Comment: per patient   Wt 62.1 kg (137 lb)   SpO2 97%   Breastfeeding? No   BMI 34.29 kg/m²      Tmax/24hrs: Temp (24hrs), Av.7 °F (36.5 °C), Min:97 °F (36.1 °C), Max:98.4 °F (36.9 °C)  IOBRIEF    Intake/Output Summary (Last 24 hours) at 2019 1655  Last data filed at 2019 1511  Gross per 24 hour   Intake 1350 ml   Output 600 ml   Net 750 ml       General:  Alert, cooperative, no acute distress    HEENT: PERRLA, icteric sclera & skin . Pulmonary:  CTA Bilaterally. No Wheezing/Rhonchi/Rales. Cardiovascular: Regular rate and Rhythm. GI:  Soft, Non distended, Non tender. + Bowel sounds. Extremities:  No edema, cyanosis, clubbing. No calf tenderness. Neurologic: Alert and oriented X 3. No acute neuro deficits.   Additional:    Medications:   Current Facility-Administered Medications   Medication Dose Route Frequency    cefepime (MAXIPIME) 2 g in sterile water (preservative free) 10 mL IV syringe  2 g IntraVENous Q12H    sodium chloride (NS) flush 5-10 mL  5-10 mL IntraVENous PRN    busPIRone (BUSPAR) tablet 10 mg  10 mg Oral TID    pantoprazole (PROTONIX) 40 mg in sodium chloride 0.9% 10 mL injection  40 mg IntraVENous Q12H    insulin lispro (HUMALOG) injection   SubCUTAneous AC&HS    glucose chewable tablet 16 g  4 Tab Oral PRN    glucagon (GLUCAGEN) injection 1 mg  1 mg IntraMUSCular PRN    dextrose 10% infusion 125-250 mL  125-250 mL IntraVENous PRN    furosemide (LASIX) injection 40 mg  40 mg IntraVENous PRN       Labs:    Recent Results (from the past 24 hour(s))   AMMONIA    Collection Time: 07/25/19  5:15 PM   Result Value Ref Range    Ammonia 48 (H) 11 - 32 UMOL/L   POC LACTIC ACID    Collection Time: 07/25/19  5:18 PM   Result Value Ref Range    Lactic Acid (POC) 1.52 0.40 - 2.00 mmol/L   GLUCOSE, POC    Collection Time: 07/25/19  9:22 PM   Result Value Ref Range    Glucose (POC) 64 (L) 70 - 110 mg/dL   GLUCOSE, POC    Collection Time: 07/25/19  9:37 PM   Result Value Ref Range    Glucose (POC) 71 70 - 110 mg/dL   GLUCOSE, POC    Collection Time: 07/25/19  9:51 PM   Result Value Ref Range    Glucose (POC) 67 (L) 70 - 110 mg/dL   GLUCOSE, POC    Collection Time: 07/25/19  9:52 PM   Result Value Ref Range    Glucose (POC) 76 70 - 110 mg/dL   GLUCOSE, POC    Collection Time: 07/25/19 10:12 PM   Result Value Ref Range    Glucose (POC) 77 70 - 110 mg/dL   GLUCOSE, POC    Collection Time: 07/25/19 10:13 PM   Result Value Ref Range    Glucose (POC) 91 70 - 110 mg/dL   URINALYSIS W/ RFLX MICROSCOPIC    Collection Time: 07/26/19  6:25 AM   Result Value Ref Range    Color BROWN      Appearance CLOUDY      Specific gravity 1.020 1.003 - 1.030      pH (UA) 6.0 5.0 - 8.0      Protein 30 (A) NEG mg/dL    Glucose 500 (A) NEG mg/dL    Ketone TRACE (A) NEG mg/dL    Bilirubin LARGE (A) NEG      Blood TRACE (A) NEG      Urobilinogen 1.0 0.2 - 1.0 EU/dL    Nitrites NEGATIVE  NEG      Leukocyte Esterase NEGATIVE  NEG     URINE MICROSCOPIC ONLY    Collection Time: 07/26/19  6:25 AM   Result Value Ref Range    WBC 1 to 2 0 - 4 /hpf    RBC 0 to 2 0 - 5 /hpf    Epithelial cells 2+ 0 - 5 /lpf    Bacteria 2+ (A) NEG /hpf    Mucus 2+ (A) NEG /lpf    Hyaline cast 1 to 2 0 - 2 /lpf    Other: BILIRUBIN CASTS, 0 TO 2    GLUCOSE, POC    Collection Time: 07/26/19  8:07 AM   Result Value Ref Range    Glucose (POC) 164 (H) 70 - 110 mg/dL   HEPATIC FUNCTION PANEL    Collection Time: 07/26/19  9:35 AM   Result Value Ref Range    Protein, total 4.8 (L) 6.4 - 8.2 g/dL    Albumin 1.9 (L) 3.4 - 5.0 g/dL    Globulin 2.9 2.0 - 4.0 g/dL    A-G Ratio 0.7 (L) 0.8 - 1.7      Bilirubin, total 12.1 (H) 0.2 - 1.0 MG/DL    Bilirubin, direct 9.5 (H) 0.0 - 0.2 MG/DL    Alk.  phosphatase 109 45 - 117 U/L    AST (SGOT) 81 (H) 10 - 38 U/L    ALT (SGPT) 38 13 - 56 U/L   METABOLIC PANEL, BASIC    Collection Time: 07/26/19  9:35 AM   Result Value Ref Range    Sodium 138 136 - 145 mmol/L    Potassium 3.6 3.5 - 5.5 mmol/L    Chloride 107 100 - 111 mmol/L    CO2 26 21 - 32 mmol/L    Anion gap 5 3.0 - 18 mmol/L    Glucose 151 (H) 74 - 99 mg/dL    BUN 5 (L) 7.0 - 18 MG/DL    Creatinine 0.71 0.6 - 1.3 MG/DL    BUN/Creatinine ratio 7 (L) 12 - 20      GFR est AA >60 >60 ml/min/1.73m2    GFR est non-AA >60 >60 ml/min/1.73m2    Calcium 8.0 (L) 8.5 - 10.1 MG/DL   MAGNESIUM    Collection Time: 07/26/19  9:35 AM   Result Value Ref Range    Magnesium 1.7 1.6 - 2.6 mg/dL   PHOSPHORUS    Collection Time: 07/26/19  9:35 AM   Result Value Ref Range    Phosphorus 2.0 (L) 2.5 - 4.9 MG/DL   CBC WITH AUTOMATED DIFF    Collection Time: 07/26/19  9:35 AM   Result Value Ref Range    WBC 2.7 (L) 4.6 - 13.2 K/uL    RBC 2.21 (L) 4.20 - 5.30 M/uL    HGB 8.0 (L) 12.0 - 16.0 g/dL    HCT 22.9 (L) 35.0 - 45.0 %    .6 (H) 74.0 - 97.0 FL    MCH 36.2 (H) 24.0 - 34.0 PG    MCHC 34.9 31.0 - 37.0 g/dL    RDW 17.3 (H) 11.6 - 14.5 %    PLATELET 242 919 - 241 K/uL    MPV 9.8 9.2 - 11.8 FL    NEUTROPHILS 64 40 - 73 %    LYMPHOCYTES 24 21 - 52 %    MONOCYTES 9 3 - 10 %    EOSINOPHILS 3 0 - 5 %    BASOPHILS 0 0 - 2 %    ABS. NEUTROPHILS 1.7 (L) 1.8 - 8.0 K/UL    ABS. LYMPHOCYTES 0.6 (L) 0.9 - 3.6 K/UL    ABS. MONOCYTES 0.2 0.05 - 1.2 K/UL    ABS. EOSINOPHILS 0.1 0.0 - 0.4 K/UL    ABS.  BASOPHILS 0.0 0.0 - 0.1 K/UL    DF AUTOMATED     PTT    Collection Time: 07/26/19  9:35 AM   Result Value Ref Range    aPTT 49.2 (H) 23.0 - 36.4 SEC   PROTHROMBIN TIME + INR    Collection Time: 07/26/19  9:35 AM   Result Value Ref Range    Prothrombin time 23.0 (H) 11.5 - 15.2 sec    INR 2.0 (H) 0.8 - 1.2     AMMONIA    Collection Time: 07/26/19  9:35 AM   Result Value Ref Range    Ammonia <10 (L) 11 - 32 UMOL/L   GLUCOSE, POC    Collection Time: 07/26/19 11:58 AM   Result Value Ref Range    Glucose (POC) 200 (H) 70 - 110 mg/dL       Signed By: Marcos Cochran MD     July 26, 2019

## 2019-07-26 NOTE — ROUTINE PROCESS
2120 Received patient from [facility]. No distress noted. Alert and oriented with delayed responses. Call bell within reach, siderails up x 3, bed in lowest position, and patient instructed to use call bell for assistance. Will continue to monitor. Bed alarm activated. BS 64. Orange juice and yomaira crackers given. 2137 BS 71    2213 BS 91    2310 Dr. Janene Boas at bedside. New orders received. 2346 Albumin 5% hung at this time. Infusing at a rate of 60 ml/hr over 8 hours. Will continue to monitor. Order was changed. Patient will receive 1 more dose at 0800.    0327 Bladder scan amount 23 ml.    7511 Urine specimen collected via straight cath. See flow sheet.      3561 Bedside and Verbal shift change report given to Valeria Ramon [phone] (oncoming nurse) by Red Gilford, RN  (offgoing nurse). Report included the following information Kardex, Intake/Output, MAR, Recent Results and Cardiac Rhythm SR/ST tele box# [id number].

## 2019-07-26 NOTE — PROGRESS NOTES
Discharge Planning:  Reason for Admission:   HYPOKALEMIA                   RRAT Score:    13                 Plan for utilizing home health:    PENDING                      Current Advanced Directive/Advance Care Plan: NOT ON FILE                         Transition of Care Plan:   Valeria Ramon 3360 Management Interventions  PCP Verified by CM: Yes(DR. LISSET US)  Mode of Transport at Discharge:  Other (see comment)(FAMILY WILL TRANSPORT)  Transition of Care Consult (CM Consult): Discharge Planning  Current Support Network: Family Lives Nearby, Own Home(DAUGHTER LIVES [facility])  Confirm Follow Up Transport: Family  Plan discussed with Pt/Family/Caregiver: Yes(PT WILL RETURN HOME AND CONTINUE WITH CHEMOTHERAPY WITH [facility] AND FAMILY SUPPORT)  Discharge Location  Discharge Placement: Home with family assistance      [address] [place]

## 2019-07-26 NOTE — CONSULTS
[email or web address]  [phone]    GASTROENTEROLOGY CONSULT      Impression:   1. Hyperbilirubinemia w/jaundice along with hypoalbuminemia, mildly elevated ALT/AST with AST>ALT, INR 1.9. Noted with elevated bili 7/5/19 at [facility]- MRCP did not show biliary dilation- AP at that time was normal and AST only mildly elevated. Profound hepatosteatosis noted on imaging along with mild ascites Unclear etiology for this- d/w Dr. Shaw Beltre- will get repeat MRCP. May need to consider liver biopsy if MRCP negative. 2. H/O pancreatic cancer- s/p Whipple at [facility] in 4/2019 followed by 5FU and XRT. 3. Diarrhea- thought to be related to pancreatic insufficiency started on creon at [facility]  4. Weakness  5. Diabetes  6. HTN  7. H/O colon polyp- last colonoscopy 2016- TA on path; previous polypectomy in 2007 with high grade dysplasia  8. Non specific colon wall thickening noted on MRCP 7/7/19  9. GERD- on PPI      Plan:     1. MRCP  2. Monitor LFTs  3. Continue PPI  4. Can start creon when taking PO  5. Electrolyte repletion per primary team  6. Medical management per primary team      Chief Complaint: jaundice, abnormal LFTs      HPI:  Greg Newberry is a 61 y.o. female who I am being asked to see in consultation for an opinion regarding the above. Patient reports she is here due to eye pain and extremity weakness. She confirms the following history. She has h/o pancreatic cancer and underwent Whipple in 4/2019 at [facility] which was followed by chemo (5FU) and XRT. She is followed by [facility]. She was seen at [facility] 7/7/19 for hyperbilirubinemia and underwent MRCP which is noted above. She had mildly elevated AST at that time but AP/ALT were normal. CA 19-9 was 217 at that time- improved from previous. She was to f/u with [facility] for outpatient liver biopsy. She was sent to ER from [facility]. 15 for rising bilirubin, increase in INR, low albumin- concerns for liver failure. She has mild generalized abdominal pain, mild nausea. No vomiting.   Has heartburn and intermittent pill dysphagia. Reports diarrhea but no hematochezia/melena. Started on creon as inpatient at [facility] but it is unclear if she is on this as an outpatient. Last colonoscopy in 2016 and noted above  No previous EGD  No family history of liver disease  No personal history of liver disease  Reports remote h/o alcohol use but none recently- over last couple of years. PMH:   Past Medical History:   Diagnosis Date    Asthma     Cancer ([facility] 11/13/2018    Pancreatic cancer    Diabetes ([facility]     Hypertension     Hypoalbuminemia due to protein-calorie malnutrition ([facility] 7/26/2019    Ill-defined condition     \"nerve problem\"    Pancreatic adenocarcinoma ([facility]     Syphilis        PSH:   Past Surgical History:   Procedure Laterality Date    BREAST SURGERY PROCEDURE UNLISTED      unsure of side-benign, lumpectomy    COLONOSCOPY N/A 9/27/2016    COLONOSCOPY with polypectomy.  performed by Taylor Woods MD at [address] [facility]      patient unsure of surgery       Social HX:   Social History     Socioeconomic History    Marital status: SINGLE     Spouse name: Not on file    Number of children: Not on file    Years of education: Not on file    Highest education level: Not on file   Occupational History    Not on file   Social Needs    Financial resource strain: Not on file    Food insecurity:     Worry: Not on file     Inability: Not on file    Transportation needs:     Medical: Not on file     Non-medical: Not on file   Tobacco Use    Smoking status: Never Smoker    Smokeless tobacco: Never Used   Substance and Sexual Activity    Alcohol use: No     Frequency: Never    Drug use: No    Sexual activity: Not on file   Lifestyle    Physical activity:     Days per week: Not on file     Minutes per session: Not on file    Stress: Not on file   Relationships    Social connections:     Talks on phone: Not on file     Gets together: Not on file     Attends [employer] service: Not on file     Active member of club or organization: Not on file     Attends meetings of clubs or organizations: Not on file     Relationship status: Not on file    Intimate partner violence:     Fear of current or ex partner: Not on file     Emotionally abused: Not on file     Physically abused: Not on file     Forced sexual activity: Not on file   Other Topics Concern    Not on file   Social History Narrative    ** Merged History Encounter **            FHX:   History reviewed. No pertinent family history. Allergy:   No Known Allergies    Patient Active Problem List   Diagnosis Code    Hypokalemia E87.6    Jaundice R17    Essential hypertension I10    Obstructive hyperbilirubinemia K83.8    Pancreatic adenocarcinoma (HCC) C25.9    Type 2 diabetes mellitus without complication, without long-term current use of insulin (HCC) E11.9    Hypoalbuminemia due to protein-calorie malnutrition (HCC) E46       Home Medications:     Medications Prior to Admission   Medication Sig    cholecalciferol (VITAMIN D3) 1,000 unit tablet Take 5,000 Units by mouth daily.  potassium chloride SR (KLOR-CON 10) 10 mEq tablet Take  by mouth.  loperamide (IMMODIUM) 2 mg tablet Take 1 Tab by mouth four (4) times daily as needed for Diarrhea for up to 10 days.  busPIRone (BUSPAR) 7.5 mg tablet TAKE 1 TABLET BY MOUTH THREE TIMES A DAY    Omeprazole delayed release (PRILOSEC D/R) 20 mg tablet Take 2 Tabs by mouth daily.  JARDIANCE 10 mg tablet Take 1 Tab by mouth daily.  metoclopramide HCl (REGLAN) 10 mg tablet Take 5 mg by mouth Before breakfast, lunch, dinner and at bedtime. Review of Systems:     Constitutional: No fevers, chills, weight loss, fatigue. Skin: No rashes, pruritis, +jaundice, ulcerations, erythema. HENT: No headaches, nosebleeds, sinus pressure, rhinorrhea, sore throat. Eyes: No visual changes, blurred vision, eye pain, photophobia, jaundice. Cardiovascular: No chest pain, heart palpitations. Respiratory: No cough, SOB, wheezing, chest discomfort, orthopnea. Gastrointestinal: See HPI   Genitourinary: No dysuria, bleeding, discharge, pyuria. Musculoskeletal: No weakness, arthralgias, wasting. Endo: No sweats. Heme: No bruising, easy bleeding. Allergies: As noted. Neurological: Cranial nerves intact. Alert and oriented. Gait not assessed. Psychiatric:  No anxiety, depression, hallucinations. Visit Vitals  /74   Pulse (!) 105   Temp 98.4 °F (36.9 °C)   Resp 16   Ht 4' 5\" (1.346 m) Comment: per patient   Wt 62.1 kg (137 lb)   SpO2 98%   Breastfeeding? No   BMI 34.29 kg/m²       Physical Assessment:     constitutional: appearance: well developed, thin habitus, no deformities, in no acute distress. skin: inspection: no rashes, ulcers, + icterus, no other lesions; no clubbing or telangiectasias. palpation: no induration or subcutaneos nodules. eyes: inspection: normal conjunctivae and lids; no jaundice pupils: normal  ENMT: mouth: normal oral mucosa,lips and gums; good dentition. oropharynx: normal tongue, hard and soft palate; posterior pharynx without erithema, exudate or lesions. neck: thyroid: normal size, consistency and position; no masses or tenderness. respiratory: effort: normal chest excursion; no intercostal retraction or accessory muscle use. cardiovascular: abdominal aorta: normal size and position; no bruits. palpation: PMI of normal size and position; normal rhythm; no thrill or murmurs. abdominal: abdomen: normal consistency; mild generalized tenderness, no masses. hernias: no hernias appreciated. liver: normal size and consistency. spleen: not palpable. Healed midline incision  rectal: hemoccult/guaiac: not performed. musculoskeletal: digits and nails: no clubbing, cyanosis, petechiae or other inflammatory conditions. gait: not evaluated head and neck: normal range of motion; no pain, crepitation or contracture.  spine/ribs/pelvis: normal range of motion; no pain, deformity or contracture. neurologic: cranial nerves: II-XII normal.   psychiatric: judgement/insight: within normal limits. memory: poor historian. mood and affect: no evidence of depression, anxiety or agitation. orientation: oriented to time, space and person. Basic Metabolic Profile   Recent Labs     07/25/19  1230      K 2.7*      CO2 25   BUN 5*   GLU 92   CA 7.9*         CBC w/Diff    Recent Labs     07/25/19  1230   WBC 3.3*   RBC 3.58*   HGB 13.3   HCT 37.1   .6*   MCH 37.2*   MCHC 35.8   RDW 16.8*       Recent Labs     07/25/19  1230   GRANS 76*   LYMPH 10*   EOS 1        Hepatic Function   Recent Labs     07/25/19  1230   ALB 1.4*   TP 4.9*   TBILI 12.1*   SGOT 105*   *        Coags   Recent Labs     07/26/19  0935 07/25/19  1230   PTP 23.0* 21.4*   INR 2.0* 1.9*   APTT 49.2* 69.0*           Norma Sood NP. [facility], [address] [place] [address]  Cell: [phone]  [email or web address]

## 2019-07-27 NOTE — PROGRESS NOTES
[facility]  [address], [address]    Progress Note    Patient: Kale Roth MRN: [id number]  SSN: [id number]    YOB: 1955  Age: 61 y.o. Sex: female      Admit Date: 7/25/2019    LOS: 2 days     Chief complaint: weak     Impression:     1. Hx of pancreatic ca s/p whipple procedure 4/19 now on chemo rad therapy   2. Weakness fatigue and weight loss multifactorial   3. Jaundice and elevated liver enz   4. Diarrhea likely due to malabsorption     Plan:     1. Awaiting results of mri mrcp  2. Check c diff if diarrhea worsens       Subjective: The patient problems have included weakness and fatigue and early satiety with discomfort on eating . No n/v or bleeding this am .  No fevers.       Objective:     Vitals:    07/26/19 1547 07/26/19 1929 07/26/19 2338 07/27/19 0441   BP: 102/71 100/70 118/82 129/85   Pulse: 93 100 (!) 101 93   Resp: 18 18 18 18   Temp: 97.9 °F (36.6 °C) 97.4 °F (36.3 °C) 97.9 °F (36.6 °C) 97.2 °F (36.2 °C)   SpO2: 97% 99% 98% 99%   Weight:       Height:            Physical Exam:   GENERAL: alert, fatigued, cooperative, no distress, appears stated age, thin  ABDOMEN: distended firm non tender surgical scars noted quiet     Lab/Data Review:  BMP: No results found for: NA, K, CL, CO2, AGAP, GLU, BUN, CREA, GFRAA, GFRNA  CMP: No results found for: NA, K, CL, CO2, AGAP, GLU, BUN, CREA, GFRAA, GFRNA, CA, MG, PHOS, ALB, TBIL, TP, ALB, GLOB, AGRAT, SGOT, ALT, GPT  CBC: No results found for: WBC, HGB, HCT, PLT, HGBEXT, HCTEXT, PLTEXT  COAGS: No results found for: APTT, PTP, INR  Liver Panel: No results found for: ALB, CBIL, TBIL, TP, GLOB, AGRAT, SGOT, ASTPOC, ALTPOC, ALT, GPT, AP       Principal Problem:    Obstructive hyperbilirubinemia (7/4/2019)    Active Problems:    Hypokalemia (7/25/2019)      Jaundice (7/25/2019)      Pancreatic adenocarcinoma (HCC) (11/19/2018)      Hypoalbuminemia due to protein-calorie malnutrition (HCC) (7/26/2019)          Signed By: Berkley Gilmore MD     July 27, 2019

## 2019-07-27 NOTE — ROUTINE PROCESS
Bedside and Verbal shift change report given to Nat Cardona RN (oncoming nurse) by Rhonda Ragland RN (offgoing nurse). Report included the following information SBAR, Kardex, ED Summary, Intake/Output and Recent Results.

## 2019-07-27 NOTE — ROUTINE PROCESS
Bedside and Verbal shift change report given to Александр Kirby RN (oncoming nurse) by Monica River RN (offgoing nurse). Report included the following information SBAR, Kardex, MAR and Recent Results.     SITUATION:  Code Status: Full Code  Reason for Admission: Hypokalemia [E87.6]  Jaundice [address] day: 2  Problem List:       Hospital Problems  Date Reviewed: 7/17/2019          Codes Class Noted POA    Hypoalbuminemia due to protein-calorie malnutrition ([facility] ICD-10-CM: E46  ICD-9-CM: 263.9  7/26/2019 Yes        Hypokalemia ICD-10-CM: E87.6  ICD-9-CM: 276.8  7/25/2019 Yes        Jaundice ICD-10-CM: R17  ICD-9-CM: 782.4  7/25/2019 Yes        * (Principal) Obstructive hyperbilirubinemia (Chronic) ICD-10-CM: K83.8  ICD-9-CM: 576.8  7/4/2019 Yes        Pancreatic adenocarcinoma (HCC) (Chronic) ICD-10-CM: C25.9  ICD-9-CM: 157.9  11/19/2018 Yes              BACKGROUND:   Past Medical History:   Past Medical History:   Diagnosis Date    Asthma     Cancer ([facility] 11/13/2018    Pancreatic cancer    Diabetes ([facility]     Hypertension     Hypoalbuminemia due to protein-calorie malnutrition ([facility] 7/26/2019    Ill-defined condition     \"nerve problem\"    Pancreatic adenocarcinoma ([facility]     Syphilis       Patient taking anticoagulants no    Patient has a defibrillator: no    History of shots YES for example, flu, pneumonia, tetanus   Isolation History NO for example, MRSA, CDiff    ASSESSMENT:  Changes in Assessment Throughout Shift: NONE  Significant Changes in 24 hours (for example, RR/code, fall)  Patient has Central Line: yes Reasons if yes: Irritant/vesicant  Patient has Vasquez Cath: no Mobility Issues  PT  IV Patency  OR Checklist  Pending Tests    Last Vitals:  Vitals w/ MEWS Score (last day)     Date/Time MEWS Score Pulse Resp Temp BP Level of Consciousness SpO2    07/27/19 0441  1  93  18  97.2 °F (36.2 °C)  129/85  Alert  99 %    07/26/19 2338  2  (!) 101  18  97.9 °F (36.6 °C)  118/82  Alert  98 %    07/26/19 1929 2  100  18  97.4 °F (36.3 °C)  100/70  Alert  99 %    07/26/19 1547  1  93  18  97.9 °F (36.6 °C)  102/71  Alert  97 %    07/26/19 1200  2  96  18  97.9 °F (36.6 °C)  100/67  Alert  99 %    07/26/19 0805  2  (!) 105  16  98.4 °F (36.9 °C)  105/74  Alert  98 %    07/26/19 0405  2  (!) 102  16  97.2 °F (36.2 °C)  114/70  Alert  97 %            PAIN    Pain Assessment    Pain Intensity 1: 0 (07/27/19 0441)              Patient Stated Pain Goal: 0  Intervention effective: N/A  Time of last intervention: N/A Reassessment Completed: yes   Other actions taken for pain: Distraction    Last 3 Weights:  Last 3 Recorded Weights in this Encounter    07/25/19 1141 07/25/19 2126 07/26/19 0405   Weight: 59 kg (130 lb) 62.4 kg (137 lb 8 oz) 62.1 kg (137 lb)   Weight change:     INTAKE/OUPUT    Current Shift: 07/27 0701 - 07/27 1900  In: 329.2 [I.V.:329.2]  Out: 850 [Urine:850]    Last three shifts: 07/25 1901 - 07/27 0700  In: 2316.7 [P.O.:480; I.V.:1836.7]  Out: 1100 [Urine:1100]    RECOMMENDATIONS AND DISCHARGE PLANNING  Patient needs and requests: Assistance with ADL's    Pending tests/procedures: labs     Discharge plan for patient: Home    Discharge planning Needs or Barriers: none    Estimated Discharge Date: 7/31/2019 Posted on Whiteboard in Patients Room: no       \"HEALS\" SAFETY CHECK  A safety check occurred in the patient's room between off going nurse and oncoming nurse listed above. The safety check included the below items:    H  High Alert Medications Verify all high alert medication drips (heparin, PCA, etc.)  E  Equipment Suction is set up for ALL patients (with yanker)  Red plugs utilized for all equipment (IV pumps, etc.)  WOWs wiped down at end of shift.   Room stocked with oxygen, suction, and other unit-specific supplies  A  Alarms Bed alarm is set for fall risk patients  Ensure chair alarm is in place and activated if patient is up in a chair  L  Lines Check IV for any infiltration  Vasquez bag is empty if patient has a Vasquez   Tubing and IV bags are labeled  S  Safety  Room is clean, patient is clean, and equipment is clean. Hallways are clear from equipment besides carts. Fall bracelet on for fall risk patients  Ensure room is clear and free of clutter  Suction is set up for ALL patients (with candyker)  Hallways are clear from equipment besides carts.    Isolation precautions followed, supplies available outside room, sign posted    Valinda Landau, RN

## 2019-07-27 NOTE — PROGRESS NOTES
HEMATOLOGY AND ONCOLOGY   PROGRESS NOTE      Patient: Tito Barajas   MRN: [id number]      YOB: 1955      Admit Date: 7/25/2019    Assessment:     # Hyperbilirubinemia (7/4/2019)  # Hypokalemia (7/25/2019)  # Pancreatic adenocarcinoma ([facility] (11/19/2018)  # Hypoalbuminemia due to protein-calorie malnutrition ([facility] (7/26/2019)  # Leg edema  # Neutropenia    Plan:     - Follow up LFT and work up as per GI, ? Drug induced hepatitis. - Follow up on final MRCP report. - Repeat Coag studies.  - She has chest discomfort and palpitations, will obtain CTA chest.   - Leg US r/o DVT  - Follow up on cultures, she had a recent UTI at [facility]. ? Cefepime. - Monitor daily counts. - Level of CA 19-9 could be misleading at this time. - Add SCD. I discussed the plan with the patient and answered her questions. She expressed an understanding. Jane Gaston MD  [facility] [phone]    Subjective:     Patient is known to the hematology and oncology service. She has been managed for pancreatic adenocarcinoma. - In 11/2018, she was diagnosed with [id number] pancreatic adenocarcinoma, she received neoadjuvant chemotherapy [place] + Abraxane. On 4/8/19: she underwent Whipple procedure, was found to have residual disease ypT2, ypN1. Subsequently, she received Adjuvant chemoradiation therapy beginning 5/28/19. Completed 7/1/19.   - The T bilirubin has gradually increased from 06/17/19, 2->3.3->7   - She was admitted at [facility] 6/2019. Imaging including MRCP negative for obstruction. Cholestasis, presumably due to chemotherapy. - She was sent to ER on 07/25 for rising bilirubin, increase in INR, low albumin- concerns for liver failure.    - Patient reports fatigue, chest discomfort, palpitations, and swelling of her feet as well as right thigh. She denies fever, chills or night sweats.      Objective:     Patient Vitals for the past 24 hrs:   BP Temp Pulse Resp SpO2   07/27/19 0441 129/85 97.2 °F (36.2 °C) 93 18 99 %   19 2338 118/82 97.9 °F (36.6 °C) (!) 101 18 98 %   19 1929 100/70 97.4 °F (36.3 °C) 100 18 99 %   19 1547 102/71 97.9 °F (36.6 °C) 93 18 97 %   19 1200 100/67 97.9 °F (36.6 °C) 96 18 99 %         Intake/Output Summary (Last 24 hours) at 2019 1007  Last data filed at 2019 0659  Gross per 24 hour   Intake 1077.5 ml   Output 1550 ml   Net -472.5 ml       Temp (24hrs), Av.7 °F (36.5 °C), Min:97.2 °F (36.2 °C), Max:97.9 °F (36.6 °C)       Review Of Systems:     Constitutional: negative for fevers, chills, sweats and fatigue  Eyes: negative for visual disturbance, redness and icterus  Ears, Nose, Mouth, Throat, and Face: negative for tinnitus, epistaxis, sore mouth and hoarseness  Respiratory: negative for cough, sputum, hemoptysis, pleurisy/chest pain or wheezing. Cardiovascular: see subjective. Gastrointestinal: Jaundice, negative for reflux symptoms, nausea, vomiting, change in bowel habits, melena, diarrhea, constipation and abdominal pain. Genitourinary:negative for dysuria, nocturia, urinary incontinence  Integument: jaundice and pruritus  Hematologic/Lymphatic: negative for easy bruising, bleeding and lymphadenopathy  Musculoskeletal:negative for myalgias, arthralgias and bone pain  Neurological: negative for headaches, dizziness, seizures, paresthesia and weakness. Physical Exam:     Constitutional: Alert, oriented, not in distress  Eyes: sclera icterus, no redness  HEENT: no palpable Lymph nodes, no mucositis, no thrush. Respiratory: symmetrical expansion, no rales, no rhonchi, no wheezing. Cardiovascular: S1S2 positive. Gastrointestinal: soft, benign, non tender, no palpable mass. Integument: no rash, no petechiae, no ecchymosis. Musculoskeletal: leg and feet edema,  no cyanosis, no clubbing. Neurological: intact, symmetrical exam with no focal motor or sensory deficits.     Lab:     Recent Results (from the past 24 hour(s))   GLUCOSE, POC Collection Time: 07/26/19 11:58 AM   Result Value Ref Range    Glucose (POC) 200 (H) 70 - 110 mg/dL   GLUCOSE, POC    Collection Time: 07/26/19  9:36 PM   Result Value Ref Range    Glucose (POC) 37 (LL) 70 - 110 mg/dL   GLUCOSE, POC    Collection Time: 07/26/19 11:04 PM   Result Value Ref Range    Glucose (POC) 101 70 - 110 mg/dL

## 2019-07-27 NOTE — PROGRESS NOTES
Problem: Pressure Injury - Risk of  Goal: *Prevention of pressure injury  Description  Document Tyson Scale and appropriate interventions in the flowsheet. Outcome: Progressing Towards Goal  Note:   Pressure Injury Interventions:  Sensory Interventions: Assess changes in LOC, Float heels, Keep linens dry and wrinkle-free, Minimize linen layers    Moisture Interventions: Internal/External urinary devices, Minimize layers, Check for incontinence Q2 hours and as needed, Absorbent underpads    Activity Interventions: Assess need for specialty bed, Pressure redistribution bed/mattress(bed type)    Mobility Interventions: Assess need for specialty bed, Float heels, HOB 30 degrees or less    Nutrition Interventions: Document food/fluid/supplement intake    Friction and Shear Interventions: HOB 30 degrees or less, Lift sheet, Minimize layers                Problem: Patient Education: Go to Patient Education Activity  Goal: Patient/Family Education  Outcome: Progressing Towards Goal     Problem: Falls - Risk of  Goal: *Absence of Falls  Description  Document Bassam Fall Risk and appropriate interventions in the flowsheet.   Outcome: Progressing Towards Goal  Note:   Fall Risk Interventions:  Mobility Interventions: Bed/chair exit alarm, Patient to call before getting OOB    Mentation Interventions: Adequate sleep, hydration, pain control, Bed/chair exit alarm, More frequent rounding, Room close to nurse's station, Toileting rounds, Update white board    Medication Interventions: Bed/chair exit alarm, Patient to call before getting OOB, Teach patient to arise slowly    Elimination Interventions: Bed/chair exit alarm, Call light in reach, Patient to call for help with toileting needs, Toilet paper/wipes in reach, Toileting schedule/hourly rounds, Stay With Me (per policy)    History of Falls Interventions: Bed/chair exit alarm, Door open when patient unattended, Room close to nurse's station         Problem: Patient Education: Go to Patient Education Activity  Goal: Patient/Family Education  Outcome: Progressing Towards Goal     Problem: Pain  Goal: *Control of Pain  Outcome: Progressing Towards Goal  Goal: *PALLIATIVE CARE:  Alleviation of Pain  Outcome: Progressing Towards Goal     Problem: General Infection Care Plan (Adult and Pediatric)  Goal: Improvement in signs and symptoms of infection  Outcome: Progressing Towards Goal  Goal: *Optimize nutritional status  Outcome: Progressing Towards Goal     Problem: Nutrition Deficit  Goal: *Optimize nutritional status  Outcome: Progressing Towards Goal     Problem: Risk for Spread of Infection  Goal: Prevent transmission of infectious organism to others  Description  Prevent the transmission of infectious organisms to other patients, staff members, and visitors.   Outcome: Progressing Towards Goal     Problem: Patient Education:  Go to Education Activity  Goal: Patient/Family Education  Outcome: Progressing Towards Goal

## 2019-07-27 NOTE — PROGRESS NOTES
Blood glucose = 37 mg/dl. Patient is awake and conversant. Skin is warm and dry. Dextrose 10% 250 ml is administered. Rechecked blood glucose = 101 mg/dl. At 2320, Dr. Bianca Dasilva, on-call for Dr. Felicia Wolff, was paged to inform that MRI of Abd with MRCP was completed but patient remains NPO and no diet order in chart. No return call was received. Paged again at [address]. At [facility] [address], Dr. Paddy Smith is at the station. He was informed of hypoglycemic episode and no diet order in chart. At [address] [facility], answering service for Dr. Bianca Dasilva called to ask if Dr. Bianca Dasilva called back. No return call was received. He said that he called Dr. Bianca Dasilva directly but is not answering the call. He said he will continue to contact Dr. Bianca Dasilva. At 0055, answering service called again and connected Dr. Bianca Dasilva on the phone. Dr. Bianca Dasilva was informed of above note and an order for Clear Liquid diet was received.

## 2019-07-28 NOTE — PROGRESS NOTES
HEMATOLOGY AND ONCOLOGY   PROGRESS NOTE      Patient: Braulio Kevin   MRN: [id number]      YOB: 1955      Admit Date: 2019    Assessment:     # Hyperbilirubinemia (2019)  # Hypokalemia (2019)  # Pancreatic adenocarcinoma ([facility] (2018)  # Hypoalbuminemia due to protein-calorie malnutrition ([facility] (2019)  # Leg edema  # Neutropenia    Plan:     - Follow up LFT, T. Bilirubin is stable now. Gi is following, appreciate recommendations. - Follow up on final MRCP report. - Monitor daily counts, lytes, and coag studies.  - CTA chest and Leg US, results discussed with the patient. - Follow up on cultures, she had a recent UTI at [facility]. ? Cefepime.   - SCD. I discussed the plan with the patient and answered her questions. She expressed an understanding. Ant Souza MD  [facility] [phone]    Subjective:     - She feels better this AM, she has loose bowel movements x2-3 per day. She continues to have feet swelling, no new complaints. Objective:     Patient Vitals for the past 24 hrs:   BP Temp Pulse Resp SpO2   19 0753 104/70 97.4 °F (36.3 °C) 98 16 97 %   19 0338 104/71 97.3 °F (36.3 °C) 94 18 96 %   19 2330 101/62 97.7 °F (36.5 °C) 99 18 100 %   19 1959 103/66 97 °F (36.1 °C) 98 18 97 %         Intake/Output Summary (Last 24 hours) at 2019 0947  Last data filed at 2019 0659  Gross per 24 hour   Intake 1230 ml   Output 1200 ml   Net 30 ml       Temp (24hrs), Av.4 °F (36.3 °C), Min:97 °F (36.1 °C), Max:97.7 °F (36.5 °C)       Review Of Systems:     Constitutional: negative for fevers, chills, sweats and fatigue  Eyes: negative for visual disturbance, redness and icterus  Ears, Nose, Mouth, Throat, and Face: negative for tinnitus, epistaxis, sore mouth and hoarseness  Respiratory: negative for cough, sputum, hemoptysis, pleurisy/chest pain or wheezing. Cardiovascular: see subjective.    Gastrointestinal: Jaundice, negative for reflux symptoms, nausea, vomiting, change in bowel habits, melena, diarrhea, constipation and abdominal pain. Genitourinary:negative for dysuria, nocturia, urinary incontinence  Integument: jaundice and pruritus  Hematologic/Lymphatic: negative for easy bruising, bleeding and lymphadenopathy  Musculoskeletal:negative for myalgias, arthralgias and bone pain  Neurological: negative for headaches, dizziness, seizures, paresthesia and weakness. Physical Exam:     Constitutional: Alert, oriented, not in distress  Eyes: sclera icterus, no redness  HEENT: oral mucosa is moist and clear. Respiratory: symmetrical expansion, no rales, or wheezing. Cardiovascular: S1S2 positive. Gastrointestinal: soft, benign, non tender, mildly distended. Integument: no rash, no petechiae, no ecchymosis. Musculoskeletal: leg and feet edema, no clubbing. Neurological: intact, symmetrical exam with no focal motor or sensory deficits. Lab:     Recent Results (from the past 24 hour(s))   FIBRINOGEN    Collection Time: 07/27/19  2:25 PM   Result Value Ref Range    Fibrinogen 126 (L) 210 - 451 mg/dL   PROTHROMBIN TIME + INR    Collection Time: 07/27/19  2:25 PM   Result Value Ref Range    Prothrombin time 24.5 (H) 11.5 - 15.2 sec    INR 2.2 (H) 0.8 - 1.2     PTT    Collection Time: 07/27/19  2:25 PM   Result Value Ref Range    aPTT 48.1 (H) 23.0 - 36.4 SEC   CBC WITH AUTOMATED DIFF    Collection Time: 07/27/19  2:25 PM   Result Value Ref Range    WBC 2.5 (L) 4.6 - 13.2 K/uL    RBC 2.06 (L) 4.20 - 5.30 M/uL    HGB 7.4 (L) 12.0 - 16.0 g/dL    HCT 21.2 (L) 35.0 - 45.0 %    .9 (H) 74.0 - 97.0 FL    MCH 35.9 (H) 24.0 - 34.0 PG    MCHC 34.9 31.0 - 37.0 g/dL    RDW 17.3 (H) 11.6 - 14.5 %    PLATELET 368 705 - 115 K/uL    MPV 9.6 9.2 - 11.8 FL    NEUTROPHILS 68 40 - 73 %    LYMPHOCYTES 16 (L) 21 - 52 %    MONOCYTES 14 (H) 3 - 10 %    EOSINOPHILS 2 0 - 5 %    BASOPHILS 0 0 - 2 %    ABS.  NEUTROPHILS 1.7 (L) 1.8 - 8.0 K/UL    ABS. LYMPHOCYTES 0.4 (L) 0.9 - 3.6 K/UL    ABS. MONOCYTES 0.4 0.05 - 1.2 K/UL    ABS. EOSINOPHILS 0.1 0.0 - 0.4 K/UL    ABS. BASOPHILS 0.0 0.0 - 0.1 K/UL    DF AUTOMATED     METABOLIC PANEL, COMPREHENSIVE    Collection Time: 07/27/19  2:25 PM   Result Value Ref Range    Sodium 141 136 - 145 mmol/L    Potassium 3.2 (L) 3.5 - 5.5 mmol/L    Chloride 109 100 - 111 mmol/L    CO2 25 21 - 32 mmol/L    Anion gap 7 3.0 - 18 mmol/L    Glucose 160 (H) 74 - 99 mg/dL    BUN 4 (L) 7.0 - 18 MG/DL    Creatinine 0.59 (L) 0.6 - 1.3 MG/DL    BUN/Creatinine ratio 7 (L) 12 - 20      GFR est AA >60 >60 ml/min/1.73m2    GFR est non-AA >60 >60 ml/min/1.73m2    Calcium 8.0 (L) 8.5 - 10.1 MG/DL    Bilirubin, total 12.8 (H) 0.2 - 1.0 MG/DL    ALT (SGPT) 37 13 - 56 U/L    AST (SGOT) 74 (H) 10 - 38 U/L    Alk. phosphatase 107 45 - 117 U/L    Protein, total 5.0 (L) 6.4 - 8.2 g/dL    Albumin 2.1 (L) 3.4 - 5.0 g/dL    Globulin 2.9 2.0 - 4.0 g/dL    A-G Ratio 0.7 (L) 0.8 - 1.7     GLUCOSE, POC    Collection Time: 07/27/19  9:13 PM   Result Value Ref Range    Glucose (POC) 137 (H) 70 - 110 mg/dL   CBC WITH AUTOMATED DIFF    Collection Time: 07/28/19  3:01 AM   Result Value Ref Range    WBC 3.0 (L) 4.6 - 13.2 K/uL    RBC 2.46 (L) 4.20 - 5.30 M/uL    HGB 8.6 (L) 12.0 - 16.0 g/dL    HCT 25.7 (L) 35.0 - 45.0 %    .5 (H) 74.0 - 97.0 FL    MCH 35.0 (H) 24.0 - 34.0 PG    MCHC 33.5 31.0 - 37.0 g/dL    RDW 17.0 (H) 11.6 - 14.5 %    PLATELET 686 513 - 371 K/uL    MPV 9.6 9.2 - 11.8 FL    NEUTROPHILS 57 40 - 73 %    LYMPHOCYTES 15 (L) 21 - 52 %    MONOCYTES 24 (H) 3 - 10 %    EOSINOPHILS 3 0 - 5 %    BASOPHILS 1 0 - 2 %    ABS. NEUTROPHILS 1.7 (L) 1.8 - 8.0 K/UL    ABS. LYMPHOCYTES 0.5 (L) 0.9 - 3.6 K/UL    ABS. MONOCYTES 0.7 0.05 - 1.2 K/UL    ABS. EOSINOPHILS 0.1 0.0 - 0.4 K/UL    ABS.  BASOPHILS 0.0 0.0 - 0.1 K/UL    DF AUTOMATED     METABOLIC PANEL, COMPREHENSIVE    Collection Time: 07/28/19  3:01 AM   Result Value Ref Range    Sodium 141 136 - 145 mmol/L    Potassium 3.4 (L) 3.5 - 5.5 mmol/L    Chloride 108 100 - 111 mmol/L    CO2 25 21 - 32 mmol/L    Anion gap 8 3.0 - 18 mmol/L    Glucose 147 (H) 74 - 99 mg/dL    BUN 3 (L) 7.0 - 18 MG/DL    Creatinine 0.54 (L) 0.6 - 1.3 MG/DL    BUN/Creatinine ratio 6 (L) 12 - 20      GFR est AA >60 >60 ml/min/1.73m2    GFR est non-AA >60 >60 ml/min/1.73m2    Calcium 8.2 (L) 8.5 - 10.1 MG/DL    Bilirubin, total 12.3 (H) 0.2 - 1.0 MG/DL    ALT (SGPT) 36 13 - 56 U/L    AST (SGOT) 73 (H) 10 - 38 U/L    Alk.  phosphatase 108 45 - 117 U/L    Protein, total 5.0 (L) 6.4 - 8.2 g/dL    Albumin 1.9 (L) 3.4 - 5.0 g/dL    Globulin 3.1 2.0 - 4.0 g/dL    A-G Ratio 0.6 (L) 0.8 - 1.7     GLUCOSE, POC    Collection Time: 07/28/19  8:00 AM   Result Value Ref Range    Glucose (POC) 131 (H) 70 - 110 mg/dL

## 2019-07-28 NOTE — ROUTINE PROCESS
Bedside and Verbal shift change report given to Ema Grande RN (oncoming nurse) by Javier Garcia RN (offgoing nurse). Report included the following information SBAR, Kardex, MAR and Recent Results.     SITUATION:  Code Status: Full Code  Reason for Admission: Hypokalemia [E87.6]  Jaundice [address] day: 3  Problem List:       Hospital Problems  Date Reviewed: 7/17/2019          Codes Class Noted POA    Hypoalbuminemia due to protein-calorie malnutrition ([facility] ICD-10-CM: E46  ICD-9-CM: 263.9  7/26/2019 Yes        Hypokalemia ICD-10-CM: E87.6  ICD-9-CM: 276.8  7/25/2019 Yes        Jaundice ICD-10-CM: R17  ICD-9-CM: 782.4  7/25/2019 Yes        * (Principal) Obstructive hyperbilirubinemia (Chronic) ICD-10-CM: K83.8  ICD-9-CM: 576.8  7/4/2019 Yes        Pancreatic adenocarcinoma (HCC) (Chronic) ICD-10-CM: C25.9  ICD-9-CM: 157.9  11/19/2018 Yes              BACKGROUND:   Past Medical History:   Past Medical History:   Diagnosis Date    Asthma     Cancer ([facility] 11/13/2018    Pancreatic cancer    Diabetes ([facility]     Hypertension     Hypoalbuminemia due to protein-calorie malnutrition ([facility] 7/26/2019    Ill-defined condition     \"nerve problem\"    Pancreatic adenocarcinoma ([facility]     Syphilis       Patient taking anticoagulants no    Patient has a defibrillator: no    History of shots YES for example, flu, pneumonia, tetanus   Isolation History NO for example, MRSA, CDiff    ASSESSMENT:  Changes in Assessment Throughout Shift: NONE  Significant Changes in 24 hours (for example, RR/code, fall)  Patient has Central Line: yes Reasons if yes: Irritant/vesicant  Patient has Vasquez Cath: no Mobility Issues  PT  IV Patency  OR Checklist  Pending Tests    Last Vitals:  Vitals w/ MEWS Score (last day)     Date/Time MEWS Score Pulse Resp Temp BP Level of Consciousness SpO2    07/28/19 0338  1  94  18  97.3 °F (36.3 °C)  104/71  Alert  96 %    07/27/19 2330  1  99  18  97.7 °F (36.5 °C)  101/62  Alert  100 %    07/27/19 1959  1 98  18  97 °F (36.1 °C)  103/66  Alert  97 %    07/27/19 0441  1  93  18  97.2 °F (36.2 °C)  129/85  Alert  99 %            PAIN    Pain Assessment    Pain Intensity 1: 0 (07/28/19 0338)              Patient Stated Pain Goal: 0  Intervention effective: N/A  Time of last intervention: N/A Reassessment Completed: yes   Other actions taken for pain: Distraction    Last 3 Weights:  Last 3 Recorded Weights in this Encounter    07/25/19 1141 07/25/19 2126 07/26/19 0405   Weight: 59 kg (130 lb) 62.4 kg (137 lb 8 oz) 62.1 kg (137 lb)   Weight change:     INTAKE/OUPUT    Current Shift: No intake/output data recorded. Last three shifts: 07/26 1901 - 07/28 0700  In: 567.5 [P.O.:240; I.V.:327.5]  Out: 2550 [Urine:2550]    RECOMMENDATIONS AND DISCHARGE PLANNING  Patient needs and requests: Assistance with ADL's    Pending tests/procedures: labs     Discharge plan for patient: Home    Discharge planning Needs or Barriers: none    Estimated Discharge Date: 7/31/2019 Posted on Whiteboard in Patients Room: no       \"HEALS\" SAFETY CHECK  A safety check occurred in the patient's room between off going nurse and oncoming nurse listed above. The safety check included the below items:    H  High Alert Medications Verify all high alert medication drips (heparin, PCA, etc.)  E  Equipment Suction is set up for ALL patients (with yanker)  Red plugs utilized for all equipment (IV pumps, etc.)  WOWs wiped down at end of shift. Room stocked with oxygen, suction, and other unit-specific supplies  A  Alarms Bed alarm is set for fall risk patients  Ensure chair alarm is in place and activated if patient is up in a chair  L  Lines Check IV for any infiltration  Vasquez bag is empty if patient has a Vasquez   Tubing and IV bags are labeled  S  Safety  Room is clean, patient is clean, and equipment is clean. Hallways are clear from equipment besides carts.    Fall bracelet on for fall risk patients  Ensure room is clear and free of clutter  Suction is set up for ALL patients (with milly)  Hallways are clear from equipment besides carts.    Isolation precautions followed, supplies available outside room, sign posted    Aby Hicks RN

## 2019-07-28 NOTE — PROGRESS NOTES
Bedside and Verbal shift change report given to Trino Pearl (oncoming nurse) by Baldemar Fernandez (offgoing nurse). Report included the following information SBAR, Kardex, ED Summary, OR Summary, Procedure Summary, Intake/Output, MAR, Recent Results and Med Rec Status.

## 2019-07-28 NOTE — PROGRESS NOTES
Bedside and Verbal shift change report given to Trino Pearl (oncoming nurse) by Leonie Morfin (offgoing nurse). Report included the following information SBAR, Kardex, ED Summary, OR Summary, Procedure Summary, Intake/Output, MAR, Recent Results and Med Rec Status.

## 2019-07-28 NOTE — PROGRESS NOTES
[email or web address]  [phone]    Gastroenterology follow up-Progress note    Impression:  1. Hx of pancreatic ca s/p whipple procedure 4/19 now on chemo rad therapy   2. Weakness fatigue and weight loss - multifactorial   3. Jaundice and elevated liver enzymes, Profound hepatosteatosis noted on imaging along with mild ascites Unclear etiology for this, HCV neg in 2017 - slightly improving  4. Diarrhea likely due to malabsorption - stable    Plan:  1. Awaiting results of MRI/MRCP  2. Continue to monitor LFTs  3. Check c diff if diarrhea worsens  4. Can start creon when taking PO  5. Electrolyte repletion per primary team  6. Medical management per primary team      Chief Complaint: weakness      Subjective:  Denies nausea, vomiting, abdominal pain. Tolerating clear liquid diet. ROS: Denies any fevers, chills, rash. Cardiovascular: heart normal, normal rate and regular rhythm   Pulmonary/Chest Wall: breath sounds normal and effort normal   Abdominal: distendedl, bowel sounds normal and soft, non-acute, non-tender     Patient Active Problem List   Diagnosis Code    Hypokalemia E87.6    Jaundice R17    Essential hypertension I10    Obstructive hyperbilirubinemia K83.8    Pancreatic adenocarcinoma (HCC) C25.9    Type 2 diabetes mellitus without complication, without long-term current use of insulin (HCC) E11.9    Hypoalbuminemia due to protein-calorie malnutrition (HCC) E46         Visit Vitals  /81 (BP 1 Location: Right arm, BP Patient Position: At rest)   Pulse (!) 102   Temp 98.8 °F (37.1 °C)   Resp 20   Ht 4' 5\" (1.346 m) Comment: per patient   Wt 62.1 kg (137 lb)   SpO2 97%   Breastfeeding?  No   BMI 34.29 kg/m²           Intake/Output Summary (Last 24 hours) at 7/28/2019 1234  Last data filed at 7/28/2019 1224  Gross per 24 hour   Intake 1230 ml   Output 1700 ml   Net -470 ml       CBC w/Diff    Lab Results   Component Value Date/Time    WBC 3.0 (L) 07/28/2019 03:01 AM    RBC 2.46 (L) 07/28/2019 03:01 AM    HGB 8.6 (L) 07/28/2019 03:01 AM    HCT 25.7 (L) 07/28/2019 03:01 AM    .5 (H) 07/28/2019 03:01 AM    MCH 35.0 (H) 07/28/2019 03:01 AM    MCHC 33.5 07/28/2019 03:01 AM    RDW 17.0 (H) 07/28/2019 03:01 AM     07/28/2019 03:01 AM    Lab Results   Component Value Date/Time    GRANS 57 07/28/2019 03:01 AM    LYMPH 15 (L) 07/28/2019 03:01 AM    EOS 3 07/28/2019 03:01 AM    BASOS 1 07/28/2019 03:01 AM      Basic Metabolic Profile   Recent Labs     07/28/19  0301  07/26/19  0935      < > 138   K 3.4*   < > 3.6      < > 107   CO2 25   < > 26   BUN 3*   < > 5*   CA 8.2*   < > 8.0*   MG  --   --  1.7   PHOS  --   --  2.0*    < > = values in this interval not displayed. Hepatic Function    Lab Results   Component Value Date/Time    ALB 1.9 (L) 07/28/2019 03:01 AM    TP 5.0 (L) 07/28/2019 03:01 AM     07/28/2019 03:01 AM    Lab Results   Component Value Date/Time    SGOT 73 (H) 07/28/2019 03:01 AM          Coags   Recent Labs     07/27/19  1425 07/26/19  0935   PTP 24.5* 23.0*   INR 2.2* 2.0*   APTT 48.1* 49.2*               Sabrina Fisher NP    [facility]. [email or web address]  Phone: [phone]  Pager: [phone]

## 2019-07-28 NOTE — PROGRESS NOTES
Problem: Pressure Injury - Risk of  Goal: *Prevention of pressure injury  Description  Document Tyson Scale and appropriate interventions in the flowsheet. Outcome: Progressing Towards Goal  Note:   Pressure Injury Interventions:  Sensory Interventions: Assess changes in LOC, Assess need for specialty bed, Check visual cues for pain, Float heels, Keep linens dry and wrinkle-free, Minimize linen layers, Pressure redistribution bed/mattress (bed type)    Moisture Interventions: Absorbent underpads, Internal/External fecal devices, Minimize layers    Activity Interventions: Assess need for specialty bed, Pressure redistribution bed/mattress(bed type)    Mobility Interventions: Assess need for specialty bed, Float heels, HOB 30 degrees or less, Pressure redistribution bed/mattress (bed type)    Nutrition Interventions: Document food/fluid/supplement intake    Friction and Shear Interventions: HOB 30 degrees or less, Lift sheet, Minimize layers                Problem: Patient Education: Go to Patient Education Activity  Goal: Patient/Family Education  Outcome: Progressing Towards Goal     Problem: Falls - Risk of  Goal: *Absence of Falls  Description  Document Bassam Fall Risk and appropriate interventions in the flowsheet.   Outcome: Progressing Towards Goal  Note:   Fall Risk Interventions:  Mobility Interventions: Bed/chair exit alarm, Patient to call before getting OOB    Mentation Interventions: Adequate sleep, hydration, pain control, Bed/chair exit alarm, More frequent rounding, Toileting rounds, Update white board    Medication Interventions: Bed/chair exit alarm    Elimination Interventions: Bed/chair exit alarm, Call light in reach, Patient to call for help with toileting needs, Toilet paper/wipes in reach, Toileting schedule/hourly rounds    History of Falls Interventions: Bed/chair exit alarm         Problem: Patient Education: Go to Patient Education Activity  Goal: Patient/Family Education  Outcome: Progressing Towards Goal     Problem: Pain  Goal: *Control of Pain  Outcome: Progressing Towards Goal     Problem: General Infection Care Plan (Adult and Pediatric)  Goal: Improvement in signs and symptoms of infection  Outcome: Progressing Towards Goal  Goal: *Optimize nutritional status  Outcome: Progressing Towards Goal     Problem: Nutrition Deficit  Goal: *Optimize nutritional status  Outcome: Progressing Towards Goal     Problem: Risk for Spread of Infection  Goal: Prevent transmission of infectious organism to others  Description  Prevent the transmission of infectious organisms to other patients, staff members, and visitors.   Outcome: Progressing Towards Goal     Problem: Patient Education:  Go to Education Activity  Goal: Patient/Family Education  Outcome: Progressing Towards Goal

## 2019-07-28 NOTE — PROGRESS NOTES
[facility] Hospitalist Group  Progress Note    Patient: Swathi Cordoba Age: 61 y.o. : 1955 MR#: [id number] SSN: [id number]  Date/Time: 2019     Subjective:     Pt admitted to hosp for elevated Bilirubin & obstructive jaundice in the setting of H/o Pancreatic carcinoma         Assessment/Plan:     1. Obstructive jaundice with hyperbilirubinemia - Has had whipple's procedure done -  GI consulted , plan for MRCP - monitor LFT's   2. H/o Pancreatic cancer - start creon when able to tolerate - Oncology on board  - CTA chest cone 2y to Tachycardia - negative   3. Chronic anemia - monitor H&H    4. Mild elevation in LFT's - monitor   DVT px - elevated INR   FC              Case discussed with:  [x]Patient  []Family  []Nursing  []Case Management  DVT Prophylaxis:  []Lovenox  []Hep SQ  []SCDs  []Coumadin   []On Heparin gtt    Objective:   VS:   Visit Vitals  /66 (BP 1 Location: Right arm, BP Patient Position: At rest;Head of bed elevated (Comment degrees))   Pulse 98   Temp 97 °F (36.1 °C)   Resp 18   Ht 4' 5\" (1.346 m) Comment: per patient   Wt 62.1 kg (137 lb)   SpO2 97%   Breastfeeding? No   BMI 34.29 kg/m²      Tmax/24hrs: Temp (24hrs), Av.4 °F (36.3 °C), Min:97 °F (36.1 °C), Max:97.9 °F (36.6 °C)  IOBRIEF    Intake/Output Summary (Last 24 hours) at 2019  Last data filed at 2019  Gross per 24 hour   Intake 567.5 ml   Output 1550 ml   Net -982.5 ml       General:  Alert, cooperative, no acute distress    HEENT: PERRLA, icteric sclera & skin . Pulmonary:  CTA Bilaterally. No Wheezing/Rhonchi/Rales. Cardiovascular: Regular rate and Rhythm. GI:  Soft, Non distended, Non tender. + Bowel sounds. Extremities:  No edema, cyanosis, clubbing. No calf tenderness. Neurologic: Alert and oriented X 3. No acute neuro deficits.   Additional:    Medications:   Current Facility-Administered Medications   Medication Dose Route Frequency    dextrose 5% infusion  50 mL/hr IntraVENous CONTINUOUS    cefepime (MAXIPIME) 2 g in sterile water (preservative free) 10 mL IV syringe  2 g IntraVENous Q12H    sodium chloride (NS) flush 5-10 mL  5-10 mL IntraVENous PRN    busPIRone (BUSPAR) tablet 10 mg  10 mg Oral TID    pantoprazole (PROTONIX) 40 mg in sodium chloride 0.9% 10 mL injection  40 mg IntraVENous Q12H    insulin lispro (HUMALOG) injection   SubCUTAneous AC&HS    glucose chewable tablet 16 g  4 Tab Oral PRN    glucagon (GLUCAGEN) injection 1 mg  1 mg IntraMUSCular PRN    dextrose 10% infusion 125-250 mL  125-250 mL IntraVENous PRN    furosemide (LASIX) injection 40 mg  40 mg IntraVENous PRN       Labs:    Recent Results (from the past 24 hour(s))   GLUCOSE, POC    Collection Time: 07/26/19 11:04 PM   Result Value Ref Range    Glucose (POC) 101 70 - 110 mg/dL   FIBRINOGEN    Collection Time: 07/27/19  2:25 PM   Result Value Ref Range    Fibrinogen 126 (L) 210 - 451 mg/dL   PROTHROMBIN TIME + INR    Collection Time: 07/27/19  2:25 PM   Result Value Ref Range    Prothrombin time 24.5 (H) 11.5 - 15.2 sec    INR 2.2 (H) 0.8 - 1.2     PTT    Collection Time: 07/27/19  2:25 PM   Result Value Ref Range    aPTT 48.1 (H) 23.0 - 36.4 SEC   CBC WITH AUTOMATED DIFF    Collection Time: 07/27/19  2:25 PM   Result Value Ref Range    WBC 2.5 (L) 4.6 - 13.2 K/uL    RBC 2.06 (L) 4.20 - 5.30 M/uL    HGB 7.4 (L) 12.0 - 16.0 g/dL    HCT 21.2 (L) 35.0 - 45.0 %    .9 (H) 74.0 - 97.0 FL    MCH 35.9 (H) 24.0 - 34.0 PG    MCHC 34.9 31.0 - 37.0 g/dL    RDW 17.3 (H) 11.6 - 14.5 %    PLATELET 543 854 - 714 K/uL    MPV 9.6 9.2 - 11.8 FL    NEUTROPHILS 68 40 - 73 %    LYMPHOCYTES 16 (L) 21 - 52 %    MONOCYTES 14 (H) 3 - 10 %    EOSINOPHILS 2 0 - 5 %    BASOPHILS 0 0 - 2 %    ABS. NEUTROPHILS 1.7 (L) 1.8 - 8.0 K/UL    ABS. LYMPHOCYTES 0.4 (L) 0.9 - 3.6 K/UL    ABS. MONOCYTES 0.4 0.05 - 1.2 K/UL    ABS. EOSINOPHILS 0.1 0.0 - 0.4 K/UL    ABS.  BASOPHILS 0.0 0.0 - 0.1 K/UL    DF AUTOMATED METABOLIC PANEL, COMPREHENSIVE    Collection Time: 07/27/19  2:25 PM   Result Value Ref Range    Sodium 141 136 - 145 mmol/L    Potassium 3.2 (L) 3.5 - 5.5 mmol/L    Chloride 109 100 - 111 mmol/L    CO2 25 21 - 32 mmol/L    Anion gap 7 3.0 - 18 mmol/L    Glucose 160 (H) 74 - 99 mg/dL    BUN 4 (L) 7.0 - 18 MG/DL    Creatinine 0.59 (L) 0.6 - 1.3 MG/DL    BUN/Creatinine ratio 7 (L) 12 - 20      GFR est AA >60 >60 ml/min/1.73m2    GFR est non-AA >60 >60 ml/min/1.73m2    Calcium 8.0 (L) 8.5 - 10.1 MG/DL    Bilirubin, total 12.8 (H) 0.2 - 1.0 MG/DL    ALT (SGPT) 37 13 - 56 U/L    AST (SGOT) 74 (H) 10 - 38 U/L    Alk.  phosphatase 107 45 - 117 U/L    Protein, total 5.0 (L) 6.4 - 8.2 g/dL    Albumin 2.1 (L) 3.4 - 5.0 g/dL    Globulin 2.9 2.0 - 4.0 g/dL    A-G Ratio 0.7 (L) 0.8 - 1.7     GLUCOSE, POC    Collection Time: 07/27/19  9:13 PM   Result Value Ref Range    Glucose (POC) 137 (H) 70 - 110 mg/dL       Signed By: Jaime Nicole MD     July 27, 2019

## 2019-07-29 NOTE — PROGRESS NOTES
[email or web address]  [phone]    Gastroenterology follow up-Progress note    Impression:  1. Hx of pancreatic ca s/p whipple procedure 4/19 now on chemo rad therapy   2. Weakness fatigue and weight loss - multifactorial   3. Jaundice and elevated liver enzymes, Profound hepatosteatosis noted on imaging along with mild ascites Unclear etiology for this, HCV neg in 2017 - slightly improving, MRI/MRCP as noted below. 4. Diarrhea likely due to malabsorption - stable    IMPRESSION:     1. Technically quite suboptimal study due to motion artifact and anatomic  alteration, probably related to prior surgery. 2.  Multiphasic CT with contrast may be able to provide more detailed anatomic  information in light of inherent technical difficulties of MRCP in the patient's  current condition. 3.  Pronounced hepatosteatosis. 4.  Suspected dilated cystic duct. Plan:  1. CT with liver protocol as recommended on MRCP above  2. Continue to monitor LFTs   3. Creon when taking PO  4. Medical management per primary team    Chief Complaint: weakness, abnormal LFTs      Subjective:  Denies abdominal pain, nausea, vomiting    ROS: Denies any fevers, chills, rash.      Eyes: conjunctiva normal, EOM normal   Neck: ROM normal, supple and trachea normal   Cardiovascular: heart normal, intact distal pulses, normal rate and regular rhythm   Pulmonary/Chest Wall: breath sounds normal and effort normal   Abdominal: Distended, bowel sounds normal, non-acute, non-tender     Patient Active Problem List   Diagnosis Code    Hypokalemia E87.6    Jaundice R17    Essential hypertension I10    Obstructive hyperbilirubinemia K83.8    Pancreatic adenocarcinoma (HCC) C25.9    Type 2 diabetes mellitus without complication, without long-term current use of insulin (HCC) E11.9    Hypoalbuminemia due to protein-calorie malnutrition (HCC) E46         Visit Vitals  /73 (BP 1 Location: Right arm, BP Patient Position: At rest;Head of bed elevated (Comment degrees))   Pulse 100   Temp 98.2 °F (36.8 °C)   Resp 20   Ht 4' 5\" (1.346 m) Comment: per patient   Wt 62.1 kg (137 lb)   SpO2 97%   Breastfeeding? No   BMI 34.29 kg/m²           Intake/Output Summary (Last 24 hours) at 7/29/2019 1446  Last data filed at 7/29/2019 0915  Gross per 24 hour   Intake 1440 ml   Output 2550 ml   Net -1110 ml       CBC w/Diff    Lab Results   Component Value Date/Time    WBC 3.0 (L) 07/29/2019 04:45 AM    RBC 2.19 (L) 07/29/2019 04:45 AM    HGB 7.8 (L) 07/29/2019 04:45 AM    HCT 22.5 (L) 07/29/2019 04:45 AM    .7 (H) 07/29/2019 04:45 AM    MCH 35.6 (H) 07/29/2019 04:45 AM    MCHC 34.7 07/29/2019 04:45 AM    RDW 16.8 (H) 07/29/2019 04:45 AM     07/29/2019 04:45 AM    Lab Results   Component Value Date/Time    GRANS 56 07/29/2019 04:45 AM    LYMPH 23 07/29/2019 04:45 AM    EOS 3 07/29/2019 04:45 AM    BASOS 1 07/29/2019 04:45 AM      Basic Metabolic Profile   Recent Labs     07/29/19  0445      K 3.3*      CO2 27   BUN 3*   CA 7.3*        Hepatic Function    Lab Results   Component Value Date/Time    ALB 1.7 (L) 07/29/2019 04:45 AM    TP 4.7 (L) 07/29/2019 04:45 AM    AP 99 07/29/2019 04:45 AM    Lab Results   Component Value Date/Time    SGOT 59 (H) 07/29/2019 04:45 AM          Coags   Recent Labs     07/27/19  1425   PTP 24.5*   INR 2.2*   APTT 48.1*               SHIRAZ Hebert    [facility]. [email or web address]  Phone: [phone]  Pager: [phone]

## 2019-07-29 NOTE — PROGRESS NOTES
Hematology / Oncology Progress Note  [facility]      Patient ID:  Sylvia Benjamin  61 y.o.  1955    Admit Date: 2019    Assessment:     Principal Problem:    Obstructive hyperbilirubinemia (2019)    Active Problems:    Hypokalemia (2019)      Jaundice (2019)      Pancreatic adenocarcinoma ([facility] (2018)      Hypoalbuminemia due to protein-calorie malnutrition ([facility] (2019)    JAUNDICE has progressed from admission at [facility] earlier this month    Plan:     Track cbc, lytes, lft  Check mthfr, methylmalonic acid  dont know of antidote for poss 5FU hepatoxicity this far out. Would pursue supportive measures rely on GI Med for recommendations  Await MRI MRCP results    Subjective:   Denies pain this am.  No nausea, no bleeding. Objective:     Visit Vitals  /73 (BP 1 Location: Right arm, BP Patient Position: At rest;Head of bed elevated (Comment degrees))   Pulse 100   Temp 98.2 °F (36.8 °C)   Resp 20   Ht 4' 5\" (1.346 m) Comment: per patient   Wt 62.1 kg (137 lb)   SpO2 97%   Breastfeeding? No   BMI 34.29 kg/m²             Temp (24hrs), Av.9 °F (36.6 °C), Min:97.2 °F (36.2 °C), Max:98.8 °F (37.1 °C)        Intake/Output Summary (Last 24 hours) at 2019 0953  Last data filed at 2019 0915  Gross per 24 hour   Intake 1440 ml   Output 3350 ml   Net -1910 ml       Review of Systems:   Constitutional:  No Fever; No chills; No weight loss    Skin:  No rash; No itching   HEENT:  No changes in vision or hearing   Cardiovascular:  No palpitations, chest pain, angina   Respiratory:  No shortness of breath, no wheezing, no pleurisy, no cough, no  hemoptysis   Gastrointestinal:  No nausea or vomiting; No diarrhea, no dyspepsis   Genitourinary:  No dysuria; No increase in urinary frequency   Musculoskeletal:  No weakness or muscle pains   Endo:  No polyuria; no symptoms of thyroid dysfunction   Heme:  No bruising;  No bleeding, no adenopathy   Neurological:  No Seizures; No focal weakness or sensory changes   Psychiatric:  No mood changes; no suicidal ideation       Physical Exam:  General appearance - alert, looks unwell  Eyes - pupils equal and reactive, extraocular eye movements intact, icteric  Ears - not examined  Mouth - mucous membranes moist, pharynx normal without lesions  Neck - supple, no significant adenopathy  Lymphatics - no palpable lymphadenopathy, no hepatosplenomegaly  Chest - clear to auscultation, no wheezes, rales or rhonchi, symmetric air entry  Heart - normal rate, regular rhythm, normal S1, S2, no murmurs, rubs, clicks or gallops  Abdomen - soft, nontender, nondistended, no masses or organomegaly  Back exam - not examined  Neurological - alert, oriented, normal speech, no focal findings or movement disorder noted  Extremities - bipedal edema    Skin - normal coloration and turgor, no rashes, no suspicious skin lesions noted          Labs:  Basic Metabolic Profile   Recent Labs     07/29/19  0445 07/28/19  0301 07/27/19  1425    141 141   CO2 27 25 25   BUN 3* 3* 4*        CBC w/Diff    Recent Labs     07/29/19  0445 07/28/19  0301 07/27/19  1425   WBC 3.0* 3.0* 2.5*   HCT 22.5* 25.7* 21.2*    No results for input(s): BANDS, LYMPHOCYTES in the last 72 hours.     No lab exists for component: SEGS     Hepatic Panel   Hepatic Function  No results found for: ALBUMIN      Coagulation   Recent Labs     07/27/19  1425   INR 2.2*   APTT 48.1*          Current medications reviewed    Ольга Wren MD   [facility]

## 2019-07-29 NOTE — PROGRESS NOTES
Problem: Pressure Injury - Risk of  Goal: *Prevention of pressure injury  Description  Document Tyson Scale and appropriate interventions in the flowsheet. Outcome: Progressing Towards Goal  Note:   Pressure Injury Interventions:  Sensory Interventions: Assess changes in LOC, Check visual cues for pain, Float heels, Keep linens dry and wrinkle-free, Minimize linen layers, Turn and reposition approx. every two hours (pillows and wedges if needed)    Moisture Interventions: Absorbent underpads, Assess need for specialty bed, Minimize layers, Offer toileting Q_hr    Activity Interventions: Assess need for specialty bed    Mobility Interventions: Assess need for specialty bed, Float heels, HOB 30 degrees or less, Turn and reposition approx. every two hours(pillow and wedges)    Nutrition Interventions: Document food/fluid/supplement intake    Friction and Shear Interventions: HOB 30 degrees or less, Lift sheet, Minimize layers                Problem: Patient Education: Go to Patient Education Activity  Goal: Patient/Family Education  Outcome: Progressing Towards Goal     Problem: Falls - Risk of  Goal: *Absence of Falls  Description  Document Alcides Torres Fall Risk and appropriate interventions in the flowsheet.   Outcome: Progressing Towards Goal  Note:   Fall Risk Interventions:  Mobility Interventions: Bed/chair exit alarm, Patient to call before getting OOB, Utilize walker, cane, or other assistive device    Mentation Interventions: Adequate sleep, hydration, pain control, Bed/chair exit alarm    Medication Interventions: Bed/chair exit alarm, Patient to call before getting OOB, Teach patient to arise slowly    Elimination Interventions: Bed/chair exit alarm, Call light in reach, Patient to call for help with toileting needs, Toilet paper/wipes in reach, Toileting schedule/hourly rounds    History of Falls Interventions: Bed/chair exit alarm, Room close to nurse's station         Problem: Patient Education: Go to Patient Education Activity  Goal: Patient/Family Education  Outcome: Progressing Towards Goal     Problem: Pain  Goal: *Control of Pain  Outcome: Progressing Towards Goal     Problem: General Infection Care Plan (Adult and Pediatric)  Goal: Improvement in signs and symptoms of infection  Outcome: Progressing Towards Goal  Goal: *Optimize nutritional status  Outcome: Progressing Towards Goal     Problem: Nutrition Deficit  Goal: *Optimize nutritional status  Outcome: Progressing Towards Goal     Problem: Risk for Spread of Infection  Goal: Prevent transmission of infectious organism to others  Description  Prevent the transmission of infectious organisms to other patients, staff members, and visitors.   Outcome: Progressing Towards Goal     Problem: Patient Education:  Go to Education Activity  Goal: Patient/Family Education  Outcome: Progressing Towards Goal

## 2019-07-29 NOTE — PROGRESS NOTES
Pt is c/o of LE pain   Has very low albumin and LE edema     Plan   Morphine PRN  Albumin 25  Q 6 x 4

## 2019-07-29 NOTE — PROGRESS NOTES
NUTRITION    Nutrition Screen      RECOMMENDATIONS / PLAN:     - Monitor GI symptoms and readiness for diet advancement  - add supplement: Ensure Clear TID   - Continue RD inpatient monitoring and evaluation. NUTRITION INTERVENTIONS & DIAGNOSIS:     [x] Meals/snacks: modified composition  [x] Medical food supplement therapy: initiate    Nutrition Diagnosis:   Unintended weight loss related to predicted prolonged inadequate oral intake as evidenced by net wt loss of 56 lb, 29% x 3 years PTA. Inadequate energy intake related to insufficient calorie diet due to pt inability to tolerate solid food as evidenced by pt being restricted to liquid diet. ASSESSMENT:     7/29: Pt started on clear liquids. Reported good appetite/ meal intake; tolerating diet. Agreeable to nutrition supplement. Noted difficulties with MRCP per GI note today    7/26: Pt asleep/ lethargic at time of visit. Currently NPO for planned MRCP today. Average po intake adequate to meet patients estimated nutritional needs:   [] Yes     [x] No   [] Unable to determine at this time    Diet: DIET CLEAR LIQUID      Food Allergies:  None known   Current Appetite:   [x] Good     [x] Fair     [] Poor     [] Other:  NPO  Appetite/meal intake prior to admission:   [] Good     [] Fair     [] Poor     [x] Other:  Unknown   Feeding Limitations:  [] Swallowing difficulty    [] Chewing difficulty    [] Other:  Current Meal Intake: No data found.     BM: 7/28  Skin Integrity:  No pressure injury or wound noted; jaundiced  Edema:   [] No     [x] Yes   Pertinent Medications: Reviewed: lasix, SSI, protonix, D5 at 50 mL/hr (60 gm dextrose, 204 kcal per day)    Recent Labs     07/29/19  0445 07/28/19  0301 07/27/19  1425    141 141   K 3.3* 3.4* 3.2*    108 109   CO2 27 25 25   * 147* 160*   BUN 3* 3* 4*   CREA 0.57* 0.54* 0.59*   CA 7.3* 8.2* 8.0*   ALB 1.7* 1.9* 2.1*   SGOT 59* 73* 74*   ALT 32 36 37       Intake/Output Summary (Last 24 hours) at 7/29/2019 1537  Last data filed at 7/29/2019 0915  Gross per 24 hour   Intake 1440 ml   Output 2550 ml   Net -1110 ml       Anthropometrics:  Ht Readings from Last 1 Encounters:   07/25/19 4' 5\" (1.346 m)     Last 3 Recorded Weights in this Encounter    07/25/19 1141 07/25/19 2126 07/26/19 0405   Weight: 59 kg (130 lb) 62.4 kg (137 lb 8 oz) 62.1 kg (137 lb)     Body mass index is 34.29 kg/m². Obese, Class I    Weight History:   Noted weight loss, then weight gain during past few years; net wt loss of 56 lb, 29% x 3 years PTA per chart hx. Pt reported experiencing unplanned wt loss PTA per nursing screen    Weight Metrics 7/26/2019 7/17/2019 5/6/2019 12/24/2018 12/3/2018 9/27/2016   Weight 137 lb 130 lb 116 lb 12.8 oz 116 lb 115 lb 193 lb   BMI 34.29 kg/m2 25.39 kg/m2 22.81 kg/m2 22.65 kg/m2 22.46 kg/m2 37.69 kg/m2        Admitting Diagnosis: Hypokalemia [E87.6]  Jaundice [R17]  Pertinent PMHx:  Pancreatic cancer, DM, HTN    Education Needs:        [x] None identified  [] Identified - Not appropriate at this time  []  Identified and addressed - refer to education log  Learning Limitations:   [x] None identified  [] Identified    Cultural, Restorationist & ethnic food preferences:  [x] None identified    [] Identified and addressed     ESTIMATED NUTRITION NEEDS:     Calories: 4624-3645 kcal (HBEx1.2-1.3) based on  [x] Actual BW: 62 kg      [] IBW   Protein: 62-74 gm (1-1.2 gm/kg) based on  [x] Actual BW      [] IBW   Fluid: 1 mL/kcal     MONITORING & EVALUATION:     Nutrition Goal(s):   1. Po intake of meals will meet >75% of patient estimated nutritional needs within the next 7 days.   Outcome:  [] Met/Ongoing    [x] Progressing towards goal    [] Not progressing towards goal    [] New/Initial goal     Monitoring:   [x] Food and beverage intake   [x] Diet order   [x] Nutrition-focused physical findings   [x] Treatment/therapy   [] Weight   [] Enteral nutrition intake        Previous Recommendations (for follow-up assessments only):     [x]   Implemented       []   Not Implemented (RD to address)      [] No Longer Appropriate     [] No Recommendation Made     Discharge Planning:  Diabetic, cardiac diet  [x] Participated in care planning, discharge planning, & interdisciplinary rounds as appropriate      Deven Cruz, [address] [place] [address]   Pager: [phone]

## 2019-07-29 NOTE — INTERDISCIPLINARY ROUNDS
IDR/SLIDR Summary          Patient: Ez Johnson MRN: [id number]    Age: 61 y.o. YOB: 1955 Room/Bed: [facility]   Admit Diagnosis: Hypokalemia [E87.6]  Jaundice [R17]  Principal Diagnosis: Obstructive hyperbilirubinemia   Goals: \"To be able to eat regular foods. \"  Readmission: NO  Quality Measure: Not applicable  VTE Prophylaxis: Not needed  Influenza Vaccine screening completed? NO  Pneumococcal Vaccine screening completed? NO  Mobility needs: Yes   Nutrition plan:Yes  Consults:P.T, O.T., Speech and Case Management    Financial concerns:No  Escalated to CM? NO  RRAT Score: 13   Interventions:H2H  Testing due for pt today?  YES  LOS: 3 days Expected length of stay 5 days  Discharge plan: Home   PCP: Kita Latif MD  Transportation needs: Yes    Days before discharge:discharge pending  Discharge disposition: Home    Signed:     Valinda Landau, RN  7/28/2019  8:26 PM

## 2019-07-29 NOTE — ROUTINE PROCESS
Bedside and Verbal shift change report given to [employer], RN (oncoming nurse) by Keven Tavarez RN (offgoing nurse). Report included the following information SBAR, Kardex, MAR and Recent Results.     SITUATION:  Code Status: Full Code  Reason for Admission: Hypokalemia [E87.6]  Jaundice [address] day: 4  Problem List:       Hospital Problems  Date Reviewed: 7/17/2019          Codes Class Noted POA    Hypoalbuminemia due to protein-calorie malnutrition ([facility] ICD-10-CM: E46  ICD-9-CM: 263.9  7/26/2019 Yes        Hypokalemia ICD-10-CM: E87.6  ICD-9-CM: 276.8  7/25/2019 Yes        Jaundice ICD-10-CM: R17  ICD-9-CM: 782.4  7/25/2019 Yes        * (Principal) Obstructive hyperbilirubinemia (Chronic) ICD-10-CM: K83.8  ICD-9-CM: 576.8  7/4/2019 Yes        Pancreatic adenocarcinoma (HCC) (Chronic) ICD-10-CM: C25.9  ICD-9-CM: 157.9  11/19/2018 Yes              BACKGROUND:   Past Medical History:   Past Medical History:   Diagnosis Date    Asthma     Cancer ([facility] 11/13/2018    Pancreatic cancer    Diabetes ([facility]     Hypertension     [facility]album[facility] to [facility]-cal[facility] 7/26/2019    Ill-defined condition     \"nerve problem\"    [facility]     Syphilis       Patient taking anticoagulants no    Patient has a defibrillator: no    History of shots YES for example, flu, pneumonia, tetanus   Isolation History NO for example, MRSA, CDiff    ASSESSMENT:  Changes in Assessment Throughout Shift: NONE  Significant Changes in 24 hours (for example, RR/code, fall)  Patient has Central Line: yes Reasons if yes: Irritant/vesicant  Patient has Vasquez Cath: no Mobility Issues  PT  IV Patency  OR Checklist  Pending Tests    Last Vitals:  Vitals w/ MEWS Score (last day)     Date/Time MEWS Score Pulse Resp Temp BP Level of Consciousness SpO2    07/29/19 0441  1  100  20  98.2 °F (36.8 °C)  157/73  Alert  97 %    07/28/19 2354  2  (!) 101  20  97.4 °F (36.3 °C)  109/76  Alert  95 %    07/28/19 1951  1  94  17  97.2 °F (36.2 °C)  107/71  Alert  97 %    07/28/19 1616  1  95  16  97.9 °F (36.6 °C)  102/64  Alert  97 %    07/28/19 1215  2  (!) 102  20  98.8 °F (37.1 °C)  114/81  Alert  97 %    07/28/19 0753  1  98  16  97.4 °F (36.3 °C)  104/70  Alert  97 %    07/28/19 0338  1  94  18  97.3 °F (36.3 °C)  104/71  Alert  96 %            PAIN    Pain Assessment    Pain Intensity 1: 0 (07/29/19 0441)              Patient Stated Pain Goal: 0  Intervention effective: N/A  Time of last intervention: N/A Reassessment Completed: yes   Other actions taken for pain: Distraction    Last 3 Weights:  Last 3 Recorded Weights in this Encounter    07/25/19 1141 07/25/19 2126 07/26/19 0405   Weight: 59 kg (130 lb) 62.4 kg (137 lb 8 oz) 62.1 kg (137 lb)   Weight change:     INTAKE/OUPUT    Current Shift: No intake/output data recorded. Last three shifts: 07/27 1901 - 07/29 0700  In: 2670 [P.O.:240; I.V.:2430]  Out: 4150 [Urine:4150]    RECOMMENDATIONS AND DISCHARGE PLANNING  Patient needs and requests: Assistance with ADL's    Pending tests/procedures: labs     Discharge plan for patient: Home    Discharge planning Needs or Barriers: none    Estimated Discharge Date: 7/31/2019 Posted on Whiteboard in Patients Room: no       \"HEALS\" SAFETY CHECK  A safety check occurred in the patient's room between off going nurse and oncoming nurse listed above. The safety check included the below items:    H  High Alert Medications Verify all high alert medication drips (heparin, PCA, etc.)  E  Equipment Suction is set up for ALL patients (with yanker)  Red plugs utilized for all equipment (IV pumps, etc.)  WOWs wiped down at end of shift.   Room stocked with oxygen, suction, and other unit-specific supplies  A  Alarms Bed alarm is set for fall risk patients  Ensure chair alarm is in place and activated if patient is up in a chair  L  Lines Check IV for any infiltration  Vasquez bag is empty if patient has a Vasquez   Tubing and IV bags are labeled  S  Safety  Room is clean, patient is clean, and equipment is clean. Hallways are clear from equipment besides carts. Fall bracelet on for fall risk patients  Ensure room is clear and free of clutter  Suction is set up for ALL patients (with milly)  Hallways are clear from equipment besides carts.    Isolation precautions followed, supplies available outside room, sign posted    Moshe Santo RN

## 2019-07-29 NOTE — PROGRESS NOTES
[facility] Hospitalist Group  Progress Note    Patient: Khai Beltran Age: 61 y.o. : 1955 MR#: [id number] SSN: [id number]  Date/Time: 2019     Subjective:     Pt admitted to hosp for elevated Bilirubin & obstructive jaundice in the setting of H/o Pancreatic carcinoma         Assessment/Plan:     1. Obstructive jaundice with hyperbilirubinemia - Has had whipple's procedure done -  GI consulted , await MRCP report   2. H/o Pancreatic cancer - start creon when able to tolerate - Oncology on board  - CTA chest done 2y to Tachycardia - negative   3. Chronic anemia - monitor H&H    4. Mild elevation in LFT's - monitor   DVT px - elevated INR   FC              Case discussed with:  [x]Patient  []Family  []Nursing  []Case Management  DVT Prophylaxis:  []Lovenox  []Hep SQ  []SCDs  []Coumadin   []On Heparin gtt    Objective:   VS:   Visit Vitals  /71 (BP 1 Location: Right arm, BP Patient Position: At rest;Head of bed elevated (Comment degrees))   Pulse 94   Temp 97.2 °F (36.2 °C)   Resp 17   Ht 4' 5\" (1.346 m) Comment: per patient   Wt 62.1 kg (137 lb)   SpO2 97%   Breastfeeding? No   BMI 34.29 kg/m²      Tmax/24hrs: Temp (24hrs), Av.7 °F (36.5 °C), Min:97.2 °F (36.2 °C), Max:98.8 °F (37.1 °C)  IOBRIEF    Intake/Output Summary (Last 24 hours) at 2019  Last data filed at 2019 2201  Gross per 24 hour   Intake 1470 ml   Output 2950 ml   Net -1480 ml       General:  Alert, cooperative, no acute distress    HEENT: PERRLA, icteric sclera & skin . Pulmonary:  CTA Bilaterally. No Wheezing/Rhonchi/Rales. Cardiovascular: Regular rate and Rhythm. GI:  Soft, Non distended, Non tender. + Bowel sounds. Extremities:  No edema, cyanosis, clubbing. No calf tenderness. Neurologic: Alert and oriented X 3. No acute neuro deficits.   Additional:    Medications:   Current Facility-Administered Medications   Medication Dose Route Frequency    dextrose 5% infusion  50 mL/hr IntraVENous CONTINUOUS    cefepime (MAXIPIME) 2 g in sterile water (preservative free) 10 mL IV syringe  2 g IntraVENous Q12H    sodium chloride (NS) flush 5-10 mL  5-10 mL IntraVENous PRN    busPIRone (BUSPAR) tablet 10 mg  10 mg Oral TID    pantoprazole (PROTONIX) 40 mg in sodium chloride 0.9% 10 mL injection  40 mg IntraVENous Q12H    insulin lispro (HUMALOG) injection   SubCUTAneous AC&HS    glucose chewable tablet 16 g  4 Tab Oral PRN    glucagon (GLUCAGEN) injection 1 mg  1 mg IntraMUSCular PRN    dextrose 10% infusion 125-250 mL  125-250 mL IntraVENous PRN    furosemide (LASIX) injection 40 mg  40 mg IntraVENous PRN       Labs:    Recent Results (from the past 24 hour(s))   CBC WITH AUTOMATED DIFF    Collection Time: 07/28/19  3:01 AM   Result Value Ref Range    WBC 3.0 (L) 4.6 - 13.2 K/uL    RBC 2.46 (L) 4.20 - 5.30 M/uL    HGB 8.6 (L) 12.0 - 16.0 g/dL    HCT 25.7 (L) 35.0 - 45.0 %    .5 (H) 74.0 - 97.0 FL    MCH 35.0 (H) 24.0 - 34.0 PG    MCHC 33.5 31.0 - 37.0 g/dL    RDW 17.0 (H) 11.6 - 14.5 %    PLATELET 914 429 - 361 K/uL    MPV 9.6 9.2 - 11.8 FL    NEUTROPHILS 57 40 - 73 %    LYMPHOCYTES 15 (L) 21 - 52 %    MONOCYTES 24 (H) 3 - 10 %    EOSINOPHILS 3 0 - 5 %    BASOPHILS 1 0 - 2 %    ABS. NEUTROPHILS 1.7 (L) 1.8 - 8.0 K/UL    ABS. LYMPHOCYTES 0.5 (L) 0.9 - 3.6 K/UL    ABS. MONOCYTES 0.7 0.05 - 1.2 K/UL    ABS. EOSINOPHILS 0.1 0.0 - 0.4 K/UL    ABS.  BASOPHILS 0.0 0.0 - 0.1 K/UL    DF AUTOMATED     METABOLIC PANEL, COMPREHENSIVE    Collection Time: 07/28/19  3:01 AM   Result Value Ref Range    Sodium 141 136 - 145 mmol/L    Potassium 3.4 (L) 3.5 - 5.5 mmol/L    Chloride 108 100 - 111 mmol/L    CO2 25 21 - 32 mmol/L    Anion gap 8 3.0 - 18 mmol/L    Glucose 147 (H) 74 - 99 mg/dL    BUN 3 (L) 7.0 - 18 MG/DL    Creatinine 0.54 (L) 0.6 - 1.3 MG/DL    BUN/Creatinine ratio 6 (L) 12 - 20      GFR est AA >60 >60 ml/min/1.73m2    GFR est non-AA >60 >60 ml/min/1.73m2    Calcium 8.2 (L) 8.5 - 10.1 MG/DL    Bilirubin, total 12.3 (H) 0.2 - 1.0 MG/DL    ALT (SGPT) 36 13 - 56 U/L    AST (SGOT) 73 (H) 10 - 38 U/L    Alk.  phosphatase 108 45 - 117 U/L    Protein, total 5.0 (L) 6.4 - 8.2 g/dL    Albumin 1.9 (L) 3.4 - 5.0 g/dL    Globulin 3.1 2.0 - 4.0 g/dL    A-G Ratio 0.6 (L) 0.8 - 1.7     GLUCOSE, POC    Collection Time: 07/28/19  8:00 AM   Result Value Ref Range    Glucose (POC) 131 (H) 70 - 110 mg/dL   GLUCOSE, POC    Collection Time: 07/28/19 11:53 AM   Result Value Ref Range    Glucose (POC) 173 (H) 70 - 110 mg/dL   GLUCOSE, POC    Collection Time: 07/28/19  4:14 PM   Result Value Ref Range    Glucose (POC) 160 (H) 70 - 110 mg/dL   GLUCOSE, POC    Collection Time: 07/28/19  9:47 PM   Result Value Ref Range    Glucose (POC) 199 (H) 70 - 110 mg/dL       Signed By: Josef Mclaughlin MD     July 28, 2019

## 2019-07-30 NOTE — PROGRESS NOTES
conducted a Follow up consultation and Spiritual Assessment for Tito Barajas, who is a 61 y.o.,female. The  provided the following Interventions:  Continued the relationship of care and support. Listened empathically. Offered prayer and assurance of continued prayer on patients behalf. Chart reviewed. The following outcomes were achieved:  Patient expressed gratitude for 's visit. Assessment:  There are no further spiritual or Faith issues which require Spiritual Care Services interventions at this time. Plan:  Chaplains will continue to follow and will provide pastoral care on an as needed/requested basis.  recommends bedside caregivers page  on duty if patient shows signs of acute spiritual or emotional distress.        [id number] [facility]   [phone]

## 2019-07-30 NOTE — PROGRESS NOTES
Problem: Pressure Injury - Risk of  Goal: *Prevention of pressure injury  Description  Document Tyson Scale and appropriate interventions in the flowsheet. Outcome: Progressing Towards Goal  Note:   Pressure Injury Interventions:  Sensory Interventions: Assess need for specialty bed, Minimize linen layers, Maintain/enhance activity level, Assess changes in LOC    Moisture Interventions: Check for incontinence Q2 hours and as needed, Absorbent underpads, Limit adult briefs, Maintain skin hydration (lotion/cream)    Activity Interventions: Increase time out of bed, PT/OT evaluation    Mobility Interventions: PT/OT evaluation, Pressure redistribution bed/mattress (bed type), HOB 30 degrees or less    Nutrition Interventions: Document food/fluid/supplement intake    Friction and Shear Interventions: Apply protective barrier, creams and emollients, Foam dressings/transparent film/skin sealants, Minimize layers                Problem: Falls - Risk of  Goal: *Absence of Falls  Description  Document Bassam Fall Risk and appropriate interventions in the flowsheet.   Outcome: Progressing Towards Goal  Note:   Fall Risk Interventions:  Mobility Interventions: Bed/chair exit alarm, Communicate number of staff needed for ambulation/transfer, Patient to call before getting OOB, Utilize walker, cane, or other assistive device    Mentation Interventions: Adequate sleep, hydration, pain control, Bed/chair exit alarm, Toileting rounds, Reorient patient    Medication Interventions: Bed/chair exit alarm, Patient to call before getting OOB, Teach patient to arise slowly    Elimination Interventions: Bed/chair exit alarm, Call light in reach, Patient to call for help with toileting needs    History of Falls Interventions: Bed/chair exit alarm         Problem: Pain  Goal: *Control of Pain  Outcome: Progressing Towards Goal     Problem: General Infection Care Plan (Adult and Pediatric)  Goal: *Optimize nutritional status  Outcome: Progressing Towards Goal

## 2019-07-30 NOTE — PROGRESS NOTES
[email or web address]  [phone]    Gastroenterology follow up-Progress note    Impression:  1. 1.  Hx of pancreatic ca s/p whipple procedure 4/19 now on chemo rad therapy   2. Weakness fatigue and weight loss - multifactorial   3. Jaundice and elevated Tbili, transaminases fairly normal, ? due to 5-Fu. Need to determine when her last dose occurred. - CT negative for obvious obstructive source  4. Diarrhea improved    CT 7/29/2019  IMPRESSION:     No evidence of biliary duct dilatation. Small amount of intrahepatic  pneumobilia, status post Whipple. Correlate clinically with surgical history.  -Profound hepatic steatosis. -A nonmasslike stellate-like density in the left hepatic lobe, possibly fibrosis  or vascular lesion, similar to outside CT from 7/3/2019. Recommend continued  attention on follow-up imaging.     Pancreatic atrophy with a suspected pancreatic stent through a possible  pancreatic jejunostomy. Again, recommend correlation with surgical history. Probable collapsed bowel along the inferior margin of the right hepatic lobe,  however underlying lesion difficult to completely exclude. Recommend attention  on follow-up imaging.  -No definite pancreatic mass.     Some radiodense material within the renal collecting systems on precontrast  imaging. Recommend correlation with urinalysis.     Areas of gastric, small bowel, and large bowel wall thickening, possibly a  nonspecific gastroenteritis, or may be related to volume overload in this  setting.  -Evidence of anasarca with moderate volume ascites, patchy mesenteric edema,  right greater than left pleural effusions. Plan:  1. Monitor LFTs, if due to 5-Fu should start to see downward trend  2. Check INR  3. Continue Creon  4. Medical management per primary team    Chief Complaint: Weakness, abnormal LFTs      Subjective:  Denies abdominal pain, nausea or vomiting    ROS: Denies any fevers, chills, rash.      Eyes: Scleral icterus, EOM normal   Neck: ROM normal, supple and trachea normal   Cardiovascular: heart normal, intact distal pulses, normal rate and regular rhythm   Pulmonary/Chest Wall: breath sounds normal and effort normal   Abdominal: appearance normal, bowel sounds normal and soft, non-acute, non-tender     Patient Active Problem List   Diagnosis Code    Hypokalemia E87.6    Jaundice R17    Essential hypertension I10    Obstructive hyperbilirubinemia K83.8    Pancreatic adenocarcinoma (HCC) C25.9    Type 2 diabetes mellitus without complication, without long-term current use of insulin (HCC) E11.9    Hypoalbuminemia due to protein-calorie malnutrition (HCC) E46         Visit Vitals  /71 (BP 1 Location: Right arm)   Pulse 95   Temp 97.8 °F (36.6 °C)   Resp 18   Ht 4' 5\" (1.346 m) Comment: per patient   Wt 62.1 kg (137 lb)   SpO2 96%   Breastfeeding?  No   BMI 34.29 kg/m²           Intake/Output Summary (Last 24 hours) at 7/30/2019 1324  Last data filed at 7/30/2019 0304  Gross per 24 hour   Intake    Output 300 ml   Net -300 ml       CBC w/Diff    Lab Results   Component Value Date/Time    WBC 3.3 (L) 07/30/2019 04:10 AM    RBC 2.08 (L) 07/30/2019 04:10 AM    HGB 7.6 (L) 07/30/2019 04:10 AM    HCT 21.7 (L) 07/30/2019 04:10 AM    .3 (H) 07/30/2019 04:10 AM    MCH 36.5 (H) 07/30/2019 04:10 AM    MCHC 35.0 07/30/2019 04:10 AM    RDW 16.6 (H) 07/30/2019 04:10 AM     07/30/2019 04:10 AM    Lab Results   Component Value Date/Time    GRANS 56 07/29/2019 04:45 AM    LYMPH 23 07/29/2019 04:45 AM    EOS 3 07/29/2019 04:45 AM    BASOS 1 07/29/2019 04:45 AM      Basic Metabolic Profile   Recent Labs     07/30/19  0410      K 2.8*      CO2 26   BUN 4*   CA 8.2*        Hepatic Function    Lab Results   Component Value Date/Time    ALB 2.4 (L) 07/30/2019 04:10 AM    TP 5.1 (L) 07/30/2019 04:10 AM    AP 95 07/30/2019 04:10 AM    Lab Results   Component Value Date/Time    SGOT 51 (H) 07/30/2019 04:10 AM          Coags   Recent Labs     07/27/19  1425   PTP 24.5*   INR 2.2*   APTT 48.1*               SHIRAZ Hebert    [facility]. [facility] [facility]  Phone: [phone]  Pager: [phone]

## 2019-07-30 NOTE — PROGRESS NOTES
[facility] Hospitalist Group  Progress Note    Patient: Alexx Milton Age: 61 y.o. : 1955 MR#: [id number] SSN: [id number]  Date/Time: 2019     Subjective:     Pt admitted to hosp for elevated Bilirubin & obstructive jaundice in the setting of H/o Pancreatic carcinoma         Assessment/Plan:     1. Obstructive jaundice with hyperbilirubinemia - Has had whipple's procedure done -  GI consulted , MRCP report reviewed - CT negative for obstruction   2. H/o Pancreatic cancer - start creon when able to tolerate - Oncology on board  - CTA chest done 2y to Tachycardia - negative   3. Chronic anemia - monitor H&H    4. Mild elevation in LFT's - monitor   DVT px - elevated INR   FC      T bili still elevated , GI & Oncology on board - will f/u with them       Case discussed with:  [x]Patient  []Family  []Nursing  []Case Management  DVT Prophylaxis:  []Lovenox  []Hep SQ  []SCDs  []Coumadin   []On Heparin gtt    Objective:   VS:   Visit Vitals  /71 (BP 1 Location: Right arm)   Pulse 95   Temp 97.8 °F (36.6 °C)   Resp 18   Ht 4' 5\" (1.346 m) Comment: per patient   Wt 62.1 kg (137 lb)   SpO2 96%   Breastfeeding? No   BMI 34.29 kg/m²      Tmax/24hrs: Temp (24hrs), Av.8 °F (36.6 °C), Min:97.4 °F (36.3 °C), Max:98.1 °F (36.7 °C)  IOBRIEF    Intake/Output Summary (Last 24 hours) at 2019 1814  Last data filed at 2019 0304  Gross per 24 hour   Intake    Output 300 ml   Net -300 ml       General:  Alert, cooperative, no acute distress    HEENT: PERRLA, icteric sclera & skin . Pulmonary:  CTA Bilaterally. No Wheezing/Rhonchi/Rales. Cardiovascular: Regular rate and Rhythm. GI:  Soft, Non distended, Non tender. + Bowel sounds. Extremities:  No edema, cyanosis, clubbing. No calf tenderness. Neurologic: Alert and oriented X 3. No acute neuro deficits.   Additional:    Medications:   Current Facility-Administered Medications   Medication Dose Route Frequency    morphine injection 1 mg  1 mg IntraVENous Q8H PRN    dextrose 5% infusion  50 mL/hr IntraVENous CONTINUOUS    cefepime (MAXIPIME) 2 g in sterile water (preservative free) 10 mL IV syringe  2 g IntraVENous Q12H    sodium chloride (NS) flush 5-10 mL  5-10 mL IntraVENous PRN    busPIRone (BUSPAR) tablet 10 mg  10 mg Oral TID    pantoprazole (PROTONIX) 40 mg in sodium chloride 0.9% 10 mL injection  40 mg IntraVENous Q12H    insulin lispro (HUMALOG) injection   SubCUTAneous AC&HS    glucose chewable tablet 16 g  4 Tab Oral PRN    glucagon (GLUCAGEN) injection 1 mg  1 mg IntraMUSCular PRN    dextrose 10% infusion 125-250 mL  125-250 mL IntraVENous PRN    furosemide (LASIX) injection 40 mg  40 mg IntraVENous PRN       Labs:    Recent Results (from the past 24 hour(s))   CBC W/O DIFF    Collection Time: 07/29/19  8:50 PM   Result Value Ref Range    WBC 3.9 (L) 4.6 - 13.2 K/uL    RBC 2.36 (L) 4.20 - 5.30 M/uL    HGB 8.4 (L) 12.0 - 16.0 g/dL    HCT 24.5 (L) 35.0 - 45.0 %    .8 (H) 74.0 - 97.0 FL    MCH 35.6 (H) 24.0 - 34.0 PG    MCHC 34.3 31.0 - 37.0 g/dL    RDW 16.9 (H) 11.6 - 14.5 %    PLATELET 062 880 - 141 K/uL    MPV 10.3 9.2 - 11.8 FL   GLUCOSE, POC    Collection Time: 07/29/19 10:55 PM   Result Value Ref Range    Glucose (POC) 196 (H) 70 - 110 mg/dL   CBC W/O DIFF    Collection Time: 07/30/19  4:10 AM   Result Value Ref Range    WBC 3.3 (L) 4.6 - 13.2 K/uL    RBC 2.08 (L) 4.20 - 5.30 M/uL    HGB 7.6 (L) 12.0 - 16.0 g/dL    HCT 21.7 (L) 35.0 - 45.0 %    .3 (H) 74.0 - 97.0 FL    MCH 36.5 (H) 24.0 - 34.0 PG    MCHC 35.0 31.0 - 37.0 g/dL    RDW 16.6 (H) 11.6 - 14.5 %    PLATELET 154 287 - 562 K/uL    MPV 10.1 9.2 - 14.6 FL   METABOLIC PANEL, COMPREHENSIVE    Collection Time: 07/30/19  4:10 AM   Result Value Ref Range    Sodium 138 136 - 145 mmol/L    Potassium 2.8 (LL) 3.5 - 5.5 mmol/L    Chloride 105 100 - 111 mmol/L    CO2 26 21 - 32 mmol/L    Anion gap 7 3.0 - 18 mmol/L    Glucose 157 (H) 74 - 99 mg/dL BUN 4 (L) 7.0 - 18 MG/DL    Creatinine 0.61 0.6 - 1.3 MG/DL    BUN/Creatinine ratio 7 (L) 12 - 20      GFR est AA >60 >60 ml/min/1.73m2    GFR est non-AA >60 >60 ml/min/1.73m2    Calcium 8.2 (L) 8.5 - 10.1 MG/DL    Bilirubin, total 13.1 (H) 0.2 - 1.0 MG/DL    ALT (SGPT) 29 13 - 56 U/L    AST (SGOT) 51 (H) 10 - 38 U/L    Alk.  phosphatase 95 45 - 117 U/L    Protein, total 5.1 (L) 6.4 - 8.2 g/dL    Albumin 2.4 (L) 3.4 - 5.0 g/dL    Globulin 2.7 2.0 - 4.0 g/dL    A-G Ratio 0.9 0.8 - 1.7     GLUCOSE, POC    Collection Time: 07/30/19  8:34 AM   Result Value Ref Range    Glucose (POC) 154 (H) 70 - 110 mg/dL   GLUCOSE, POC    Collection Time: 07/30/19 11:07 AM   Result Value Ref Range    Glucose (POC) 122 (H) 70 - 110 mg/dL   GLUCOSE, POC    Collection Time: 07/30/19  6:05 PM   Result Value Ref Range    Glucose (POC) 118 (H) 70 - 110 mg/dL       Signed By: Marilee Almonte MD     July 30, 2019

## 2019-07-30 NOTE — ROUTINE PROCESS
Bedside shift change report given to Robby Rojas RN (oncoming nurse) by Dong Brito RN (offgoing nurse). Report included the following information SBAR, Kardex, Procedure Summary and MAR.

## 2019-07-30 NOTE — PROGRESS NOTES
Bedside shift change report given to Nat Cardona RN (oncoming nurse) by Shikha Alvarez (offgoing nurse). Report included the following information SBAR and Kardex.

## 2019-07-30 NOTE — PROGRESS NOTES
Nutrition    Pt consuming clear liquids; poor/fair meal intake but tolerating diet per RN. Pt denied having any abdominal pain or N/V. Ensure Clear is ordered, but pt not consuming, dislikes the supplement. Declined having it changed to [address] Discussed report and recommendation for advancing po diet to full liquids with Dr Chaparro Cortes; MD agreed with plan. Pt progressing towards nutrition goals. Recommendation/Plan:   - Monitor GI symptoms and readiness for diet advancement. Recommend advancing to Full Liquid diet  - Discontinue Ensure Clear supplement per pt request  - Continue RD inpatient monitoring and evaluation.       Ole Ann, [address] [place] [address]  Pager [phone]

## 2019-07-30 NOTE — ROUTINE PROCESS
Bedside shift change report given to Sd Mcnair (oncoming nurse) by Albert Chiang RN (offgoing nurse). Report included the following information SBAR, Kardex, Intake/Output, MAR, Recent Results and Quality Measures.

## 2019-07-30 NOTE — PROGRESS NOTES
[facility] Hospitalist Group  Progress Note    Patient: Rosanna Morataya Age: 61 y.o. : 1955 MR#: [id number] SSN: [id number]  Date/Time: 2019     Subjective:     Pt admitted to hosp for elevated Bilirubin & obstructive jaundice in the setting of H/o Pancreatic carcinoma         Assessment/Plan:     1. Obstructive jaundice with hyperbilirubinemia - Has had whipple's procedure done -  GI consulted , MRCP report reviewed - advised CTA Abd to evaluate better   2. H/o Pancreatic cancer - start creon when able to tolerate - Oncology on board  - CTA chest done 2y to Tachycardia - negative   3. Chronic anemia - monitor H&H    4. Mild elevation in LFT's - monitor   DVT px - elevated INR   FC              Case discussed with:  [x]Patient  []Family  []Nursing  []Case Management  DVT Prophylaxis:  []Lovenox  []Hep SQ  []SCDs  []Coumadin   []On Heparin gtt    Objective:   VS:   Visit Vitals  /68 (BP 1 Location: Right arm, BP Patient Position: At rest)   Pulse 100   Temp 98.1 °F (36.7 °C)   Resp 19   Ht 4' 5\" (1.346 m) Comment: per patient   Wt 62.1 kg (137 lb)   SpO2 100%   Breastfeeding? No   BMI 34.29 kg/m²      Tmax/24hrs: Temp (24hrs), Av.7 °F (36.5 °C), Min:97.2 °F (36.2 °C), Max:98.2 °F (36.8 °C)  IOBRIEF    Intake/Output Summary (Last 24 hours) at 2019 2155  Last data filed at 2019 0915  Gross per 24 hour   Intake 1440 ml   Output 1300 ml   Net 140 ml       General:  Alert, cooperative, no acute distress    HEENT: PERRLA, icteric sclera & skin . Pulmonary:  CTA Bilaterally. No Wheezing/Rhonchi/Rales. Cardiovascular: Regular rate and Rhythm. GI:  Soft, Non distended, Non tender. + Bowel sounds. Extremities:  No edema, cyanosis, clubbing. No calf tenderness. Neurologic: Alert and oriented X 3. No acute neuro deficits.   Additional:    Medications:   Current Facility-Administered Medications   Medication Dose Route Frequency    albumin human 25% (BUMINATE) solution 25 g  25 g IntraVENous Q6H    morphine injection 1 mg  1 mg IntraVENous Q8H PRN    dextrose 5% infusion  50 mL/hr IntraVENous CONTINUOUS    cefepime (MAXIPIME) 2 g in sterile water (preservative free) 10 mL IV syringe  2 g IntraVENous Q12H    sodium chloride (NS) flush 5-10 mL  5-10 mL IntraVENous PRN    busPIRone (BUSPAR) tablet 10 mg  10 mg Oral TID    pantoprazole (PROTONIX) 40 mg in sodium chloride 0.9% 10 mL injection  40 mg IntraVENous Q12H    insulin lispro (HUMALOG) injection   SubCUTAneous AC&HS    glucose chewable tablet 16 g  4 Tab Oral PRN    glucagon (GLUCAGEN) injection 1 mg  1 mg IntraMUSCular PRN    dextrose 10% infusion 125-250 mL  125-250 mL IntraVENous PRN    furosemide (LASIX) injection 40 mg  40 mg IntraVENous PRN       Labs:    Recent Results (from the past 24 hour(s))   CBC WITH AUTOMATED DIFF    Collection Time: 07/29/19  4:45 AM   Result Value Ref Range    WBC 3.0 (L) 4.6 - 13.2 K/uL    RBC 2.19 (L) 4.20 - 5.30 M/uL    HGB 7.8 (L) 12.0 - 16.0 g/dL    HCT 22.5 (L) 35.0 - 45.0 %    .7 (H) 74.0 - 97.0 FL    MCH 35.6 (H) 24.0 - 34.0 PG    MCHC 34.7 31.0 - 37.0 g/dL    RDW 16.8 (H) 11.6 - 14.5 %    PLATELET 137 186 - 010 K/uL    MPV 10.3 9.2 - 11.8 FL    NEUTROPHILS 56 40 - 73 %    LYMPHOCYTES 23 21 - 52 %    MONOCYTES 17 (H) 3 - 10 %    EOSINOPHILS 3 0 - 5 %    BASOPHILS 1 0 - 2 %    ABS. NEUTROPHILS 1.7 (L) 1.8 - 8.0 K/UL    ABS. LYMPHOCYTES 0.7 (L) 0.9 - 3.6 K/UL    ABS. MONOCYTES 0.5 0.05 - 1.2 K/UL    ABS. EOSINOPHILS 0.1 0.0 - 0.4 K/UL    ABS.  BASOPHILS 0.0 0.0 - 0.1 K/UL    DF AUTOMATED     METABOLIC PANEL, COMPREHENSIVE    Collection Time: 07/29/19  4:45 AM   Result Value Ref Range    Sodium 140 136 - 145 mmol/L    Potassium 3.3 (L) 3.5 - 5.5 mmol/L    Chloride 106 100 - 111 mmol/L    CO2 27 21 - 32 mmol/L    Anion gap 7 3.0 - 18 mmol/L    Glucose 143 (H) 74 - 99 mg/dL    BUN 3 (L) 7.0 - 18 MG/DL    Creatinine 0.57 (L) 0.6 - 1.3 MG/DL    BUN/Creatinine ratio 5 (L) 12 - 20      GFR est AA >60 >60 ml/min/1.73m2    GFR est non-AA >60 >60 ml/min/1.73m2    Calcium 7.3 (L) 8.5 - 10.1 MG/DL    Bilirubin, total 11.6 (H) 0.2 - 1.0 MG/DL    ALT (SGPT) 32 13 - 56 U/L    AST (SGOT) 59 (H) 10 - 38 U/L    Alk.  phosphatase 99 45 - 117 U/L    Protein, total 4.7 (L) 6.4 - 8.2 g/dL    Albumin 1.7 (L) 3.4 - 5.0 g/dL    Globulin 3.0 2.0 - 4.0 g/dL    A-G Ratio 0.6 (L) 0.8 - 1.7     GLUCOSE, POC    Collection Time: 07/29/19 10:05 AM   Result Value Ref Range    Glucose (POC) 147 (H) 70 - 110 mg/dL   GLUCOSE, POC    Collection Time: 07/29/19 12:49 PM   Result Value Ref Range    Glucose (POC) 143 (H) 70 - 110 mg/dL   GLUCOSE, POC    Collection Time: 07/29/19  5:32 PM   Result Value Ref Range    Glucose (POC) 174 (H) 70 - 110 mg/dL   CBC W/O DIFF    Collection Time: 07/29/19  8:50 PM   Result Value Ref Range    WBC 3.9 (L) 4.6 - 13.2 K/uL    RBC 2.36 (L) 4.20 - 5.30 M/uL    HGB 8.4 (L) 12.0 - 16.0 g/dL    HCT 24.5 (L) 35.0 - 45.0 %    .8 (H) 74.0 - 97.0 FL    MCH 35.6 (H) 24.0 - 34.0 PG    MCHC 34.3 31.0 - 37.0 g/dL    RDW 16.9 (H) 11.6 - 14.5 %    PLATELET 451 575 - 157 K/uL    MPV 10.3 9.2 - 11.8 FL       Signed By: Jhonny Mccormick MD     July 29, 2019

## 2019-07-30 NOTE — PROGRESS NOTES
HEMATOLOGY AND ONCOLOGY   PROGRESS NOTE      Patient: Wenceslao Tobin   MRN: [id number]      YOB: 1955      Admit Date: 2019    Assessment:     # Hyperbilirubinemia (2019)  # Hypokalemia (2019)  # Pancreatic [facility] ([facility] (2018)  # Hypoalbuminemia due to protein-calorie malnutrition ([facility] (2019)  # Leg edema  # Neutropenia    Plan:     - Follow up LFT, including T. Bilirubin. - GI is following.   - CT scan from last night, pending final report. - Monitor daily counts, lytes, and coag studies.  - Follow up on cultures. Antibiotics as per primary team.   - PT/OT  - SCD. I discussed the plan with the patient and answered her questions. She expressed an understanding. Carrie Caraballo MD  [facility] [phone]    Subjective:     - No new complaints, she feels tired and reports legs discomfort. She denies abdominal pain, nausea, or bloating. Objective:     Patient Vitals for the past 24 hrs:   BP Temp Pulse Resp SpO2   19 0800 124/78 97.9 °F (36.6 °C) (!) 109 18 97 %   19 0443 96/59 97.8 °F (36.6 °C) 94 18 97 %   19 0051 116/68 97.4 °F (36.3 °C) 99 18 97 %   19 2041 111/68 98.1 °F (36.7 °C) 100 19 100 %   19 1739 121/79 97.2 °F (36.2 °C) (!) 114  98 %         Intake/Output Summary (Last 24 hours) at 2019 0930  Last data filed at 2019 0304  Gross per 24 hour   Intake    Output 300 ml   Net -300 ml       Temp (24hrs), Av.7 °F (36.5 °C), Min:97.2 °F (36.2 °C), Max:98.1 °F (36.7 °C)       Review Of Systems:     Constitutional: negative for fevers, chills, sweats and fatigue  Eyes: negative for visual disturbance, redness and icterus  Ears, Nose, Mouth, Throat, and Face: negative for tinnitus, epistaxis, sore mouth and hoarseness  Respiratory: negative for cough, sputum, hemoptysis, pleurisy/chest pain or wheezing. Cardiovascular: see subjective.    Gastrointestinal: Jaundice, negative for reflux symptoms, nausea, vomiting, change in bowel habits, melena, diarrhea, constipation and abdominal pain. Genitourinary:negative for dysuria, nocturia, urinary incontinence  Integument: jaundice and pruritus  Hematologic/Lymphatic: negative for easy bruising, bleeding and lymphadenopathy  Musculoskeletal:negative for myalgias, arthralgias and bone pain  Neurological: negative for headaches, dizziness, seizures, paresthesia and weakness. Physical Exam:     Constitutional: Alert, oriented, not in distress  Eyes: sclera icterus, no redness  HEENT: oral mucosa is moist and clear. Respiratory: symmetrical expansion, no wheezing. Cardiovascular: S1S2 positive. Gastrointestinal: soft, benign, non tender, mildly distended. Obese. Integument: no rash, and no ecchymosis. Musculoskeletal: leg and feet edema, no clubbing. Neurological: intact, symmetrical exam with no focal motor or sensory deficits.     Lab:     Recent Results (from the past 24 hour(s))   GLUCOSE, POC    Collection Time: 07/29/19 10:05 AM   Result Value Ref Range    Glucose (POC) 147 (H) 70 - 110 mg/dL   GLUCOSE, POC    Collection Time: 07/29/19 12:49 PM   Result Value Ref Range    Glucose (POC) 143 (H) 70 - 110 mg/dL   GLUCOSE, POC    Collection Time: 07/29/19  5:32 PM   Result Value Ref Range    Glucose (POC) 174 (H) 70 - 110 mg/dL   CBC W/O DIFF    Collection Time: 07/29/19  8:50 PM   Result Value Ref Range    WBC 3.9 (L) 4.6 - 13.2 K/uL    RBC 2.36 (L) 4.20 - 5.30 M/uL    HGB 8.4 (L) 12.0 - 16.0 g/dL    HCT 24.5 (L) 35.0 - 45.0 %    .8 (H) 74.0 - 97.0 FL    MCH 35.6 (H) 24.0 - 34.0 PG    MCHC 34.3 31.0 - 37.0 g/dL    RDW 16.9 (H) 11.6 - 14.5 %    PLATELET 677 978 - 193 K/uL    MPV 10.3 9.2 - 11.8 FL   GLUCOSE, POC    Collection Time: 07/29/19 10:55 PM   Result Value Ref Range    Glucose (POC) 196 (H) 70 - 110 mg/dL   CBC W/O DIFF    Collection Time: 07/30/19  4:10 AM   Result Value Ref Range    WBC 3.3 (L) 4.6 - 13.2 K/uL    RBC 2.08 (L) 4.20 - 5.30 M/uL    HGB 7.6 (L) 12.0 - 16.0 g/dL    HCT 21.7 (L) 35.0 - 45.0 %    .3 (H) 74.0 - 97.0 FL    MCH 36.5 (H) 24.0 - 34.0 PG    MCHC 35.0 31.0 - 37.0 g/dL    RDW 16.6 (H) 11.6 - 14.5 %    PLATELET 467 335 - 039 K/uL    MPV 10.1 9.2 - 97.4 FL   METABOLIC PANEL, COMPREHENSIVE    Collection Time: 07/30/19  4:10 AM   Result Value Ref Range    Sodium 138 136 - 145 mmol/L    Potassium 2.8 (LL) 3.5 - 5.5 mmol/L    Chloride 105 100 - 111 mmol/L    CO2 26 21 - 32 mmol/L    Anion gap 7 3.0 - 18 mmol/L    Glucose 157 (H) 74 - 99 mg/dL    BUN 4 (L) 7.0 - 18 MG/DL    Creatinine 0.61 0.6 - 1.3 MG/DL    BUN/Creatinine ratio 7 (L) 12 - 20      GFR est AA >60 >60 ml/min/1.73m2    GFR est non-AA >60 >60 ml/min/1.73m2    Calcium 8.2 (L) 8.5 - 10.1 MG/DL    Bilirubin, total 13.1 (H) 0.2 - 1.0 MG/DL    ALT (SGPT) 29 13 - 56 U/L    AST (SGOT) 51 (H) 10 - 38 U/L    Alk.  phosphatase 95 45 - 117 U/L    Protein, total 5.1 (L) 6.4 - 8.2 g/dL    Albumin 2.4 (L) 3.4 - 5.0 g/dL    Globulin 2.7 2.0 - 4.0 g/dL    A-G Ratio 0.9 0.8 - 1.7     GLUCOSE, POC    Collection Time: 07/30/19  8:34 AM   Result Value Ref Range    Glucose (POC) 154 (H) 70 - 110 mg/dL

## 2019-07-31 NOTE — PROGRESS NOTES
[email or web address] 
[phone] Gastroenterology follow up-Progress note Impression: 1. Hx of pancreatic ca s/p whipple procedure 4/19 now on chemo rad therapy - last 5Fu treatment was over 2 weeks ago 2. Weakness fatigue and weight loss - multifactorial  
3. Jaundice and elevated Tbili, transaminases fairly normal, unclear source of continued Tbili elevation 4. Diarrhea improved Plan: 1. US or CT guided liver bx with IR 
2. Await PT/INR 3. Monitor LFTs 4. Continue Creon 5. Continue with current medical management per primary team 
 
Chief Complaint: Weakness, abnormal LFTs Subjective:  Decreased appetite today, denies abdominal pain or nausea Eyes: Scleral icterus, EOM normal  
Neck: ROM normal, supple and trachea normal  
Cardiovascular: heart normal, intact distal pulses, normal rate and regular rhythm Pulmonary/Chest Wall: breath sounds normal and effort normal  
Abdominal: appearance normal, bowel sounds normal and soft, non-acute, non-tender Patient Active Problem List  
Diagnosis Code  Hypokalemia E87.6  Jaundice R17  
 Essential hypertension I10  
 Obstructive hyperbilirubinemia K83.8  Pancreatic adenocarcinoma ([facility] C25.9  Type 2 diabetes mellitus without complication, without long-term current use of insulin (HCC) E11.9  Hypoalbuminemia due to protein-calorie malnutrition ([facility] E46 Visit Vitals /80 (BP 1 Location: Right arm) Pulse (!) 117 Temp 97.9 °F (36.6 °C) Resp 18 Ht 4' 5\" (1.346 m) Comment: per patient Wt 62.1 kg (137 lb) SpO2 100% Breastfeeding? No  
BMI 34.29 kg/m² Intake/Output Summary (Last 24 hours) at 7/31/2019 1326 Last data filed at 7/31/2019 7626 Gross per 24 hour Intake 899.17 ml Output 1950 ml Net -1050.83 ml CBC w/Diff Lab Results Component Value Date/Time  WBC 3.8 (L) 07/31/2019 02:45 AM  
 RBC 2.00 (L) 07/31/2019 02:45 AM  
 HGB 7.1 (L) 07/31/2019 02:45 AM  
 HCT 20.7 (L) 07/31/2019 02:45 AM  
 .5 (H) 07/31/2019 02:45 AM  
 MCH 35.5 (H) 07/31/2019 02:45 AM  
 MCHC 34.3 07/31/2019 02:45 AM  
 RDW 17.1 (H) 07/31/2019 02:45 AM  
  (L) 07/31/2019 02:45 AM  
 Lab Results Component Value Date/Time GRANS 56 07/29/2019 04:45 AM  
 LYMPH 23 07/29/2019 04:45 AM  
 EOS 3 07/29/2019 04:45 AM  
 BASOS 1 07/29/2019 04:45 AM  
  
Basic Metabolic Profile Recent Labs  
  07/31/19 
0245   
K 3.7  CO2 25 BUN 4*  
CA 8.8 Hepatic Function Lab Results Component Value Date/Time ALB 3.0 (L) 07/31/2019 02:45 AM  
 TP 5.3 (L) 07/31/2019 02:45 AM  
 AP 87 07/31/2019 02:45 AM  
 Lab Results Component Value Date/Time SGOT 40 (H) 07/31/2019 02:45 AM  
  
 
 
Coags No results for input(s): PTP, INR, APTT in the last 72 hours. No lab exists for component: [employer] Lennart Crigler, PA [facility]. [email or web address] Phone: [phone] Pager: [phone]

## 2019-07-31 NOTE — PROGRESS NOTES
Problem: Pressure Injury - Risk of 
Goal: *Prevention of pressure injury Description Document Tyson Scale and appropriate interventions in the flowsheet. Outcome: Progressing Towards Goal 
Note:  
Pressure Injury Interventions: 
Sensory Interventions: Assess changes in LOC, Check visual cues for pain, Float heels, Keep linens dry and wrinkle-free, Minimize linen layers Moisture Interventions: Absorbent underpads, Check for incontinence Q2 hours and as needed, Minimize layers Activity Interventions: Increase time out of bed Mobility Interventions: Float heels, HOB 30 degrees or less Nutrition Interventions: Document food/fluid/supplement intake Friction and Shear Interventions: HOB 30 degrees or less, Lift sheet, Minimize layers Problem: Patient Education: Go to Patient Education Activity Goal: Patient/Family Education Outcome: Progressing Towards Goal 
  
Problem: Falls - Risk of 
Goal: *Absence of Falls Description Document Amadou Bush Fall Risk and appropriate interventions in the flowsheet. Outcome: Progressing Towards Goal 
Note:  
Fall Risk Interventions: 
Mobility Interventions: Bed/chair exit alarm, Patient to call before getting OOB, Utilize walker, cane, or other assistive device Mentation Interventions: Adequate sleep, hydration, pain control, Bed/chair exit alarm, More frequent rounding, Update white board, Toileting rounds Medication Interventions: Bed/chair exit alarm, Patient to call before getting OOB, Teach patient to arise slowly Elimination Interventions: Bed/chair exit alarm, Call light in reach, Elevated toilet seat, Patient to call for help with toileting needs, Toilet paper/wipes in reach, Toileting schedule/hourly rounds, Stay With Me (per policy) History of Falls Interventions: Bed/chair exit alarm, Room close to nurse's station Problem: Patient Education: Go to Patient Education Activity Goal: Patient/Family Education Outcome: Progressing Towards Goal 
  
Problem: Pain Goal: *Control of Pain Outcome: Progressing Towards Goal 
  
Problem: General Infection Care Plan (Adult and Pediatric) Goal: Improvement in signs and symptoms of infection Outcome: Progressing Towards Goal 
Goal: *Optimize nutritional status Outcome: Progressing Towards Goal 
  
Problem: Nutrition Deficit Goal: *Optimize nutritional status Outcome: Progressing Towards Goal 
  
Problem: Risk for Spread of Infection Goal: Prevent transmission of infectious organism to others Description Prevent the transmission of infectious organisms to other patients, staff members, and visitors. Outcome: Progressing Towards Goal 
  
Problem: Patient Education:  Go to Education Activity Goal: Patient/Family Education Outcome: Progressing Towards Goal

## 2019-07-31 NOTE — PROGRESS NOTES
HEMATOLOGY AND ONCOLOGY PROGRESS NOTE Patient: Yue Pack   MRN: [id number] YOB: 1955 Admit Date: 2019 Assessment:  
 
# Hyperbilirubinemia (2019) # Hypokalemia (2019) # Pancreatic adenocarcinoma ([facility] (2018) # Hypoalbuminemia due to protein-calorie malnutrition ([facility] (2019) # Leg edema # Neutropenia Plan: - Follow up LFT, including T. Bilirubin, is up to 15 today. - GI is following, ? Need of a biopsy. - Monitor daily counts, lytes, and coag studies. - repeat INR/PTT today. - Negative blood culture.  
- PT/OT 
- SCD. I discussed the plan with the patient and answered her questions. She expressed an understanding. Emanuel Ortega MD 
[facility] Office [phone] Subjective: - She reports an improvement of her leg pain and swelling today. No fever, chills or any respiratory complaints. - No bloating, nausea, or any diarrhea today. Objective:  
 
Patient Vitals for the past 24 hrs: 
 BP Temp Pulse Resp SpO2  
19 1210 110/80 97.9 °F (36.6 °C) (!) 117 18 100 % 19 0855 (!) 114/93 97.8 °F (36.6 °C) (!) 107 18 98 % 19 0346 129/86 98 °F (36.7 °C) (!) 106 18 98 % 19 2335 126/75 97.8 °F (36.6 °C) (!) 102 18 98 % 19 1928 147/83 97.9 °F (36.6 °C) 99 18 97 % Intake/Output Summary (Last 24 hours) at 2019 1303 Last data filed at 2019 1391 Gross per 24 hour Intake 899.17 ml Output 1950 ml Net -1050.83 ml Temp (24hrs), Av.9 °F (36.6 °C), Min:97.8 °F (36.6 °C), Max:98 °F (36.7 °C) Review Of Systems:  
 
Constitutional: negative for fevers, chills, sweats and fatigue Eyes: negative for visual disturbance, redness and icterus Ears, Nose, Mouth, Throat, and Face: negative for tinnitus, epistaxis, sore mouth and hoarseness Respiratory: negative for cough, sputum, hemoptysis, pleurisy/chest pain or wheezing. Cardiovascular: see subjective. Gastrointestinal: Jaundice, negative for reflux symptoms, nausea, vomiting, change in bowel habits, melena, diarrhea, constipation and abdominal pain. Genitourinary:negative for dysuria, nocturia, urinary incontinence Integument: jaundice and pruritus Hematologic/Lymphatic: negative for easy bruising, bleeding and lymphadenopathy Musculoskeletal:negative for myalgias, arthralgias and bone pain Neurological: negative for headaches, dizziness, seizures, paresthesia and weakness. Physical Exam:  
 
Constitutional: Alert, oriented, not in distress Eyes: sclera icterus, no redness HEENT: oral mucosa is moist and clear. Respiratory: symmetrical expansion, no wheezing. Cardiovascular: S1S2 positive. Gastrointestinal: soft, benign, non tender, mildly distended. Obese. Integument: no rash, and no ecchymosis. Musculoskeletal: leg and feet edema, stable. Neurological: intact, symmetrical exam with no focal motor or sensory deficits. Lab:  
 
Recent Results (from the past 24 hour(s)) GLUCOSE, POC Collection Time: 07/30/19  6:05 PM  
Result Value Ref Range Glucose (POC) 118 (H) 70 - 110 mg/dL GLUCOSE, POC Collection Time: 07/30/19  9:07 PM  
Result Value Ref Range Glucose (POC) 151 (H) 70 - 110 mg/dL METABOLIC PANEL, COMPREHENSIVE Collection Time: 07/31/19  2:45 AM  
Result Value Ref Range Sodium 143 136 - 145 mmol/L Potassium 3.7 3.5 - 5.5 mmol/L Chloride 109 100 - 111 mmol/L  
 CO2 25 21 - 32 mmol/L Anion gap 9 3.0 - 18 mmol/L Glucose 153 (H) 74 - 99 mg/dL BUN 4 (L) 7.0 - 18 MG/DL Creatinine 0.52 (L) 0.6 - 1.3 MG/DL  
 BUN/Creatinine ratio 8 (L) 12 - 20 GFR est AA >60 >60 ml/min/1.73m2 GFR est non-AA >60 >60 ml/min/1.73m2 Calcium 8.8 8.5 - 10.1 MG/DL Bilirubin, total 15.3 (H) 0.2 - 1.0 MG/DL  
 ALT (SGPT) 22 13 - 56 U/L  
 AST (SGOT) 40 (H) 10 - 38 U/L Alk.  phosphatase 87 45 - 117 U/L  
 Protein, total 5.3 (L) 6.4 - 8.2 g/dL Albumin 3.0 (L) 3.4 - 5.0 g/dL Globulin 2.3 2.0 - 4.0 g/dL A-G Ratio 1.3 0.8 - 1.7    
CBC W/O DIFF Collection Time: 07/31/19  2:45 AM  
Result Value Ref Range WBC 3.8 (L) 4.6 - 13.2 K/uL  
 RBC 2.00 (L) 4.20 - 5.30 M/uL HGB 7.1 (L) 12.0 - 16.0 g/dL HCT 20.7 (L) 35.0 - 45.0 % .5 (H) 74.0 - 97.0 FL  
 MCH 35.5 (H) 24.0 - 34.0 PG  
 MCHC 34.3 31.0 - 37.0 g/dL  
 RDW 17.1 (H) 11.6 - 14.5 % PLATELET 326 (L) 833 - 420 K/uL MPV 10.4 9.2 - 11.8 FL  
GLUCOSE, POC Collection Time: 07/31/19  9:02 AM  
Result Value Ref Range Glucose (POC) 163 (H) 70 - 110 mg/dL GLUCOSE, POC Collection Time: 07/31/19 12:09 PM  
Result Value Ref Range Glucose (POC) 171 (H) 70 - 110 mg/dL

## 2019-07-31 NOTE — CONSULTS
Consult Note Patient: Wenceslao Tobin               Sex: female          DOA: 7/25/2019 YOB: 1955      Age:  61 y.o.        LOS:  LOS: 6 days HPI:  
 
Wenceslao Tobin is a 61 y.o. female with a history of pancreatic carcinoma s/p Whipple in April 2019 who has been seen in evaluation of abnormal LFTs at the request of Zulay Engle. Patient presented to the ED at the recommendation of her Oncologist for abnormal labs. She was found have pancytopenia, hyperbilirubinemia, hypoalbuminemia, hypokalemia and hypocalcemia. She was jaundiced and hypotensive as well. GI was consulted and she was admitted for further evaluation. CT of the abdomen showed profound steatosis without definite biliary duct dilatation. MRCP unable to evaluation for dilatation due to technical factors. Past Medical History:  
Diagnosis Date  Asthma  Cancer ([facility] 11/13/2018 Pancreatic cancer  Diabetes ([facility]  Hypertension  Hypoalbuminemia due to protein-calorie malnutrition ([facility] 7/26/2019  Ill-defined condition \"nerve problem\"  Pancreatic adenocarcinoma ([facility]  Syphilis Past Surgical History:  
Procedure Laterality Date  BREAST SURGERY PROCEDURE UNLISTED    
 unsure of side-benign, lumpectomy  COLONOSCOPY N/A 9/27/2016 COLONOSCOPY with polypectomy. performed by Amandeep Tuttle MD at [address] [facility] GYN    
 patient unsure of surgery History reviewed. No pertinent family history. Social History Socioeconomic History  Marital status: SINGLE Spouse name: Not on file  Number of children: Not on file  Years of education: Not on file  Highest education level: Not on file Tobacco Use  Smoking status: Never Smoker  Smokeless tobacco: Never Used Substance and Sexual Activity  Alcohol use: No  
  Frequency: Never  Drug use: No  
Social History Narrative  ** Merged History Encounter **  
    
 
 
 Prior to Admission medications Medication Sig Start Date End Date Taking? Authorizing Provider  
cholecalciferol (VITAMIN D3) 1,000 unit tablet Take 5,000 Units by mouth daily. Yes Other, MD Louise  
potassium chloride SR (KLOR-CON 10) 10 mEq tablet Take  by mouth. Yes Provider, Historical  
busPIRone (BUSPAR) 7.5 mg tablet TAKE 1 TABLET BY MOUTH THREE TIMES A DAY 6/12/19  Yes Slim Fuentes MD  
metoclopramide HCl (REGLAN) 10 mg tablet Take 5 mg by mouth Before breakfast, lunch, dinner and at bedtime. Yes Provider, Historical  
Omeprazole delayed release (PRILOSEC D/R) 20 mg tablet Take 2 Tabs by mouth daily. 5/6/19  Yes Slim Castro MD  
JARDIANCE 10 mg tablet Take 1 Tab by mouth daily. 5/6/19  Yes Jazmyne Mehta MD  
 
 
No Known Allergies Review of Systems Pertinent items are noted in the History of Present Illness. Physical Exam:  
  
Visit Vitals /80 (BP 1 Location: Right arm) Pulse (!) 117 Temp 97.9 °F (36.6 °C) Resp 18 Ht 4' 5\" (1.346 m) Wt 62.1 kg (137 lb) SpO2 100% Breastfeeding? No  
BMI 34.29 kg/m² Physical Exam: 
Constitutional: Alert. Cooperative. A&Ox3. Jaundiced. HEENT: +Scleral icterus Respiratory: Normal respiratory effort. Cardiovascular: Reuglar rate. Gastrointestinal: Soft, non-tender, non-distened Labs Reviewed: 
CMP:  
Lab Results Component Value Date/Time  07/31/2019 02:45 AM  
 K 3.7 07/31/2019 02:45 AM  
  07/31/2019 02:45 AM  
 CO2 25 07/31/2019 02:45 AM  
 AGAP 9 07/31/2019 02:45 AM  
  (H) 07/31/2019 02:45 AM  
 BUN 4 (L) 07/31/2019 02:45 AM  
 CREA 0.52 (L) 07/31/2019 02:45 AM  
 GFRAA >60 07/31/2019 02:45 AM  
 GFRNA >60 07/31/2019 02:45 AM  
 CA 8.8 07/31/2019 02:45 AM  
 ALB 3.0 (L) 07/31/2019 02:45 AM  
 TP 5.3 (L) 07/31/2019 02:45 AM  
 GLOB 2.3 07/31/2019 02:45 AM  
 AGRAT 1.3 07/31/2019 02:45 AM  
 SGOT 40 (H) 07/31/2019 02:45 AM  
 ALT 22 07/31/2019 02:45 AM  
 
CBC:  
Lab Results Component Value Date/Time WBC 3.8 (L) 07/31/2019 02:45 AM  
 HGB 7.1 (L) 07/31/2019 02:45 AM  
 HCT 20.7 (L) 07/31/2019 02:45 AM  
  (L) 07/31/2019 02:45 AM  
 
COAGS:  
Lab Results Component Value Date/Time APTT 56.7 (H) 07/31/2019 01:58 PM  
 PTP 26.9 (H) 07/31/2019 01:58 PM  
 INR 2.5 (H) 07/31/2019 01:58 PM  
 
Imaging: EXAMINATION: CT abdomen with and without contrast 
  
INDICATION: Abnormal ultrasound, hyperbilirubinemia, history of pancreatic 
cancer, status post multiple. 
  
COMPARISON: MRCP 7/26/2019, ultrasound 7/25/2019, outside CT 7/3/2019 
  
TECHNIQUE: CT of abdomen performed before and after 80 cc IV Isovue-300 with 
multiplanar reformations, as well as multiphasic postcontrast imaging. All CT 
scans at this facility are performed using dose optimization technique as 
appropriate to a performed exam, to include automated exposure control, 
adjustment of the mA and/or kV according to patient size (including appropriate 
matching first site specific examinations), or use of iterative reconstruction 
technique. 
  
FINDINGS: 
  
Lower thorax: Moderate volume right and small volume left pleural effusions with 
associated compressive atelectasis. 
  
Hepatobiliary: Profound steatosis. Vascular appearing nonmasslike stellate-like 
structure in the left hepatic lobe segment 2 noted. No definite parenchymal mass 
in the liver. Gallbladder is probably absent. Small amount of intrahepatic 
pneumobilia centrally and in the left hepatic lobe. No definite biliary duct 
dilatation identified. Status post choledochoduodenostomy. 
  
Pancreas: Pancreatic parenchyma appears atrophic, and there appears to be a 
pancreatic stent through suspected pancreaticojejunostomy. Suspected collapsed 
bowel along the inferior margin of the right hepatic lobe, however some 
ill-defined soft tissue density material is difficult to exclude.  No definable 
pancreatic parenchymal mass. 
  
Spleen: Normal. 
  
 Adrenal glands: Right adrenal gland not well delineated. Left adrenal gland 
unremarkable. 
  
Genitourinary: There is some radiodense material within the renal collecting 
system on precontrast images. No suspicious renal parenchymal lesions or 
hydronephrosis. 
  
Gastrointestinal: Suggestion of partial distal gastrectomy and possibly to sites 
of gastrojejunostomy anastomoses. There is mild gastric wall thickening, and 
multiple segments of small bowel and large bowel wall thickening in the abdomen. No bowel dilatation identified. 
  
Mesentery/vessels/nodes: No free air. Moderate volume of ascites. Patchy 
mesenteric edema/stranding in the abdomen. Major vessels unremarkable. A few 
mildly prominent small bowel mesentery nodes. 
  
Miscellaneous: Extensive body wall edema. 
  
Bones: No acute osseous findings. 
  
IMPRESSION: 
  
No evidence of biliary duct dilatation. Small amount of intrahepatic 
pneumobilia, status post Whipple. Correlate clinically with surgical history. 
-Profound hepatic steatosis. -A nonmasslike stellate-like density in the left hepatic lobe, possibly fibrosis 
or vascular lesion, similar to outside CT from 7/3/2019. Recommend continued 
attention on follow-up imaging. 
  
Pancreatic atrophy with a suspected pancreatic stent through a possible 
pancreatic jejunostomy. Again, recommend correlation with surgical history. Probable collapsed bowel along the inferior margin of the right hepatic lobe, 
however underlying lesion difficult to completely exclude. Recommend attention 
on follow-up imaging. 
-No definite pancreatic mass. 
  
Some radiodense material within the renal collecting systems on precontrast 
imaging.  Recommend correlation with urinalysis. 
  
Areas of gastric, small bowel, and large bowel wall thickening, possibly a 
nonspecific gastroenteritis, or may be related to volume overload in this 
setting. 
-Evidence of anasarca with moderate volume ascites, patchy mesenteric edema, 
right greater than left pleural effusions. 
  
See additional details above. Assessment/Plan Principal Problem: 
  Obstructive hyperbilirubinemia (7/4/2019) Active Problems: Hypokalemia (7/25/2019) Jaundice (7/25/2019) Pancreatic adenocarcinoma ([facility] [address] (11/19/2018) Hypoalbuminemia due to protein-calorie malnutrition ([facility] [address] (7/26/2019) Manuel Zhao is a 61 y.o. female with a history of pancreatic carcinoma s/p whipple, chemotherapy and radiation presenting with abnormal labs and associated hepatic steatosis without dilatation on cross-sectional imaging. Plan Case and images reviewed by Dr. Antonia Akhtar. Given that the cause of abnormal laboratory studies is unknown and her bilirubin is severely elevated (15), will plan for image-guided liver biopsy with moderate sedation as IR schedule allows. Patient to remain NPO with blood thinning medications held. Consent to be obtained prior to procedure.

## 2019-07-31 NOTE — ROUTINE PROCESS
Bedside and Verbal shift change report given to James Hudson RN (oncoming nurse) by Keven Tavarez RN (offgoing nurse). Report included the following information SBAR, Kardex, MAR and Recent Results. SITUATION: 
Code Status: Full Code Reason for Admission: Hypokalemia [E87.6] Jaundice [R17] Hospital day: 6 Problem List:  
   
Hospital Problems  Date Reviewed: 7/17/2019 Codes Class Noted POA Hypoalbuminemia due to protein-calorie malnutrition ([facility] ICD-10-CM: E46 
ICD-9-CM: 263.9  7/26/2019 Yes Hypokalemia ICD-10-CM: E87.6 ICD-9-CM: 276.8  7/25/2019 Yes Jaundice ICD-10-CM: R17 
ICD-9-CM: 782.4  7/25/2019 Yes * (Principal) Obstructive hyperbilirubinemia (Chronic) ICD-10-CM: P16.3 ICD-9-CM: 576.8  7/4/2019 Yes Pancreatic adenocarcinoma (HCC) (Chronic) ICD-10-CM: C25.9 ICD-9-CM: 157.9  11/19/2018 Yes BACKGROUND: 
 Past Medical History:  
Past Medical History:  
Diagnosis Date  Asthma  Cancer ([facility] 11/13/2018 Pancreatic cancer  Diabetes ([facility]  Hypertension  Hypoalbuminemia due to protein-calorie malnutrition ([facility] 7/26/2019  Ill-defined condition \"nerve problem\"  Pancreatic adenocarcinoma ([facility]  Syphilis Patient taking anticoagulants no Patient has a defibrillator: no  
 History of shots YES for example, flu, pneumonia, tetanus Isolation History NO for example, MRSA, CDiff ASSESSMENT: 
Changes in Assessment Throughout Shift: NONE Significant Changes in 24 hours (for example, RR/code, fall) Patient has Central Line: yes Reasons if yes: Irritant/vesicant Patient has Vasquez Cath: no Mobility Issues PT 
IV Patency OR Checklist 
Pending Tests Last Vitals: 
Vitals w/ MEWS Score (last day) Date/Time MEWS Score Pulse Resp Temp BP Level of Consciousness SpO2  
 07/31/19 0346  2  (!) 106  18  98 °F (36.7 °C)  129/86  Alert  98 % 07/30/19 2335  2  (!) 102  18  97.8 °F (36.6 °C)  126/75  Alert  98 % 07/30/19 1928  1  99  18  97.9 °F (36.6 °C)  147/83  Alert  97 % 07/30/19 1117  1  95  18  97.8 °F (36.6 °C)  105/71  Alert  96 % 07/30/19 0800  2  (!) 109  18  97.9 °F (36.6 °C)  124/78  Alert  97 % 07/30/19 0443  2  94  18  97.8 °F (36.6 °C)  96/59  Alert  97 % 07/30/19 0051  1  99  18  97.4 °F (36.3 °C)  116/68  Alert  97 % PAIN Pain Assessment Pain Intensity 1: 0 (07/31/19 0346) Pain Intervention(s) 1: Medication (see MAR)(given by Calvin Troncoso RN) Patient Stated Pain Goal: 0 Intervention effective: N/A Time of last intervention: N/A Reassessment Completed: yes Other actions taken for pain: Distraction Last 3 Weights: 
Last 3 Recorded Weights in this Encounter  
 07/25/19 1141 07/25/19 2126 07/26/19 0405 Weight: 59 kg (130 lb) 62.4 kg (137 lb 8 oz) 62.1 kg (137 lb) Weight change: INTAKE/OUPUT Current Shift: No intake/output data recorded. Last three shifts: 07/29 1901 - 07/31 0700 In: 899.2 [P.O.:240; I.V.:659.2] Out: 2250 [Urine:2250] RECOMMENDATIONS AND DISCHARGE PLANNING Patient needs and requests: Assistance with ADL's 
 
Pending tests/procedures: labs Discharge plan for patient: Home Discharge planning Needs or Barriers: none Estimated Discharge Date: 7/31/2019 Posted on Whiteboard in Patients Room: no   
  
\"HEALS\" SAFETY CHECK A safety check occurred in the patient's room between off going nurse and oncoming nurse listed above. The safety check included the below items: 
 
H High Alert Medications Verify all high alert medication drips (heparin, PCA, etc.) E Equipment Suction is set up for ALL patients (with yanker) Red plugs utilized for all equipment (IV pumps, etc.) WOWs wiped down at end of shift. Room stocked with oxygen, suction, and other unit-specific supplies A Alarms Bed alarm is set for fall risk patients Ensure chair alarm is in place and activated if patient is up in a chair L 
 Lines Check IV for any infiltration Vasquez bag is empty if patient has a Vasquez Tubing and IV bags are labeled Tonny Dillard Safety Room is clean, patient is clean, and equipment is clean. Hallways are clear from equipment besides carts. Fall bracelet on for fall risk patients Ensure room is clear and free of clutter Suction is set up for ALL patients (with milly) Hallways are clear from equipment besides carts. Isolation precautions followed, supplies available outside room, sign posted Jeanette Vargas RN

## 2019-07-31 NOTE — PROGRESS NOTES
Discharge planning Reviewed chart. MD monitoring labs and ordering test. CM will continue to monitor for transitional needs. CORINNE KrugerN, RN Pager # [phone] Care Manager

## 2019-07-31 NOTE — PROGRESS NOTES
MEWS 3, paged Dr. Pk Kelly, NAD noted. 12:34 Dr. Pk Kelly aware of ST of 117, and increased MEWS 3. No new orders save monitor HR and call back in approx one hour 1400 Pt HR still  no change in her presentation. Paged Dr. Pk Kelly.

## 2019-07-31 NOTE — PROGRESS NOTES
Nutrition Pt with fair appetite and meal intake; tolerating full liquid diet. Agreeable to nutrition supplements; does not want Ensure Enlive, agreeable to [employer] supplement. Po intake encouraged. Pt progressing towards nutrition goals. Recommendation/Plan: - Monitor GI symptoms and readiness for diet advancement. - Add supplement: Magic Cup TID 
- Encourage meal/nutrition supplement intake - Continue RD inpatient monitoring and evaluation. Krystina Marie RD Pager [phone]

## 2019-07-31 NOTE — CONSULTS
[address] [facility] Tyson Fritz MD, DEION De PazSLD MD Ok Cervantes, PANCHO Hager, [facility] Itzel Milligan, [facility]   [facility] Aylin Sewell, [facility] Romario Deputado Jimenez [address] 
  at [address] 
  [address] [place] [address], [address] [place] [address] Anne Marie Lance  22. [phone] FAX: [address] [place] [address] Avenue 
  [facility] 
  [address], [address] [facility], [address] 
  [phone] FAX: [phone] HEPATOLOGY NOTE I was called and asked to review the medical record of this patient by Dr Alexis Hall. The patient has a marked elevation in TBILI with normal liver transaminases and ALP. The INR is prolonged. The albumin is depressed. Imaging of the liver shows no liver mass, no dialted ducts and nothing to suggest metastic disease to the liver. This is intrahepatic cholestasis, no obstructive jaundice. In the later the ducts would be enlarged and dilated and the ALP would be elevated. Suspect this is drug induced cholestasis with inhibition of bilirubin transport/secretion. The prolongation of the INR could also be due to cholestasis leading to inhibition of vitamin K absorption. Suggest you give IV vitamin K 10 mg now and repeat this QAM x 2 doses (total of 3 doses). If INR corrects than the synthetic function of the liver is intact and the TBILI will resolve to normal.  I will just take some time. If the INR corrects you can do a biopsy but I suspect this would show only cholstasis and no significant liver injury; ie necrosis, inflammation, fibrosis. MD Romario Arnold Deputa Jimenez De Vilchis [address] [place] [address], [address] Anne Marie Lance  22. 
[phone] [facility] [address]

## 2019-08-01 NOTE — ROUTINE PROCESS
Bedside and Verbal shift change report given to Renato Martinez RN (oncoming nurse) by Sharmaine Boas, RN (offgoing nurse). Report included the following information SBAR, Kardex, MAR and Recent Results. SITUATION: 
Code Status: Full Code Reason for Admission: Hypokalemia [E87.6] Jaundice [R17] Hospital day: 7 Problem List:  
   
Hospital Problems  Date Reviewed: 7/17/2019 Codes Class Noted POA Hypoalbuminemia due to protein-calorie malnutrition ([facility] ICD-10-CM: E46 
ICD-9-CM: 263.9  7/26/2019 Yes Hypokalemia ICD-10-CM: E87.6 ICD-9-CM: 276.8  7/25/2019 Yes Jaundice ICD-10-CM: R17 
ICD-9-CM: 782.4  7/25/2019 Yes * (Principal) Obstructive hyperbilirubinemia (Chronic) ICD-10-CM: P13.3 ICD-9-CM: 576.8  7/4/2019 Yes Pancreatic adenocarcinoma (HCC) (Chronic) ICD-10-CM: C25.9 ICD-9-CM: 157.9  11/19/2018 Yes BACKGROUND: 
 Past Medical History:  
Past Medical History:  
Diagnosis Date  Asthma  Cancer ([facility] 11/13/2018 Pancreatic cancer  Diabetes ([facility]  Hypertension  Hypoalbuminemia due to protein-calorie malnutrition ([facility] 7/26/2019  Ill-defined condition \"nerve problem\"  Pancreatic adenocarcinoma ([facility]  Syphilis Patient taking anticoagulants no Patient has a defibrillator: no  
 History of shots YES for example, flu, pneumonia, tetanus Isolation History NO for example, MRSA, CDiff ASSESSMENT: 
Changes in Assessment Throughout Shift: NONE Significant Changes in 24 hours (for example, RR/code, fall) Patient has Central Line: yes Reasons if yes: Irritant/vesicant Patient has Vasquez Cath: no Mobility Issues PT 
IV Patency OR Checklist 
Pending Tests Last Vitals: 
Vitals w/ MEWS Score (last day) Date/Time MEWS Score Pulse Resp Temp BP Level of Consciousness SpO2  
 08/01/19 0426  2  (!) 106  20  97.8 °F (36.6 °C)  107/66  Alert  97 % 07/31/19 2323  2  (!) 108  18  98 °F (36.7 °C)  111/71  Alert  98 % 07/31/19 1957  2  (!) 105  18  98.3 °F (36.8 °C)  147/80  Alert  100 % 07/31/19 1731  2  (!) 110  18  98.2 °F (36.8 °C)  103/66  Alert  98 % 07/31/19 1210  3  (!) 117  18  97.9 °F (36.6 °C)  110/80  Alert  100 % 07/31/19 0855  2  (!) 107  18  97.8 °F (36.6 °C)  (!) 114/93  Alert  98 % 07/31/19 0346  2  (!) 106  18  98 °F (36.7 °C)  129/86  Alert  98 % PAIN Pain Assessment Pain Intensity 1: 0 (08/01/19 0338) Pain Intervention(s) 1: Medication (see MAR)(given by Ann Quezada RN) Patient Stated Pain Goal: 0 Intervention effective: N/A Time of last intervention: N/A Reassessment Completed: yes Other actions taken for pain: Distraction Last 3 Weights: 
Last 3 Recorded Weights in this Encounter  
 07/25/19 1141 07/25/19 2126 07/26/19 0405 Weight: 59 kg (130 lb) 62.4 kg (137 lb 8 oz) 62.1 kg (137 lb) Weight change: INTAKE/OUPUT Current Shift: No intake/output data recorded. Last three shifts: 07/30 1901 - 08/01 0700 In: 2635.8 [P.O.:1430; I.V.:1205.8] Out: 2350 [Urine:2350] RECOMMENDATIONS AND DISCHARGE PLANNING Patient needs and requests: Assistance with ADL's 
 
Pending tests/procedures: labs Discharge plan for patient: Home Discharge planning Needs or Barriers: none Estimated Discharge Date: 7/31/2019 Posted on Whiteboard in Patients Room: no   
  
\"HEALS\" SAFETY CHECK A safety check occurred in the patient's room between off going nurse and oncoming nurse listed above. The safety check included the below items: 
 
H High Alert Medications Verify all high alert medication drips (heparin, PCA, etc.) E Equipment Suction is set up for ALL patients (with yanker) Red plugs utilized for all equipment (IV pumps, etc.) WOWs wiped down at end of shift. Room stocked with oxygen, suction, and other unit-specific supplies A Alarms Bed alarm is set for fall risk patients Ensure chair alarm is in place and activated if patient is up in a chair L 
 Lines Check IV for any infiltration Vasquez bag is empty if patient has a Vasquez Tubing and IV bags are labeled Leonid Isidro Safety Room is clean, patient is clean, and equipment is clean. Hallways are clear from equipment besides carts. Fall bracelet on for fall risk patients Ensure room is clear and free of clutter Suction is set up for ALL patients (with milly) Hallways are clear from equipment besides carts. Isolation precautions followed, supplies available outside room, sign posted Moshe Santo RN

## 2019-08-01 NOTE — PROGRESS NOTES
[facility] Progress Note Patient: Wenceslao Tobin Age: 61 y.o. : 1955 MR#: [id number] SSN: [id number] Date/Time: 2019 Subjective:  
 
Pt admitted to hosp for elevated Bilirubin & obstructive jaundice in the setting of H/o Pancreatic carcinoma Assessment/Plan: 1. Obstructive jaundice with hyperbilirubinemia - Has had whipple's procedure done -  GI consulted , MRCP report reviewed - CT negative for obstruction 2. Hepatology note reviewed - Vit K 10mg x 3 for 3 days - check INR - can do liver bx if INR improves , will follow 3. H/o Pancreatic cancer - start creon when able to tolerate - Oncology on board  - CTA chest done 2y to Tachycardia - negative 4. Chronic anemia - monitor H&H   
5. Mild elevation in LFT's - monitor 6. Tachycardia - Cardiology consult in AM , Echo ordered, will follow DVT px - elevated INR  
FC  
  
T bili still elevated , GI & Oncology on board - will f/u with them Case discussed with:  [x]Patient  []Family  []Nursing  []Case Management DVT Prophylaxis:  []Lovenox  []Hep SQ  []SCDs  []Coumadin   []On Heparin gtt Objective:  
VS:  
Visit Vitals /80 (BP 1 Location: Right arm, BP Patient Position: At rest;Head of bed elevated (Comment degrees)) Pulse (!) 105 Temp 98.3 °F (36.8 °C) Resp 18 Ht 4' 5\" (1.346 m) Comment: per patient Wt 62.1 kg (137 lb) SpO2 100% Breastfeeding? No  
BMI 34.29 kg/m² Tmax/24hrs: Temp (24hrs), Av °F (36.7 °C), Min:97.8 °F (36.6 °C), Max:98.3 °F (36.8 °C) IOBRIEF Intake/Output Summary (Last 24 hours) at 2019 2240 Last data filed at 2019 2151 Gross per 24 hour Intake 2505.84 ml Output 1800 ml Net 705.84 ml General:  Alert, cooperative, no acute distress HEENT: PERRLA, icteric sclera & skin . Pulmonary:  CTA Bilaterally. No Wheezing/Rhonchi/Rales. Cardiovascular: Regular rate and Rhythm. GI:  Soft, Non distended, Non tender. + Bowel sounds. Extremities:  No edema, cyanosis, clubbing. No calf tenderness. Neurologic: Alert and oriented X 3. No acute neuro deficits. Additional: 
 
Medications:  
Current Facility-Administered Medications Medication Dose Route Frequency  morphine injection 1 mg  1 mg IntraVENous Q8H PRN  
 dextrose 5% infusion  50 mL/hr IntraVENous CONTINUOUS  
 cefepime (MAXIPIME) 2 g in sterile water (preservative free) 10 mL IV syringe  2 g IntraVENous Q12H  
 sodium chloride (NS) flush 5-10 mL  5-10 mL IntraVENous PRN  
 busPIRone (BUSPAR) tablet 10 mg  10 mg Oral TID  pantoprazole (PROTONIX) 40 mg in sodium chloride 0.9% 10 mL injection  40 mg IntraVENous Q12H  
 insulin lispro (HUMALOG) injection   SubCUTAneous AC&HS  
 glucose chewable tablet 16 g  4 Tab Oral PRN  
 glucagon (GLUCAGEN) injection 1 mg  1 mg IntraMUSCular PRN  
 dextrose 10% infusion 125-250 mL  125-250 mL IntraVENous PRN  
 furosemide (LASIX) injection 40 mg  40 mg IntraVENous PRN Labs:   
Recent Results (from the past 24 hour(s)) METABOLIC PANEL, COMPREHENSIVE Collection Time: 07/31/19  2:45 AM  
Result Value Ref Range Sodium 143 136 - 145 mmol/L Potassium 3.7 3.5 - 5.5 mmol/L Chloride 109 100 - 111 mmol/L  
 CO2 25 21 - 32 mmol/L Anion gap 9 3.0 - 18 mmol/L Glucose 153 (H) 74 - 99 mg/dL BUN 4 (L) 7.0 - 18 MG/DL Creatinine 0.52 (L) 0.6 - 1.3 MG/DL  
 BUN/Creatinine ratio 8 (L) 12 - 20 GFR est AA >60 >60 ml/min/1.73m2 GFR est non-AA >60 >60 ml/min/1.73m2 Calcium 8.8 8.5 - 10.1 MG/DL Bilirubin, total 15.3 (H) 0.2 - 1.0 MG/DL  
 ALT (SGPT) 22 13 - 56 U/L  
 AST (SGOT) 40 (H) 10 - 38 U/L Alk. phosphatase 87 45 - 117 U/L Protein, total 5.3 (L) 6.4 - 8.2 g/dL Albumin 3.0 (L) 3.4 - 5.0 g/dL Globulin 2.3 2.0 - 4.0 g/dL A-G Ratio 1.3 0.8 - 1.7    
CBC W/O DIFF Collection Time: 07/31/19  2:45 AM  
Result Value Ref Range WBC 3.8 (L) 4.6 - 13.2 K/uL  
 RBC 2.00 (L) 4.20 - 5.30 M/uL HGB 7.1 (L) 12.0 - 16.0 g/dL HCT 20.7 (L) 35.0 - 45.0 % .5 (H) 74.0 - 97.0 FL  
 MCH 35.5 (H) 24.0 - 34.0 PG  
 MCHC 34.3 31.0 - 37.0 g/dL  
 RDW 17.1 (H) 11.6 - 14.5 % PLATELET 891 (L) 012 - 420 K/uL MPV 10.4 9.2 - 11.8 FL  
GLUCOSE, POC Collection Time: 07/31/19  9:02 AM  
Result Value Ref Range Glucose (POC) 163 (H) 70 - 110 mg/dL GLUCOSE, POC Collection Time: 07/31/19 12:09 PM  
Result Value Ref Range Glucose (POC) 171 (H) 70 - 110 mg/dL PROTHROMBIN TIME + INR Collection Time: 07/31/19  1:58 PM  
Result Value Ref Range Prothrombin time 26.9 (H) 11.5 - 15.2 sec INR 2.5 (H) 0.8 - 1.2 PTT Collection Time: 07/31/19  1:58 PM  
Result Value Ref Range aPTT 56.7 (H) 23.0 - 36.4 SEC GLUCOSE, POC Collection Time: 07/31/19  5:29 PM  
Result Value Ref Range Glucose (POC) 167 (H) 70 - 110 mg/dL GLUCOSE, POC Collection Time: 07/31/19  9:46 PM  
Result Value Ref Range Glucose (POC) 201 (H) 70 - 110 mg/dL Signed By: Charles Banks MD   
 July 31, 2019

## 2019-08-01 NOTE — CONSULTS
[facility] - Consult Note Date of  Admission: 7/25/2019 11:48 AM 
  
Primary Care Physician:  Micky Breen MD 
 
 Plan: 1. Sinus tachycardia- with 's multifactorial with severe anemia, hypotension, hypoalbuminemia, advanced Cancer, deconditioning.- r/o infection. has normal TSH. Treat underlying cause. Recent echo showed hyperdynamic systolic function with EF 65%-70% 2. Low BP- consider albumin boluses. 3. Pancreatic adenocarcinoma s/p Whipple procedure 4/2019 with adjuvant radiation. SNG 
4. Hyperbilirubinemia with jaundice -GI following 5. Severe Anemia-no active bleeding. Transfuse as needed. monitor H&H 6. Hypokalemia- replete as needed follow mag level 7. Diabetes- medications per medical team  
8. Hx anxiety/depression- medicine following 9. Deconditioning- non ambulatory 10. Hypoalbuminemia-with BLE edema likely third spacing. Lasix as needed if BP tolerates it. 11. Coagulopathy- was given Vitamin K iv. S/p liver biopsy 8/1/19   
12. Edema-likely related to third spacing from multiple problems. 07/25/19 ECHO ADULT COMPLETE 08/01/2019 8/1/2019 Narrative · Left Ventricle: Normal cavity size. Mild concentric hypertrophy. Hyperdynamic systolic dysfunction. Estimated left ventricular ejection  
fraction is 66 - 70%. Biplane method used to measure ejection fraction. No  
regional wall motion abnormality noted. Age-appropriate left ventricular  
diastolic function. Signed by: Mavis Botello MD  
 
 
  
  
 
XR Results (most recent): 
[place] [facility] [place]25[place]19 XR CHEST SNGL V  
 Narrative EXAM:  XR CHEST SNGL V 
 
INDICATION:   fatigue COMPARISON: None. FINDINGS: 
Right jugular MediPort with the tip projects at the [facility]. The cardiac and 
mediastinal silhouette are within normal limits. Pulmonary vasculature is within 
normal limits. No pneumothorax or pleural effusions. No air space opacity.  No 
 acute osseous abnormality. Impression Impression: No radiographic evidence of acute cardiopulmonary process. Assessment:  
 
Hospital Problems  Date Reviewed: 7/17/2019 Codes Class Noted POA Hypoalbuminemia due to protein-calorie malnutrition ([facility] ICD-10-CM: E46 
ICD-9-CM: 263.9  7/26/2019 Yes Hypokalemia ICD-10-CM: E87.6 ICD-9-CM: 276.8  7/25/2019 Yes Jaundice ICD-10-CM: R17 
ICD-9-CM: 782.4  7/25/2019 Yes * (Principal) Obstructive hyperbilirubinemia (Chronic) ICD-10-CM: P00.3 ICD-9-CM: 576.8  7/4/2019 Yes Pancreatic adenocarcinoma (HCC) (Chronic) ICD-10-CM: C25.9 ICD-9-CM: 157.9  11/19/2018 Yes History of Present Illness: This is a 61 y.o. female admitted for Hypokalemia [E87.6] Jaundice [R17]. Patient with PMHx of Pancreatic Cancer, hypertension, Asthma, diabetes. Admitted on 7/25/19 due to abnormal lab values. She was sent by Dr Nilesh Arshad office to [facility] In the ER she was jaundiced and globally weak with pancytopenia, elevated bilirubin 12.1 with elevated LFT's, K+ 2.7, albumin 1.4, Ca++ 7.9 and lactic acid 2.82. EKG was NSR 97. She was hypotensive 87/53. The patient was recently  Diagnosed with  pancreatic adenocarcinoma with Whipple procedures followed by adjuvant chemotherapy and radiation therapy. Patient has been progressively weakening and now can walk with a Rollator but is becoming increasingly more difficult. Patient has been having increasing jaundice as well as edema. Patient is compliant with medications and lives with his sister-in-law. Patient denies any chest pain or chest pressure. No palpitations. No SOB. Has BLE edema. C/o leg pain b/l. C/o increasing in weakness. Past Medical History:  
 
Past Medical History:  
Diagnosis Date  Asthma  Cancer ([facility] 11/13/2018 Pancreatic cancer  Diabetes ([facility]  Hypertension  Hypoalbuminemia due to protein-calorie malnutrition ([facility] 7/26/2019  Ill-defined condition \"nerve problem\"  Pancreatic adenocarcinoma ([facility]  Syphilis Social History:  
 
Social History Socioeconomic History  Marital status: SINGLE Spouse name: Not on file  Number of children: Not on file  Years of education: Not on file  Highest education level: Not on file Tobacco Use  Smoking status: Never Smoker  Smokeless tobacco: Never Used Substance and Sexual Activity  Alcohol use: No  
  Frequency: Never  Drug use: No  
Social History Narrative ** Merged History Encounter ** Family History:  
History reviewed. No pertinent family history. Medications:  
No Known Allergies Current Facility-Administered Medications Medication Dose Route Frequency  phytonadione (vitamin K1) (AQUA-MEPHYTON) 10 mg in 0.9% sodium chloride 50 mL IVPB  10 mg IntraVENous DAILY  acetaminophen (TYLENOL) tablet 500 mg  500 mg Oral PRN  
 diphenhydrAMINE (BENADRYL) capsule 25 mg  25 mg Oral PRN  
 gelatin adsorbable (GELFOAM) 12-7 mm sponge  fentaNYL citrate (PF) 50 mcg/mL injection  midazolam (VERSED) 1 mg/mL injection  morphine injection 1 mg  1 mg IntraVENous Q8H PRN  
 dextrose 5% infusion  50 mL/hr IntraVENous CONTINUOUS  
 cefepime (MAXIPIME) 2 g in sterile water (preservative free) 10 mL IV syringe  2 g IntraVENous Q12H  
 sodium chloride (NS) flush 5-10 mL  5-10 mL IntraVENous PRN  
 busPIRone (BUSPAR) tablet 10 mg  10 mg Oral TID  pantoprazole (PROTONIX) 40 mg in sodium chloride 0.9% 10 mL injection  40 mg IntraVENous Q12H  
 insulin lispro (HUMALOG) injection   SubCUTAneous AC&HS  
 glucose chewable tablet 16 g  4 Tab Oral PRN  
 glucagon (GLUCAGEN) injection 1 mg  1 mg IntraMUSCular PRN  
 dextrose 10% infusion 125-250 mL  125-250 mL IntraVENous PRN  
 furosemide (LASIX) injection 40 mg  40 mg IntraVENous PRN  
  
 
 Review Of Systems:  
 
 
 
Constitutional: generalized weakness HEENT: No epistaxis, no nasal drainage, no difficulty in swallowing, no redness in eyes Respiratory:  negative for cough, sputum, hemoptysis, pleurisy/chest pain, wheezing, dyspnea on exertion or emphysema Cardiovascular:  chronic leg edema Gastrointestinal: Diarrhea, 
Genitourinary: No urinary symptoms or hematuria Integument/breast: No ulcers or rashes Musculoskeletal: no muscle pain, no weakness Neurological: No focal weakness, no seizures, no headaches Behvioral/Psych:  depression Physical Exam:  
 
Visit Vitals /66 Pulse (!) 120 Temp 98.1 °F (36.7 °C) Resp (!) 2 Ht 4' 6\" (1.372 m) Wt 62.1 kg (137 lb) SpO2 96% Breastfeeding? No  
BMI 33.03 kg/m² BP Readings from Last 3 Encounters:  
08/01/19 107/66  
07/17/19 (!) 82/53  
05/06/19 (!) 93/41 Pulse Readings from Last 3 Encounters:  
08/01/19 (!) 120  
07/17/19 86  
05/06/19 94 Wt Readings from Last 3 Encounters:  
08/01/19 62.1 kg (137 lb)  
07/17/19 59 kg (130 lb) 05/06/19 53 kg (116 lb 12.8 oz) General:  alert, cooperative, no distress, appears stated age, icteric Skin: Warm and dry, acyanotic, jaundice is present Head: Normocephalic, atraumatic. Eyes: Sclerae anicteric, conjunctivae without injection. Neck:  nontender, no nuchal rigidity, no masses, no stridor, no carotid bruit, no JVD Lungs:  clear to auscultation bilaterally. Heart:  regular rate and rhythm, no S3 or S4, no click, no rub Abdomen:  abdomen is soft without significant tenderness, masses, organomegaly or guarding Extremities:  extremities normal, atraumatic, no cyanosis or edema. Peripheral pulses Neurological: grossly intact. No focal abnormalities, moves all extremities well. Psychiatric Affect: The patient is awake, alert and oriented x2 Data Review:  
 
Recent Results (from the past 48 hour(s)) GLUCOSE, POC  Collection Time: 07/30/19  6:05 PM  
 Result Value Ref Range Glucose (POC) 118 (H) 70 - 110 mg/dL GLUCOSE, POC Collection Time: 07/30/19  9:07 PM  
Result Value Ref Range Glucose (POC) 151 (H) 70 - 110 mg/dL METABOLIC PANEL, COMPREHENSIVE Collection Time: 07/31/19  2:45 AM  
Result Value Ref Range Sodium 143 136 - 145 mmol/L Potassium 3.7 3.5 - 5.5 mmol/L Chloride 109 100 - 111 mmol/L  
 CO2 25 21 - 32 mmol/L Anion gap 9 3.0 - 18 mmol/L Glucose 153 (H) 74 - 99 mg/dL BUN 4 (L) 7.0 - 18 MG/DL Creatinine 0.52 (L) 0.6 - 1.3 MG/DL  
 BUN/Creatinine ratio 8 (L) 12 - 20 GFR est AA >60 >60 ml/min/1.73m2 GFR est non-AA >60 >60 ml/min/1.73m2 Calcium 8.8 8.5 - 10.1 MG/DL Bilirubin, total 15.3 (H) 0.2 - 1.0 MG/DL  
 ALT (SGPT) 22 13 - 56 U/L  
 AST (SGOT) 40 (H) 10 - 38 U/L Alk. phosphatase 87 45 - 117 U/L Protein, total 5.3 (L) 6.4 - 8.2 g/dL Albumin 3.0 (L) 3.4 - 5.0 g/dL Globulin 2.3 2.0 - 4.0 g/dL A-G Ratio 1.3 0.8 - 1.7    
CBC W/O DIFF Collection Time: 07/31/19  2:45 AM  
Result Value Ref Range WBC 3.8 (L) 4.6 - 13.2 K/uL  
 RBC 2.00 (L) 4.20 - 5.30 M/uL HGB 7.1 (L) 12.0 - 16.0 g/dL HCT 20.7 (L) 35.0 - 45.0 % .5 (H) 74.0 - 97.0 FL  
 MCH 35.5 (H) 24.0 - 34.0 PG  
 MCHC 34.3 31.0 - 37.0 g/dL  
 RDW 17.1 (H) 11.6 - 14.5 % PLATELET 658 (L) 618 - 420 K/uL MPV 10.4 9.2 - 11.8 FL  
GLUCOSE, POC Collection Time: 07/31/19  9:02 AM  
Result Value Ref Range Glucose (POC) 163 (H) 70 - 110 mg/dL GLUCOSE, POC Collection Time: 07/31/19 12:09 PM  
Result Value Ref Range Glucose (POC) 171 (H) 70 - 110 mg/dL PROTHROMBIN TIME + INR Collection Time: 07/31/19  1:58 PM  
Result Value Ref Range Prothrombin time 26.9 (H) 11.5 - 15.2 sec INR 2.5 (H) 0.8 - 1.2 PTT Collection Time: 07/31/19  1:58 PM  
Result Value Ref Range aPTT 56.7 (H) 23.0 - 36.4 SEC GLUCOSE, POC Collection Time: 07/31/19  5:29 PM  
Result Value Ref Range Glucose (POC) 167 (H) 70 - 110 mg/dL GLUCOSE, POC Collection Time: 07/31/19  9:46 PM  
Result Value Ref Range Glucose (POC) 201 (H) 70 - 110 mg/dL METABOLIC PANEL, COMPREHENSIVE Collection Time: 08/01/19  5:45 AM  
Result Value Ref Range Sodium 140 136 - 145 mmol/L Potassium 3.1 (L) 3.5 - 5.5 mmol/L Chloride 106 100 - 111 mmol/L  
 CO2 28 21 - 32 mmol/L Anion gap 6 3.0 - 18 mmol/L Glucose 123 (H) 74 - 99 mg/dL BUN 3 (L) 7.0 - 18 MG/DL Creatinine 0.58 (L) 0.6 - 1.3 MG/DL  
 BUN/Creatinine ratio 5 (L) 12 - 20 GFR est AA >60 >60 ml/min/1.73m2 GFR est non-AA >60 >60 ml/min/1.73m2 Calcium 8.2 (L) 8.5 - 10.1 MG/DL Bilirubin, total 14.3 (H) 0.2 - 1.0 MG/DL  
 ALT (SGPT) 23 13 - 56 U/L  
 AST (SGOT) 41 (H) 10 - 38 U/L Alk. phosphatase 86 45 - 117 U/L Protein, total 4.9 (L) 6.4 - 8.2 g/dL Albumin 2.6 (L) 3.4 - 5.0 g/dL Globulin 2.3 2.0 - 4.0 g/dL A-G Ratio 1.1 0.8 - 1.7 PTT Collection Time: 08/01/19  5:45 AM  
Result Value Ref Range aPTT 63.5 (H) 23.0 - 36.4 SEC PROTHROMBIN TIME + INR Collection Time: 08/01/19  5:45 AM  
Result Value Ref Range Prothrombin time 27.6 (H) 11.5 - 15.2 sec INR 2.6 (H) 0.8 - 1.2 FIBRINOGEN Collection Time: 08/01/19  5:45 AM  
Result Value Ref Range Fibrinogen 102 (L) 210 - 451 mg/dL CBC WITH AUTOMATED DIFF Collection Time: 08/01/19  5:45 AM  
Result Value Ref Range WBC 3.5 (L) 4.6 - 13.2 K/uL  
 RBC 2.01 (L) 4.20 - 5.30 M/uL HGB 7.1 (L) 12.0 - 16.0 g/dL HCT 20.4 (L) 35.0 - 45.0 % .5 (H) 74.0 - 97.0 FL  
 MCH 35.3 (H) 24.0 - 34.0 PG  
 MCHC 34.8 31.0 - 37.0 g/dL  
 RDW 16.6 (H) 11.6 - 14.5 % PLATELET 942 (L) 115 - 420 K/uL MPV 10.1 9.2 - 11.8 FL  
 NEUTROPHILS 60 40 - 73 % LYMPHOCYTES 20 (L) 21 - 52 % MONOCYTES 18 (H) 3 - 10 % EOSINOPHILS 2 0 - 5 % BASOPHILS 0 0 - 2 %  
 ABS. NEUTROPHILS 2.1 1.8 - 8.0 K/UL  
 ABS. LYMPHOCYTES 0.7 (L) 0.9 - 3.6 K/UL ABS. MONOCYTES 0.6 0.05 - 1.2 K/UL  
 ABS. EOSINOPHILS 0.1 0.0 - 0.4 K/UL  
 ABS. BASOPHILS 0.0 0.0 - 0.1 K/UL  
 DF AUTOMATED    
GLUCOSE, POC Collection Time: 08/01/19  8:30 AM  
Result Value Ref Range Glucose (POC) 134 (H) 70 - 110 mg/dL ECHO ADULT COMPLETE Collection Time: 08/01/19 10:20 AM  
Result Value Ref Range LA Volume 26.65 22 - 52 mL  
 LV E' Lateral Velocity 9.28 cm/s LV E' Septal Velocity 7.40 cm/s Tapse 2.09 (A) 1.5 - 2.0 cm Ao Root D 2.52 cm  
 LA Vol Index 18.10 16 - 28 ml/m2 PASP 35.0 mmHg LVIDd 3.84 (A) 3.9 - 5.3 cm  
 LVPWd 0.72 0.6 - 0.9 cm LVIDs 2.26 cm IVSd 0.78 0.6 - 0.9 cm  
 LV ED Vol A2C 47.2 mL  
 LV ES Vol A4C 7.3 mL  
 LV ES Vol BP 8.5 (A) 19 - 49 mL  
 LVOT d 1.64 cm  
 LVOT Peak Velocity 90.15 cm/s LVOT Peak Gradient 3.3 mmHg LVOT VTI 16.65 cm  
 MV A Kanu 71.16 cm/s  
 MV E Kanu 63.71 cm/s  
 MV E/A 0.90 BP EF 81.6 55 - 100 % LV Ejection Fraction MOD 4C 83 % LV Ejection Fraction MOD 2C 79 % LA Vol 4C 19.41 (A) 22 - 52 mL  
 LA Vol 2C 32.23 22 - 52 mL  
 LA Area 4C 10.5 cm2 LV Mass AL 86.5 67 - 162 g  
 LV Mass AL Index 58.7 43 - 95 g/m2 E/E' lateral 6.87   
 E/E' septal 8.61   
 E/E' ratio (averaged) 7.74 LV ES Vol A2C 9.8 mL  
 LVES Vol Index BP 5.8 mL/m2 LV ED Vol A4C 43.7 mL  
 LVED Vol Index BP 31.2 mL/m2 Mitral Valve E Wave Deceleration Time 49.8 ms Triscuspid Valve Regurgitation Peak Gradient 32.8 mmHg LV ED Vol BP 46.0 (A) 56 - 104 ml  
 TR Max Velocity 286.36 cm/s  
 LA Vol Index 21.89 16 - 28 ml/m2 LA Vol Index 13.18 16 - 28 ml/m2 LVED Vol Index A4C 29.7 mL/m2 LVED Vol Index A2C 32.1 mL/m2 LVES Vol Index A4C 5.0 mL/m2 LVES Vol Index A2C 6.7 mL/m2 Intake/Output Summary (Last 24 hours) at 8/1/2019 1213 Last data filed at 8/1/2019 5545 Gross per 24 hour Intake 2055.83 ml Output 400 ml Net 1655.83 ml Cardiographics:  
 
 
EKG Results Procedure 720 Value Units Date/Time EKG, 12 LEAD, INITIAL [[id number]] Collected:  07/25/19 1315 Order Status:  Completed Updated:  07/25/19 1643 Ventricular Rate 97 BPM   
  Atrial Rate 97 BPM   
  P-R Interval 126 ms   
  QRS Duration 68 ms Q-T Interval 334 ms QTC Calculation (Bezet) 424 ms Calculated P Axis 80 degrees Calculated R Axis 61 degrees Calculated T Axis 62 degrees Diagnosis --  
  Normal sinus rhythm Nonspecific T wave abnormality Abnormal ECG No previous ECGs available Confirmed by Saira Murray ([id number]) on 7/25/2019 4:43:42 PM 
  
  
 
07/25/19 ECHO ADULT COMPLETE 08/01/2019 8/1/2019 Narrative · Left Ventricle: Normal cavity size. Mild concentric hypertrophy. Hyperdynamic systolic dysfunction. Estimated left ventricular ejection  
fraction is 66 - 70%. Biplane method used to measure ejection fraction. No  
regional wall motion abnormality noted. Age-appropriate left ventricular  
diastolic function. Signed by: Keven Tavarez MD  
 
 
 
Signed By: David WOODS Phone [phone] August 1, 2019 I have independently evaluated taken history and examined the patient. All relevant labs and testing data's are reviewed. Care plan discussed and updated after review.  
Buck Farmer MD

## 2019-08-01 NOTE — PROGRESS NOTES
Hematology / Oncology Progress Note [address] Patient ID: 
Yue Pack 61 y.o. 
1955 Admit Date: 2019 Assessment:  
 
Principal Problem: 
  Obstructive hyperbilirubinemia (2019) Active Problems: Hypokalemia (2019) Jaundice (2019) Pancreatic adenocarcinoma ([facility] (2018) Hypoalbuminemia due to protein-calorie malnutrition ([facility] (2019) 
 
presumptive cholestasis due to chemo reaction (5FU) coagulopathy Plan:  
 
Track cbc, lytes, coags Appreciate Dr Annamaria Jaramillo review of record and recs Tried po vit K - no effect Will try iv vit K Liver bx proposed if coags improve Subjective:  
Denies bleeding. Denies pain. No nausea Objective:  
 
Visit Vitals /66 (BP 1 Location: Right arm, BP Patient Position: At rest;Head of bed elevated (Comment degrees)) Pulse (!) 106 Temp 97.8 °F (36.6 °C) Resp 20 Ht 4' 5\" (1.346 m) Comment: per patient Wt 62.1 kg (137 lb) SpO2 97% Breastfeeding? No  
BMI 34.29 kg/m² Temp (24hrs), Av °F (36.7 °C), Min:97.8 °F (36.6 °C), Max:98.3 °F (36.8 °C) Intake/Output Summary (Last 24 hours) at 2019 7552 Last data filed at 2019 0285 Gross per 24 hour Intake 2390 ml Output 400 ml Net 1990 ml Review of Systems:  
Constitutional:  No Fever; No chills; No weight loss Skin:  No rash; No itching HEENT:  No changes in vision or hearing Cardiovascular:  No palpitations, chest pain, angina Respiratory:  No shortness of breath, no wheezing, no pleurisy, no cough, no  hemoptysis Gastrointestinal:  No nausea or vomiting; No diarrhea, no dyspepsis Genitourinary:  No dysuria; No increase in urinary frequency Musculoskeletal:  No weakness or muscle pains Endo:  No polyuria; no symptoms of thyroid dysfunction Heme:  No bruising; No bleeding, no adenopathy Neurological:  No Seizures; No focal weakness or sensory changes Psychiatric:  No mood changes; no suicidal ideation Physical Exam: 
General appearance - chronically ill, nad Eyes - pupils equal and reactive, extraocular eye movements intact, icterus Ears - not examined Mouth - mucous membranes moist, pharynx normal without lesions Neck - supple, no significant adenopathy Lymphatics - no palpable lymphadenopathy, no hepatosplenomegaly Chest - clear to auscultation, no wheezes, rales or rhonchi, symmetric air entry Heart - normal rate, regular rhythm, normal S1, S2, no murmurs, rubs, clicks or gallops Abdomen - soft, nontender, nondistended, no masses or organomegaly Back exam - not examined Neurological - alert, oriented, normal speech, no focal findings or movement disorder noted Extremities - peripheral pulses normal, no pedal edema, no clubbing or cyanosis Skin - normal coloration and turgor, no rashes, no suspicious skin lesions noted Labs: 
Basic Metabolic Profile Recent Labs 08/01/19 
9493 07/31/19 
0245 07/30/19 
0410  143 138 CO2 28 25 26 BUN 3* 4* 4* CBC w/Diff Recent Labs 08/01/19 
8550 07/31/19 
0245 07/30/19 
0410 WBC 3.5* 3.8* 3.3* HCT 20.4* 20.7* 21.7* No results for input(s): BANDS, LYMPHOCYTES in the last 72 hours. No lab exists for component: SEGS Hepatic Panel Hepatic Function No results found for: ALBUMIN   
 
Coagulation Recent Labs 08/01/19 
0545 07/31/19 
1358 INR 2.6* 2.5* APTT 63.5* 56.7* Current medications reviewed Gayla Oliveros MD  
[facility] Office [phone]

## 2019-08-01 NOTE — PROGRESS NOTES
[facility] Hospitalist Group Progress Note Patient: Estrada Day Age: 61 y.o. : 1955 MR#: [id number] SSN: [id number] Date/Time: 2019 Subjective:  
 
Pt admitted to hosp for elevated Bilirubin & obstructive jaundice in the setting of H/o Pancreatic carcinoma Assessment/Plan: 1. Obstructive jaundice with hyperbilirubinemia - Has had whipple's procedure done -  GI consulted , MRCP report reviewed - CT negative for obstruction 2. Hepatology note reviewed - Vit K 10mg x 3 for 3 days - Liver bx done by IR today - await results 3. H/o Pancreatic cancer - start creon when able to tolerate - Oncology on board  - CTA chest done 2y to Tachycardia - negative 4. Chronic anemia - monitor H&H   
5. Mild elevation in LFT's - monitor 6. Tachycardia - Cardiology consult in AM , Echo ordered, will follow DVT px - elevated INR  
FC  
  
T bili still elevated , GI & Oncology on board - will f/u with them Case discussed with:  [x]Patient  []Family  []Nursing  []Case Management DVT Prophylaxis:  []Lovenox  []Hep SQ  []SCDs  []Coumadin   []On Heparin gtt Objective:  
VS:  
Visit Vitals BP 93/54 (BP 1 Location: Left arm, BP Patient Position: At rest) Pulse (!) 109 Temp 98.1 °F (36.7 °C) Resp 21 Ht 4' 6\" (1.372 m) Wt 62.1 kg (137 lb) SpO2 98% Breastfeeding? No  
BMI 33.03 kg/m² Tmax/24hrs: Temp (24hrs), Av.1 °F (36.7 °C), Min:97.8 °F (36.6 °C), Max:98.3 °F (36.8 °C) IOBRIEF Intake/Output Summary (Last 24 hours) at 2019 1518 Last data filed at 2019 4045 Gross per 24 hour Intake 1466.66 ml Output 400 ml Net 1066.66 ml General:  Alert, cooperative, no acute distress HEENT: PERRLA, icteric sclera & skin . Pulmonary:  CTA Bilaterally. No Wheezing/Rhonchi/Rales. Cardiovascular: Regular rate and Rhythm. GI:  Soft, Non distended, Non tender. + Bowel sounds. Extremities:  No edema, cyanosis, clubbing. No calf tenderness. Neurologic: Alert and oriented X 3. No acute neuro deficits. Additional: 
 
Medications:  
Current Facility-Administered Medications Medication Dose Route Frequency  phytonadione (vitamin K1) (AQUA-MEPHYTON) 10 mg in 0.9% sodium chloride 50 mL IVPB  10 mg IntraVENous DAILY  acetaminophen (TYLENOL) tablet 500 mg  500 mg Oral PRN  
 diphenhydrAMINE (BENADRYL) capsule 25 mg  25 mg Oral PRN  
 sodium chloride (NS) flush 5-40 mL  5-40 mL IntraVENous Q8H  
 sodium chloride (NS) flush 5-40 mL  5-40 mL IntraVENous PRN  
 gelatin adsorbable (GELFOAM) 12-7 mm sponge 1 Each  1 Each Topical PRN  
 flumazenil (ROMAZICON) 0.1 mg/mL injection 0.2 mg  0.2 mg IntraVENous Multiple  naloxone (NARCAN) injection 0.1 mg  0.1 mg IntraVENous Multiple  morphine injection 1 mg  1 mg IntraVENous Q8H PRN  
 dextrose 5% infusion  50 mL/hr IntraVENous CONTINUOUS  
 cefepime (MAXIPIME) 2 g in sterile water (preservative free) 10 mL IV syringe  2 g IntraVENous Q12H  
 sodium chloride (NS) flush 5-10 mL  5-10 mL IntraVENous PRN  
 busPIRone (BUSPAR) tablet 10 mg  10 mg Oral TID  pantoprazole (PROTONIX) 40 mg in sodium chloride 0.9% 10 mL injection  40 mg IntraVENous Q12H  
 insulin lispro (HUMALOG) injection   SubCUTAneous AC&HS  
 glucose chewable tablet 16 g  4 Tab Oral PRN  
 glucagon (GLUCAGEN) injection 1 mg  1 mg IntraMUSCular PRN  
 dextrose 10% infusion 125-250 mL  125-250 mL IntraVENous PRN  
 furosemide (LASIX) injection 40 mg  40 mg IntraVENous PRN Labs:   
Recent Results (from the past 24 hour(s)) GLUCOSE, POC Collection Time: 07/31/19  5:29 PM  
Result Value Ref Range Glucose (POC) 167 (H) 70 - 110 mg/dL GLUCOSE, POC Collection Time: 07/31/19  9:46 PM  
Result Value Ref Range Glucose (POC) 201 (H) 70 - 110 mg/dL METABOLIC PANEL, COMPREHENSIVE Collection Time: 08/01/19  5:45 AM  
Result Value Ref Range Sodium 140 136 - 145 mmol/L Potassium 3.1 (L) 3.5 - 5.5 mmol/L Chloride 106 100 - 111 mmol/L  
 CO2 28 21 - 32 mmol/L Anion gap 6 3.0 - 18 mmol/L Glucose 123 (H) 74 - 99 mg/dL BUN 3 (L) 7.0 - 18 MG/DL Creatinine 0.58 (L) 0.6 - 1.3 MG/DL  
 BUN/Creatinine ratio 5 (L) 12 - 20 GFR est AA >60 >60 ml/min/1.73m2 GFR est non-AA >60 >60 ml/min/1.73m2 Calcium 8.2 (L) 8.5 - 10.1 MG/DL Bilirubin, total 14.3 (H) 0.2 - 1.0 MG/DL  
 ALT (SGPT) 23 13 - 56 U/L  
 AST (SGOT) 41 (H) 10 - 38 U/L Alk. phosphatase 86 45 - 117 U/L Protein, total 4.9 (L) 6.4 - 8.2 g/dL Albumin 2.6 (L) 3.4 - 5.0 g/dL Globulin 2.3 2.0 - 4.0 g/dL A-G Ratio 1.1 0.8 - 1.7 PTT Collection Time: 08/01/19  5:45 AM  
Result Value Ref Range aPTT 63.5 (H) 23.0 - 36.4 SEC PROTHROMBIN TIME + INR Collection Time: 08/01/19  5:45 AM  
Result Value Ref Range Prothrombin time 27.6 (H) 11.5 - 15.2 sec INR 2.6 (H) 0.8 - 1.2 FIBRINOGEN Collection Time: 08/01/19  5:45 AM  
Result Value Ref Range Fibrinogen 102 (L) 210 - 451 mg/dL CBC WITH AUTOMATED DIFF Collection Time: 08/01/19  5:45 AM  
Result Value Ref Range WBC 3.5 (L) 4.6 - 13.2 K/uL  
 RBC 2.01 (L) 4.20 - 5.30 M/uL HGB 7.1 (L) 12.0 - 16.0 g/dL HCT 20.4 (L) 35.0 - 45.0 % .5 (H) 74.0 - 97.0 FL  
 MCH 35.3 (H) 24.0 - 34.0 PG  
 MCHC 34.8 31.0 - 37.0 g/dL  
 RDW 16.6 (H) 11.6 - 14.5 % PLATELET 331 (L) 197 - 420 K/uL MPV 10.1 9.2 - 11.8 FL  
 NEUTROPHILS 60 40 - 73 % LYMPHOCYTES 20 (L) 21 - 52 % MONOCYTES 18 (H) 3 - 10 % EOSINOPHILS 2 0 - 5 % BASOPHILS 0 0 - 2 %  
 ABS. NEUTROPHILS 2.1 1.8 - 8.0 K/UL  
 ABS. LYMPHOCYTES 0.7 (L) 0.9 - 3.6 K/UL  
 ABS. MONOCYTES 0.6 0.05 - 1.2 K/UL  
 ABS. EOSINOPHILS 0.1 0.0 - 0.4 K/UL  
 ABS. BASOPHILS 0.0 0.0 - 0.1 K/UL  
 DF AUTOMATED    
TSH 3RD GENERATION Collection Time: 08/01/19  5:45 AM  
Result Value Ref Range TSH 1.85 0.36 - 3.74 uIU/mL GLUCOSE, POC  
 Collection Time: 08/01/19  8:30 AM  
Result Value Ref Range Glucose (POC) 134 (H) 70 - 110 mg/dL ECHO ADULT COMPLETE Collection Time: 08/01/19 10:20 AM  
Result Value Ref Range LA Volume 26.65 22 - 52 mL  
 LV E' Lateral Velocity 9.28 cm/s LV E' Septal Velocity 7.40 cm/s Tapse 2.09 (A) 1.5 - 2.0 cm Ao Root D 2.52 cm  
 LA Vol Index 18.10 16 - 28 ml/m2 PASP 35.0 mmHg LVIDd 3.84 (A) 3.9 - 5.3 cm  
 LVPWd 0.72 0.6 - 0.9 cm LVIDs 2.26 cm IVSd 0.78 0.6 - 0.9 cm  
 LV ED Vol A2C 47.2 mL  
 LV ES Vol A4C 7.3 mL  
 LV ES Vol BP 8.5 (A) 19 - 49 mL  
 LVOT d 1.64 cm  
 LVOT Peak Velocity 90.15 cm/s LVOT Peak Gradient 3.3 mmHg LVOT VTI 16.65 cm  
 MV A Kanu 71.16 cm/s  
 MV E Kanu 63.71 cm/s  
 MV E/A 0.90 BP EF 81.6 55 - 100 % LV Ejection Fraction MOD 4C 83 % LV Ejection Fraction MOD 2C 79 % LA Vol 4C 19.41 (A) 22 - 52 mL  
 LA Vol 2C 32.23 22 - 52 mL  
 LA Area 4C 10.5 cm2 LV Mass AL 86.5 67 - 162 g  
 LV Mass AL Index 58.7 43 - 95 g/m2 E/E' lateral 6.87   
 E/E' septal 8.61   
 E/E' ratio (averaged) 7.74 LV ES Vol A2C 9.8 mL  
 LVES Vol Index BP 5.8 mL/m2 LV ED Vol A4C 43.7 mL  
 LVED Vol Index BP 31.2 mL/m2 Mitral Valve E Wave Deceleration Time 49.8 ms Triscuspid Valve Regurgitation Peak Gradient 32.8 mmHg LV ED Vol BP 46.0 (A) 56 - 104 ml  
 TR Max Velocity 286.36 cm/s  
 LA Vol Index 21.89 16 - 28 ml/m2 LA Vol Index 13.18 16 - 28 ml/m2 LVED Vol Index A4C 29.7 mL/m2 LVED Vol Index A2C 32.1 mL/m2 LVES Vol Index A4C 5.0 mL/m2 LVES Vol Index A2C 6.7 mL/m2 Signed By: Tabatha Webb MD   
 August 1, 2019

## 2019-08-01 NOTE — PROGRESS NOTES
TRANSFER - OUT REPORT: 
 
Verbal report given to HETAL Alva(name) on Estrada Battles  being transferred to (unit) for routine post - op Report consisted of patients Situation, Background, Assessment and  
Recommendations(SBAR). Information from the following report(s) SBAR, Kardex and MAR was reviewed with the receiving nurse. Lines:  
Venous Access Device 07/25/19 Upper chest (subclavicular area, right (Active) Central Line Being Utilized Yes 7/31/2019  9:51 PM  
Criteria for Appropriate Use Irritant/vesicant medication 7/31/2019  9:51 PM  
Site Assessment Clean, dry, & intact 7/31/2019  9:51 PM  
Date of Last Dressing Change 07/27/19 7/31/2019  9:51 PM  
Dressing Status Clean, dry, & intact 7/31/2019  9:51 PM  
Dressing Type Disk with Chlorhexadine gluconate (CHG); Transparent 7/31/2019  9:51 PM  
Action Taken Blood drawn 8/1/2019  5:45 AM  
Date Accessed (Medial Site) 07/25/19 7/26/2019  9:37 PM  
Access Time (Medial Site) 1230 7/25/2019 12:30 PM  
Access Needle Length (Medial Site) 0.75 inches 7/25/2019 12:30 PM  
Positive Blood Return (Medial Site) Yes 7/31/2019  9:51 PM  
Action Taken (Medial Site) Flushed 7/31/2019  9:51 PM  
Positive Blood Return (Lateral Site) Yes 7/28/2019  7:51 PM  
Alcohol Cap Used Yes 7/28/2019  7:51 PM  
   
Peripheral IV 07/26/19 Left Hand (Active) Site Assessment Clean, dry, & intact 7/31/2019  9:51 PM  
Phlebitis Assessment 0 7/31/2019  9:51 PM  
Infiltration Assessment 0 7/31/2019  9:51 PM  
Dressing Status Clean, dry, & intact 7/31/2019  9:51 PM  
Dressing Type Transparent;Tape 7/31/2019  9:51 PM  
Hub Color/Line Status Yellow; Infusing 7/31/2019  9:51 PM  
Action Taken Open ports on tubing capped 7/31/2019  9:51 PM  
Alcohol Cap Used Yes 7/31/2019  9:51 PM  
  
 
Opportunity for questions and clarification was provided. Patient transported with: 
 [employer]

## 2019-08-01 NOTE — PROGRESS NOTES
[address], [address] [facility], [address] [facility] 
[email or web address] [email or web address] Impression/Plan: 1. Hyperbilirubinemia ( normal transaminases)-suspected drug reaction S/P whipple for pancreatic adenoCA Plan:   Appreciate Dr Manda Hsieh note Await improvement of coag studies on Vit K Consider liver Bx if not better Chief Complaint:  
weak Subjective:   
cont'd jaundice, no abd pain , currently  on Abx NH4 negative , transaminases nl, t bili unchanged at 14.7. Frail, Eyes: Scleral icterus Neck: ROM normal, supple and trachea normal  
Cardiovascular: heart normal, intact distal pulses, normal rate and regular rhythm Pulmonary/Chest Wall: breath sounds normal and effort normal  
Abdominal: appearance normal, bowel sounds normal and soft, non-acute, non-tender Visit Vitals BP 93/54 (BP 1 Location: Left arm, BP Patient Position: At rest) Pulse (!) 109 Temp 98.1 °F (36.7 °C) Resp 21 Ht 4' 6\" (1.372 m) Wt 62.1 kg (137 lb) SpO2 98% Breastfeeding? No  
BMI 33.03 kg/m² Intake/Output Summary (Last 24 hours) at 8/1/2019 1526 Last data filed at 8/1/2019 9046 Gross per 24 hour Intake 1466.66 ml Output 400 ml Net 1066.66 ml  
 
 
CBC w/Diff Lab Results Component Value Date/Time WBC 3.5 (L) 08/01/2019 05:45 AM  
 RBC 2.01 (L) 08/01/2019 05:45 AM  
 HGB 7.1 (L) 08/01/2019 05:45 AM  
 HCT 20.4 (L) 08/01/2019 05:45 AM  
 .5 (H) 08/01/2019 05:45 AM  
 MCH 35.3 (H) 08/01/2019 05:45 AM  
 MCHC 34.8 08/01/2019 05:45 AM  
 RDW 16.6 (H) 08/01/2019 05:45 AM  
  (L) 08/01/2019 05:45 AM  
 Lab Results Component Value Date/Time GRANS 60 08/01/2019 05:45 AM  
 LYMPH 20 (L) 08/01/2019 05:45 AM  
 EOS 2 08/01/2019 05:45 AM  
 BASOS 0 08/01/2019 05:45 AM  
  
Basic Metabolic Profile Recent Labs 08/01/19 
0545   
K 3.1*  
  CO2 28 BUN 3*  
CA 8.2* Hepatic Function Lab Results Component Value Date/Time ALB 2.6 (L) 08/01/2019 05:45 AM  
 TP 4.9 (L) 08/01/2019 05:45 AM  
 AP 86 08/01/2019 05:45 AM  
 Lab Results Component Value Date/Time SGOT 41 (H) 08/01/2019 05:45 AM  
  
 
 
 
 
 
 
 
Andrade Norris MD, MD 
[facility], [address] [place] [address] 
[email or web address]

## 2019-08-01 NOTE — INTERDISCIPLINARY ROUNDS
Interdisciplinary Round Note Patient Information: 
 Braulio Kevin 467/01 Reason for Admission: Hypokalemia [E87.6] Jaundice [R17] Attending Provider:   Terrell Luevano MD 
Primary Care Physician: 
     Gerald Perez MD 
     [phone] Estimated discharge date:  8/4/19 Hospital day: 6 
[unfilled] 
- - - 
RRAT Score: Medium Risk 12 Total Score 3 Has Seen PCP in Last 6 Months (Yes=3, No=0) 3 Patient Length of Stay (>5 days = 3)  
 4 Pt. Coverage (Medicare=5 , Medicaid, or Self-Pay=4) 6 Charlson Comorbidity Score (Age + Comorbid Conditions) Criteria that do not apply:  
 . Living with Significant Other. Assisted Living. LTAC. SNF. or  
Rehab  
 IP Visits Last 12 Months (1-3=4, 4=9, >4=11) ST No 
 
Other Mechanical    
 Lines, Drains, & Airways None IV Antibiotics:   
Current Antimicrobial Therapy (168h ago, onward) Ordered     Start Stop  
 07/26/19 1043  cefepime (MAXIPIME) 2 g in sterile water (preservative free) 10 mL IV syringe  2 g,   IntraVENous,   EVERY 12 HOURS    
 07/26/19 2200 --  
  
 
GI Prophylaxis: GI Prophylaxis: no 
 Type: protonix      Recent Glucose Results:  
Lab Results Component Value Date/Time  (H) 07/31/2019 02:45 AM  
 GLUCPOC 167 (H) 07/31/2019 05:29 PM  
 GLUCPOC 171 (H) 07/31/2019 12:09 PM  
 GLUCPOC 163 (H) 07/31/2019 09:02 AM  
  
Activity Level: Activity Level: Up with Assistance Needs assistance with ADLs: no 
 
 
 Goals for Today: check coags, plan for liver bx tomorrow, cont current meds, check echo for tachycardia, consult cardio. Recommendations:  
Discharge Disposition: Home with home health PT 
P.T, CM and Nutrition Care Management involvement for home health follow up for:  mobility Needs for Discharge:  IDR Team: Ronald Escobar NP, Mike Connolly RN, Recommendations from IDR team: see goals for today Other Notes:

## 2019-08-01 NOTE — PROGRESS NOTES
Bedside and Verbal shift change report given to Trino Pearl (oncoming nurse) by Spike Colon (offgoing nurse). Report included the following information SBAR, Kardex, ED Summary, OR Summary, Procedure Summary, Intake/Output, MAR, Recent Results and Med Rec Status.  ,RN

## 2019-08-01 NOTE — PROCEDURES
RADIOLOGY POST PROCEDURE NOTE August 1, 2019       3:54 PM  
 
Preoperative Diagnosis:   Jaundice. Postoperative Diagnosis:  Same. :  Dr. Chiquita Jhaveri Assistant:  None. Type of Anesthesia: 1% plain lidocaine and IV moderate sedation with Versed and Fentanyl. Procedure/Description:  Image guided random liver Bx. Findings:   No bleeding. Severe steatosis. Estimated blood Loss:  Minimal 
 
Specimen Removed:   yes Blood transfusions:  None. Implants:  None. Complications: None Condition: Stable Discharge Plan:  continue present therapy Godwin Emerson MD

## 2019-08-01 NOTE — PROGRESS NOTES
Preprocedure Assessment Today 8/1/2019 Indication/Symptoms:   Rebekah Mercedes is a 61 y.o. female with a history of abnormal LFTs/hyperbilirubinemia who presents to IR for an image-guided liver biopsy with moderate sedation. Medications and labs reviewed. Patient has been NPO since midnight. Blood thinners held. Elevated INR (2.6) reviewed by Dr. Keiko Henry. Okay to proceed. The H & P and/or progress notes and any available imaging were reviewed. The risks, indications and possible alternatives to the procedure, including doing nothing, were discussed and informed consent was obtained. Physical Exam: 
  
 Heart:  Regular rate Lungs:  Normal respiratory effort The patient is an appropriate candidate to undergo the planned procedure and sedation.  
 
Roxie Vaca, [address]

## 2019-08-01 NOTE — ROUTINE PROCESS
Bedside shift change report given to [facility] (oncoming nurse) by Kylah Werner (offgoing nurse). Report included the following information SBAR, Kardex, Intake/Output, MAR, Recent Results and Cardiac Rhythm ST.

## 2019-08-01 NOTE — PROGRESS NOTES
Problem: Pressure Injury - Risk of 
Goal: *Prevention of pressure injury Description Document Tyson Scale and appropriate interventions in the flowsheet. Outcome: Progressing Towards Goal 
Note:  
Pressure Injury Interventions: 
Sensory Interventions: Assess changes in LOC, Check visual cues for pain, Float heels, Keep linens dry and wrinkle-free, Minimize linen layers Moisture Interventions: Absorbent underpads, Check for incontinence Q2 hours and as needed, Assess need for specialty bed, Internal/External urinary devices, Minimize layers, Offer toileting Q_hr Activity Interventions: Increase time out of bed, Assess need for specialty bed Mobility Interventions: Assess need for specialty bed, Float heels, HOB 30 degrees or less Nutrition Interventions: Document food/fluid/supplement intake Friction and Shear Interventions: HOB 30 degrees or less, Lift sheet, Minimize layers Problem: Patient Education: Go to Patient Education Activity Goal: Patient/Family Education Outcome: Progressing Towards Goal 
  
Problem: Falls - Risk of 
Goal: *Absence of Falls Description Document Guevaravictoriano Diaz Fall Risk and appropriate interventions in the flowsheet. Outcome: Progressing Towards Goal 
Note:  
Fall Risk Interventions: 
Mobility Interventions: Bed/chair exit alarm, Patient to call before getting OOB, Utilize walker, cane, or other assistive device Mentation Interventions: Adequate sleep, hydration, pain control, Bed/chair exit alarm, More frequent rounding, Reorient patient, Room close to nurse's station Medication Interventions: Bed/chair exit alarm, Patient to call before getting OOB, Teach patient to arise slowly Elimination Interventions: Bed/chair exit alarm, Call light in reach, Patient to call for help with toileting needs, Toilet paper/wipes in reach, Toileting schedule/hourly rounds, Stay With Me (per policy) History of Falls Interventions: Bed/chair exit alarm, Room close to nurse's station Problem: Patient Education: Go to Patient Education Activity Goal: Patient/Family Education Outcome: Progressing Towards Goal 
  
Problem: Pain Goal: *Control of Pain Outcome: Progressing Towards Goal 
  
Problem: General Infection Care Plan (Adult and Pediatric) Goal: Improvement in signs and symptoms of infection Outcome: Progressing Towards Goal 
Goal: *Optimize nutritional status Outcome: Progressing Towards Goal 
  
Problem: Nutrition Deficit Goal: *Optimize nutritional status Outcome: Progressing Towards Goal 
  
Problem: Risk for Spread of Infection Goal: Prevent transmission of infectious organism to others Description Prevent the transmission of infectious organisms to other patients, staff members, and visitors. Outcome: Progressing Towards Goal 
  
Problem: Patient Education:  Go to Education Activity Goal: Patient/Family Education Outcome: Progressing Towards Goal

## 2019-08-02 NOTE — PROGRESS NOTES
Received bedside report from [facility]. Pt Alert/Oriented to self/place/situation, SR, RA, Pur Wick intact, Medi-port Lt. Chest, pt resting in bed/bed in low position, wheels locked, call bell within reach. 0924-Pt provided scheduled meds. Pt denies pain or discomfort at this time. 1230-Pt sitting up in bed eating lunch, pt family at bedside. 1730-Pt talking on telephone at this time no s/s of distress noted. 1900-Bedside and Verbal shift change report given to [facility] (oncoming nurse) by [place] RN (offgoing nurse). Report included the following information SBAR, Kardex, MAR and Cardiac Rhythm ST.

## 2019-08-02 NOTE — PROGRESS NOTES
[facility] 
 
 
CARDIOLOGY PROGRESS NOTE 
RECS: 
 
 
1. Sinus tachycardia- better. Treat underlying cause. Recent echo showed hyperdynamic systolic function with EF 65%-70%- will continue to monitor 2. Borderline BP- was hypotensive. Will monitor 3. Pancreatic adenocarcinoma s/p Whipple procedure 4/2019 with adjuvant radiation in [facility]- oncology following 4. Hyperbilirubinemia with jaundice -GI following 5. Severe Anemia-no active bleeding. Transfuse as needed. monitor H&H 6. Diabetes- medications per medical team  
7. Hx anxiety/depression- medicine following 8. Deconditioning- non ambulatory 9. Coagulopathy- was given Vitamin K iv. S/p liver biopsy 8/1/19   
10. Edema-likely related to third spacing from multiple problems including hypoalbuminemia. Lasix as needed if BP tolerates it.  
  
  
    
07/25/19 ECHO ADULT COMPLETE 08/01/2019 8/1/2019  
  Narrative · Left Ventricle: Normal cavity size. Mild concentric hypertrophy. Hyperdynamic systolic dysfunction. Estimated left ventricular ejection  
fraction is 66 - 70%. Biplane method used to measure ejection fraction. No  
regional wall motion abnormality noted. Age-appropriate left ventricular  
diastolic function. ASSESSMENT: 
Hospital Problems  Date Reviewed: 7/17/2019 Codes Class Noted POA Hypoalbuminemia due to protein-calorie malnutrition ([facility] [address] ICD-10-CM: E46 
ICD-9-CM: 263.9  7/26/2019 Yes Hypokalemia ICD-10-CM: E87.6 ICD-9-CM: 276.8  7/25/2019 Yes Jaundice ICD-10-CM: R17 
ICD-9-CM: 782.4  7/25/2019 Yes * (Principal) Obstructive hyperbilirubinemia (Chronic) ICD-10-CM: S69.6 ICD-9-CM: 576.8  7/4/2019 Yes Pancreatic adenocarcinoma (HCC) (Chronic) ICD-10-CM: C25.9 ICD-9-CM: 157.9  11/19/2018 Yes SUBJECTIVE: 
No CP No SOB OBJECTIVE: 
 
VS:  
Visit Vitals /74 (BP 1 Location: Right arm, BP Patient Position: At rest) Pulse (!) 112 Temp 97.8 °F (36.6 °C) Resp 20 Ht 4' 6\" (1.372 m) Wt 62.1 kg (137 lb) SpO2 99% Breastfeeding? No  
BMI 33.03 kg/m² Intake/Output Summary (Last 24 hours) at 8/2/2019 1117 Last data filed at 8/2/2019 8362 Gross per 24 hour Intake 120 ml Output 650 ml Net -530 ml  
 
TELE: Sinus tachycardia. General: alert, well developed, pleasant and in no apparent distress HENT: Normocephalic, atraumatic. Normal external eye. Neck :  no bruit, no JVD Cardiac:  regular rate and rhythm, S1, S2 normal, no click, no rub Lungs: clear to auscultation bilaterally Abdomen: Soft, nontender, no masses Extremities:  Edema BLE up to upper thighs  peripheral pulses present Labs: Results:  
   
Chemistry Recent Labs 08/02/19 
1439 08/01/19 
9348 07/31/19 
0245 GLU  --  123* 153* NA  --  140 143 K  --  3.1* 3.7 CL  --  106 109 CO2  --  28 25 BUN  --  3* 4*  
CREA  --  0.58* 0.52* CA  --  8.2* 8.8 AGAP  --  6 9 BUCR  --  5* 8* AP 91 86 87  
TP 4.7* 4.9* 5.3* ALB 2.3* 2.6* 3.0*  
GLOB 2.4 2.3 2.3 AGRAT 1.0 1.1 1.3  
  
CBC w/Diff Recent Labs 08/02/19 
8718 08/01/19 
7993 07/31/19 
0245 WBC 4.3* 3.5* 3.8*  
RBC 1.99* 2.01* 2.00* HGB 7.0* 7.1* 7.1*  
HCT 20.5* 20.4* 20.7* * 122* 131* GRANS 64 60  --   
LYMPH 19* 20*  --   
EOS 2 2  --   
  
Cardiac Enzymes No results for input(s): CPK, CKND1, SANDIE in the last 72 hours. No lab exists for component: Hussein Bundy Coagulation Recent Labs 08/02/19 
4254 08/01/19 
1783 PTP 25.9* 27.6* INR 2.4* 2.6* APTT 66.4* 63.5* Lipid Panel Lab Results Component Value Date/Time Cholesterol, total 102 05/08/2019 09:08 AM  
 HDL Cholesterol 48 05/08/2019 09:08 AM  
 LDL, calculated 42.4 05/08/2019 09:08 AM  
 VLDL, calculated 11.6 05/08/2019 09:08 AM  
 Triglyceride 58 05/08/2019 09:08 AM  
 CHOL/HDL Ratio 2.1 05/08/2019 09:08 AM  
  
BNP No results for input(s): BNPP in the last 72 hours. Liver Enzymes Recent Labs 08/02/19 
8066 TP 4.7* ALB 2.3* AP 91 SGOT 41* Thyroid Studies Lab Results Component Value Date/Time TSH 1.85 08/01/2019 05:45 AM  
    
 
 
 
[address] NP-C Josie:[phone] I have independently evaluated taken history and examined the patient. All relevant labs and testing data's are reviewed. Care plan discussed and updated after review.  
Margarita Alejandra MD

## 2019-08-02 NOTE — PROGRESS NOTES
NUTRITION Nutrition Screen RECOMMENDATIONS / PLAN:  
 
- Monitor GI symptoms and readiness for diet advancement - Monitor meal/ nutrition supplement intake - Discontinue Magic Cup supplement; continue with Magic Cup MICKIE supplement 
- Continue RD inpatient monitoring and evaluation. NUTRITION INTERVENTIONS & DIAGNOSIS:  
 
[x] Meals/snacks: modified composition 
[x] Medical food supplement therapy: Modify. Discontinue Magic Cup order and continue with Magic Cup MICKIE order 
[x] Collaboration and referral of nutrition care: pt and nutrition plan discussed with Dr Bettie Colbert; plan to continue with full liquid diet at this time per MD 
 
Nutrition Diagnosis:   Unintended weight loss related to predicted prolonged inadequate oral intake as evidenced by net wt loss of 56 lb, 29% x 3 years PTA. Inadequate energy intake related to insufficient calorie diet due to pt inability to tolerate solid food as evidenced by pt being restricted to liquid diet. Excessive nutrient intake (CHO) related to history of diabetes as evidenced by episodes of elevated blood glucose levels. ASSESSMENT:  
 
8/2: Unable to obtain much information from pt today. Denied having any abdominal pain. Does not report to this write about consuming of meals or nutrition supplements. Unable to obtain information from nursing. No nausea per GI report. Episodes of elevated blood glucose levels 7/31: Pt with fair appetite and meal intake; tolerating full liquid diet. Agreeable to nutrition supplements; does not want Ensure Enlive, agreeable to [employer] supplement. Po intake encouraged 7/30: Pt consuming clear liquids; poor/fair meal intake but tolerating diet per RN. Pt denied having any abdominal pain or N/V. Ensure Clear is ordered, but pt not consuming, dislikes the supplement. Declined having it changed to [address] Discussed report and recommendation for advancing po diet to full liquids with Dr Bettie Colbert; MD agreed with plan. 7/29: Pt started on clear liquids. Reported good appetite/ meal intake; tolerating diet. Agreeable to nutrition supplement. Noted difficulties with MRCP per GI note today 7/26: Pt asleep/ lethargic at time of visit. Currently NPO for planned MRCP today. Average po intake adequate to meet patients estimated nutritional needs:   [] Yes     [] No   [x] Unable to determine at this time Diet: DIET NUTRITIONAL SUPPLEMENTS Breakfast, Lunch, Dinner; [employer] CUPS 
DIET NUTRITIONAL SUPPLEMENTS Breakfast, Lunch, Dinner; [employer] CUPS MICKIE 
DIET FULL LIQUID Food Allergies:  None known Current Appetite:   [] Good     [] Fair     [] Poor     [x] Other: unknown Appetite/meal intake prior to admission:   [] Good     [] Fair     [] Poor     [x] Other:  Unknown Feeding Limitations:  [] Swallowing difficulty    [] Chewing difficulty    [] Other: 
Current Meal Intake:  
Patient Vitals for the past 100 hrs: 
 % Diet Eaten  
07/31/19 1731 25 % 07/31/19 1339 25 % 07/31/19 0904 0 % BM: 7/31 Skin Integrity:  No pressure injury or wound noted; jaundiced Edema:   [] No     [x] Yes Pertinent Medications: Reviewed: lasix, SSI, vitamin K1, protonix, D5 at 50 mL/hr (60 gm dextrose, 204 kcal per day) Recent Labs 08/02/19 
7153 08/01/19 
8229 07/31/19 
0245 NA  --  140 143 K  --  3.1* 3.7 CL  --  106 109 CO2  --  28 25 GLU  --  123* 153* BUN  --  3* 4*  
CREA  --  0.58* 0.52* CA  --  8.2* 8.8 MG  --  1.7  --   
ALB 2.3* 2.6* 3.0*  
SGOT 41* 41* 40* ALT 23 23 22 Intake/Output Summary (Last 24 hours) at 8/2/2019 1725 Last data filed at 8/2/2019 1331 Gross per 24 hour Intake 120 ml Output 1050 ml Net -930 ml Anthropometrics: 
Ht Readings from Last 1 Encounters:  
08/01/19 4' 6\" (1.372 m) Last 3 Recorded Weights in this Encounter  
 07/25/19 2126 07/26/19 0405 08/01/19 1458 Weight: 62.4 kg (137 lb 8 oz) 62.1 kg (137 lb) 62.1 kg (137 lb) Body mass index is 33.03 kg/m². Obese, Class I Weight History:   Noted weight loss, then weight gain during past few years; net wt loss of 56 lb, 29% x 3 years PTA per chart hx. Pt reported experiencing unplanned wt loss PTA per nursing screen Weight Metrics 8/1/2019 7/17/2019 5/6/2019 12/24/2018 12/3/2018 9/27/2016 Weight 137 lb 130 lb 116 lb 12.8 oz 116 lb 115 lb 193 lb BMI 33.03 kg/m2 25.39 kg/m2 22.81 kg/m2 22.65 kg/m2 22.46 kg/m2 37.69 kg/m2 Admitting Diagnosis: Hypokalemia [E87.6] Jaundice [R17] Pertinent PMHx:  Pancreatic cancer, DM, HTN Education Needs:        [x] None identified  [] Identified - Not appropriate at this time  []  Identified and addressed - refer to education log Learning Limitations:   [x] None identified  [] Identified Cultural, Yazidism & ethnic food preferences:  [x] None identified    [] Identified and addressed ESTIMATED NUTRITION NEEDS:  
 
Calories: 4139-5956 kcal (HBEx1.2-1.3) based on  [x] Actual BW: 62 kg      [] IBW  
CHO: 180-194 gm (50% kcal) Protein: 62-74 gm (1-1.2 gm/kg) based on  [x] Actual BW      [] IBW Fluid: 1 mL/kcal 
  
MONITORING & EVALUATION:  
 
Nutrition Goal(s): 1. Po intake of meals will meet >75% of patient estimated nutritional needs within the next 7 days. Outcome:  [] Met/Ongoing    [x] Progressing towards goal    [] Not progressing towards goal    [] New/Initial goal  
 
Monitoring:   [x] Food and beverage intake   [x] Diet order   [x] Nutrition-focused physical findings   [x] Treatment/therapy   [] Weight   [] Enteral nutrition intake Previous Recommendations (for follow-up assessments only):     []   Implemented       [x]   Not Implemented (RD to address)      [] No Longer Appropriate     [] No Recommendation Made Discharge Planning:  Diabetic, cardiac diet [x] Participated in care planning, discharge planning, & interdisciplinary rounds as appropriate Cheryl Peace RD Pager: [phone]

## 2019-08-02 NOTE — PROGRESS NOTES
Problem: Pressure Injury - Risk of 
Goal: *Prevention of pressure injury Description Document Tyson Scale and appropriate interventions in the flowsheet. Outcome: Progressing Towards Goal 
Note:  
Pressure Injury Interventions: 
Sensory Interventions: Assess changes in LOC, Assess need for specialty bed, Check visual cues for pain, Float heels, Keep linens dry and wrinkle-free, Minimize linen layers Moisture Interventions: Absorbent underpads, Assess need for specialty bed, Check for incontinence Q2 hours and as needed, Internal/External urinary devices, Minimize layers Activity Interventions: Increase time out of bed Mobility Interventions: Assess need for specialty bed, Float heels, HOB 30 degrees or less Nutrition Interventions: Document food/fluid/supplement intake Friction and Shear Interventions: HOB 30 degrees or less, Lift sheet, Minimize layers Problem: Patient Education: Go to Patient Education Activity Goal: Patient/Family Education Outcome: Progressing Towards Goal 
  
Problem: Falls - Risk of 
Goal: *Absence of Falls Description Document Georgia Maddox Fall Risk and appropriate interventions in the flowsheet. Outcome: Progressing Towards Goal 
Note:  
Fall Risk Interventions: 
Mobility Interventions: Bed/chair exit alarm, Patient to call before getting OOB, Utilize walker, cane, or other assistive device Mentation Interventions: Adequate sleep, hydration, pain control, Bed/chair exit alarm, More frequent rounding, Reorient patient, Room close to nurse's station, Toileting rounds, Update white board Medication Interventions: Bed/chair exit alarm, Patient to call before getting OOB, Teach patient to arise slowly Elimination Interventions: Bed/chair exit alarm, Call light in reach, Patient to call for help with toileting needs, Toilet paper/wipes in reach, Toileting schedule/hourly rounds, Stay With Me (per policy), Elevated toilet seat History of Falls Interventions: Bed/chair exit alarm, Room close to nurse's station Problem: Patient Education: Go to Patient Education Activity Goal: Patient/Family Education Outcome: Progressing Towards Goal 
  
Problem: Pain Goal: *Control of Pain Outcome: Progressing Towards Goal 
  
Problem: General Infection Care Plan (Adult and Pediatric) Goal: Improvement in signs and symptoms of infection Outcome: Progressing Towards Goal 
Goal: *Optimize nutritional status Outcome: Progressing Towards Goal 
  
Problem: Nutrition Deficit Goal: *Optimize nutritional status Outcome: Progressing Towards Goal 
  
Problem: Risk for Spread of Infection Goal: Prevent transmission of infectious organism to others Description Prevent the transmission of infectious organisms to other patients, staff members, and visitors. Outcome: Progressing Towards Goal 
  
Problem: Patient Education:  Go to Education Activity Goal: Patient/Family Education Outcome: Progressing Towards Goal

## 2019-08-02 NOTE — PROGRESS NOTES
[email or web address] 
[phone] Gastroenterology follow up-Progress note Impression: 1. Hx of pancreatic ca s/p whipple procedure 4/19 now on chemo rad therapy - last 5Fu treatment was over 2 weeks ago 2. Weakness fatigue and weight loss - multifactorial  
3. Jaundice and elevated Tbili transaminases fairly normal - likely cholestasis due to chemo reaction, waiting on liver bx results 4.   Diarrhea improved Plan: 1. Await liver bx results 2. Monitor LFTs 3. Continue Creon 4. Continue with current medical management Chief Complaint: Hyperbilirubinemia Subjective:  Feeling ok, denies abdominal pain, nausea Eyes: Scleral icterus, EOM normal  
Neck: ROM normal, supple and trachea normal  
Cardiovascular: heart normal, intact distal pulses, normal rate and regular rhythm Pulmonary/Chest Wall: breath sounds normal and effort normal  
Abdominal: appearance normal, bowel sounds normal and soft, non-acute, mildly tender at bx site as expected Patient Active Problem List  
Diagnosis Code  Hypokalemia E87.6  Jaundice R17  
 Essential hypertension I10  
 Obstructive hyperbilirubinemia K83.8  Pancreatic adenocarcinoma ([facility] [place] [address] C25.9  Type 2 diabetes mellitus without complication, without long-term current use of insulin (HCC) E11.9  Hypoalbuminemia due to protein-calorie malnutrition ([facility] [place] [address] E46 Visit Vitals /73 (BP 1 Location: Right arm, BP Patient Position: At rest) Pulse (!) 121 Temp 97.6 °F (36.4 °C) Resp 20 Ht 4' 6\" (1.372 m) Wt 62.1 kg (137 lb) SpO2 99% Breastfeeding? No  
BMI 33.03 kg/m² Intake/Output Summary (Last 24 hours) at 8/2/2019 1323 Last data filed at 8/2/2019 3100 Gross per 24 hour Intake 120 ml Output 650 ml Net -530 ml CBC w/Diff Lab Results Component Value Date/Time  WBC 4.3 (L) 08/02/2019 03:38 AM  
 RBC 1.99 (L) 08/02/2019 03:38 AM  
 HGB 7.0 (L) 08/02/2019 03:38 AM  
 HCT 20.5 (L) 08/02/2019 03:38 AM  
 .0 (H) 08/02/2019 03:38 AM  
 MCH 35.2 (H) 08/02/2019 03:38 AM  
 MCHC 34.1 08/02/2019 03:38 AM  
 RDW 16.9 (H) 08/02/2019 03:38 AM  
  (L) 08/02/2019 03:38 AM  
 Lab Results Component Value Date/Time GRANS 64 08/02/2019 03:38 AM  
 LYMPH 19 (L) 08/02/2019 03:38 AM  
 EOS 2 08/02/2019 03:38 AM  
 BASOS 1 08/02/2019 03:38 AM  
  
Basic Metabolic Profile Recent Labs 08/01/19 
0545   
K 3.1*  
 CO2 28 BUN 3*  
CA 8.2* MG 1.7 Hepatic Function Lab Results Component Value Date/Time ALB 2.3 (L) 08/02/2019 03:38 AM  
 TP 4.7 (L) 08/02/2019 03:38 AM  
 AP 91 08/02/2019 03:38 AM  
 Lab Results Component Value Date/Time SGOT 41 (H) 08/02/2019 03:38 AM  
  
 
 
Coags Recent Labs 08/02/19 
8497 08/01/19 
6394 PTP 25.9* 27.6* INR 2.4* 2.6* APTT 66.4* 63.5* SHIRAZ Rodgers [facility]. [email or web address] Phone: [phone] Pager: [phone]

## 2019-08-02 NOTE — ROUTINE PROCESS
Bedside and Verbal shift change report given HETAL Henderson (oncoming nurse) by Leonor Martinez RN (offgoing nurse). Report included the following information SBAR, Kardex, MAR and Recent Results. SITUATION: 
Code Status: Full Code Reason for Admission: Hypokalemia [E87.6] Jaundice [R17] Hospital day: 8 Problem List:  
   
Hospital Problems  Date Reviewed: 7/17/2019 Codes Class Noted POA Hypoalbuminemia due to protein-calorie malnutrition ([facility] ICD-10-CM: E46 
ICD-9-CM: 263.9  7/26/2019 Yes Hypokalemia ICD-10-CM: E87.6 ICD-9-CM: 276.8  7/25/2019 Yes Jaundice ICD-10-CM: R17 
ICD-9-CM: 782.4  7/25/2019 Yes * (Principal) Obstructive hyperbilirubinemia (Chronic) ICD-10-CM: F74.3 ICD-9-CM: 576.8  7/4/2019 Yes Pancreatic adenocarcinoma (HCC) (Chronic) ICD-10-CM: C25.9 ICD-9-CM: 157.9  11/19/2018 Yes BACKGROUND: 
 Past Medical History:  
Past Medical History:  
Diagnosis Date  Asthma  Cancer ([facility] 11/13/2018 Pancreatic cancer  Diabetes ([facility]  Hypertension  Hypoalbuminemia due to protein-calorie malnutrition ([facility] 7/26/2019  Ill-defined condition \"nerve problem\"  Pancreatic adenocarcinoma ([facility]  Syphilis Patient taking anticoagulants no Patient has a defibrillator: no  
 History of shots YES for example, flu, pneumonia, tetanus Isolation History NO for example, MRSA, CDiff ASSESSMENT: 
Changes in Assessment Throughout Shift: NONE Significant Changes in 24 hours (for example, RR/code, fall) Patient has Central Line: yes Reasons if yes: Irritant/vesicant Patient has Vasquez Cath: no Mobility Issues PT 
IV Patency OR Checklist 
Pending Tests Last Vitals: 
Vitals w/ MEWS Score (last day) Date/Time MEWS Score Pulse Resp Temp BP Level of Consciousness SpO2  
 08/02/19 0338  1  97  20  97.7 °F (36.5 °C)  115/71  Alert  99 % 08/01/19 2346  2  (!) 108  20  98.2 °F (36.8 °C)  109/72  Alert  100 % 08/01/19 1915  2  (!) 108  20  97.8 °F (36.6 °C)  108/76  Alert  97 % 08/01/19 1553  3  (!) 105  22  97.8 °F (36.6 °C)  104/70  Alert  96 % 08/01/19 1300    (!) 109  21    93/54  Alert  98 % 08/01/19 1255    (!) 105  22    94/57  Alert  98 % 08/01/19 1250    (!) 105  19    97/56  Alert  98 % 08/01/19 1245    (!) 104  13    101/56  Alert  98 % 08/01/19 1230    (!) 105  15    104/62  Alert  98 % 08/01/19 1220    (!) 106  17    100/67  Alert  99 % 08/01/19 0958          107/66      
 08/01/19 0834  3  (!) 120  20  98.1 °F (36.7 °C)  (!) 137/91  Alert  96 % 08/01/19 0426  2  (!) 106  20  97.8 °F (36.6 °C)  107/66  Alert  97 % PAIN Pain Assessment Pain Intensity 1: 0 (08/02/19 0338) Pain Intervention(s) 1: Medication (see MAR)(given by Elio Aldana RN) Patient Stated Pain Goal: 0 Intervention effective: N/A Time of last intervention: N/A Reassessment Completed: yes Other actions taken for pain: Distraction Last 3 Weights: 
Last 3 Recorded Weights in this Encounter  
 07/25/19 2126 07/26/19 0405 08/01/19 7734 Weight: 62.4 kg (137 lb 8 oz) 62.1 kg (137 lb) 62.1 kg (137 lb) Weight change: INTAKE/OUPUT Current Shift: No intake/output data recorded. Last three shifts: 07/31 1901 - 08/02 0700 In: 963.3 [P.O.:360; I.V.:603.3] Out: 650 [Urine:650] RECOMMENDATIONS AND DISCHARGE PLANNING Patient needs and requests: Assistance with ADL's 
 
Pending tests/procedures: labs Discharge plan for patient: Home Discharge planning Needs or Barriers: none Estimated Discharge Date: 7/31/2019 Posted on Whiteboard in Patients Room: no   
  
\"HEALS\" SAFETY CHECK A safety check occurred in the patient's room between off going nurse and oncoming nurse listed above. The safety check included the below items: 
 
H High Alert Medications Verify all high alert medication drips (heparin, PCA, etc.) E Equipment Suction is set up for ALL patients (with milly) Red plugs utilized for all equipment (IV pumps, etc.) WOWs wiped down at end of shift. Room stocked with oxygen, suction, and other unit-specific supplies A Alarms Bed alarm is set for fall risk patients Ensure chair alarm is in place and activated if patient is up in a chair L Lines Check IV for any infiltration Vasquez bag is empty if patient has a Vasquez Tubing and IV bags are labeled [facility] Safety Room is clean, patient is clean, and equipment is clean. Hallways are clear from equipment besides carts. Fall bracelet on for fall risk patients Ensure room is clear and free of clutter Suction is set up for ALL patients (with milly) Hallways are clear from equipment besides carts. Isolation precautions followed, supplies available outside room, sign posted Orie Sicard, RN

## 2019-08-02 NOTE — PROGRESS NOTES
PT eval order received and chart reviewed. Unable to see patient at this time as patient has active bedrest order. Please D/C to allow therapy participation. Will follow up as patient schedule allows. Thank you for this referral. Angelica Kirkland, PT, DPT.

## 2019-08-02 NOTE — PROGRESS NOTES
Hematology / Oncology Progress Note [address] Patient ID: 
Yue Pack [id number] y.o. 
1955 Admit Date: 2019 Assessment:  
 
Principal Problem: 
  Obstructive hyperbilirubinemia (2019) Active Problems: Hypokalemia (2019) Jaundice (2019) Pancreatic adenocarcinoma ([facility] [address] (2018) Hypoalbuminemia due to protein-calorie malnutrition ([facility] [facility] (2019) 
 
presumptive cholestasis due to chemo reaction (5FU) Coagulopathy Pancreatic cancer - post neoadjuvant chemo Post Whipple Post adjuvant chemo xrt 
radiographically without evidence of recurrent disease All imaging to date shows cholestasis no obstruction, no recurrence Plan:  
 
Track cbc, lytes, lfts, coags Check ammonia Cont vit K Await liver bx results Mobilize Pt eval 
Not neutropenic Subjective:  
Tired. Denies pain. No bleeding. No nausea Has not been out of bed Objective:  
 
Visit Vitals /71 (BP 1 Location: Right arm, BP Patient Position: At rest;Head of bed elevated (Comment degrees)) Pulse 97 Temp 97.7 °F (36.5 °C) Resp 20 Ht 4' 6\" (1.372 m) Wt 62.1 kg (137 lb) SpO2 99% Breastfeeding? No  
BMI 33.03 kg/m² Temp (24hrs), Av.9 °F (36.6 °C), Min:97.7 °F (36.5 °C), Max:98.2 °F (36.8 °C) Intake/Output Summary (Last 24 hours) at 2019 3381 Last data filed at 2019 4692 Gross per 24 hour Intake 120 ml Output 650 ml Net -530 ml Review of Systems:  
Constitutional:  No Fever; No chills; No weight loss Skin:  No rash; No itching HEENT:  No changes in vision or hearing Cardiovascular:  No palpitations, chest pain, angina Respiratory:  No shortness of breath, no wheezing, no pleurisy, no cough, no  hemoptysis Gastrointestinal:  No nausea or vomiting; No diarrhea, no dyspepsis Genitourinary:  No dysuria; No increase in urinary frequency Musculoskeletal:  No weakness or muscle pains Endo:  No polyuria; no symptoms of thyroid dysfunction Heme:  No bruising; No bleeding, no adenopathy Neurological:  No Seizures; No focal weakness or sensory changes Psychiatric:  No mood changes; no suicidal ideation Physical Exam: 
General appearance - chronically ill looking, nad Eyes - pupils equal and reactive, extraocular eye movements intact, icteric Ears - not examined Mouth - mucous membranes moist, pharynx normal without lesions Neck - supple, no significant adenopathy Lymphatics - no palpable lymphadenopathy, no hepatosplenomegaly Chest - clear to auscultation, no wheezes, rales or rhonchi, symmetric air entry Heart - tachuy Abdomen - soft, nontender, nondistended, no masses or organomegaly Back exam - not examined Neurological - alert, oriented, normal speech, no focal findings or movement disorder noted Extremities - peripheral pulses normal, no pedal edema, no clubbing or cyanosis Skin - normal coloration and turgor, no rashes, no suspicious skin lesions noted Labs: 
Basic Metabolic Profile Recent Labs 08/01/19 
0545 07/31/19 
0245  143 CO2 28 25 BUN 3* 4* CBC w/Diff Recent Labs 08/02/19 
9097 08/01/19 
6373 07/31/19 
0245 WBC 4.3* 3.5* 3.8* HCT 20.5* 20.4* 20.7* No results for input(s): BANDS, LYMPHOCYTES in the last 72 hours. No lab exists for component: SEGS Hepatic Panel Hepatic Function No results found for: ALBUMIN   
 
Coagulation Recent Labs 08/02/19 
5313 08/01/19 
0545 07/31/19 
1358 INR 2.4* 2.6* 2.5* APTT 66.4* 63.5* 56.7* Current medications reviewed Ani Figueroa MD  
[facility] Office [phone]

## 2019-08-03 NOTE — PROGRESS NOTES
Does not communicate much. PE:  
Visit Vitals /69 (BP 1 Location: Left arm, BP Patient Position: At rest) Pulse 100 Temp 97.6 °F (36.4 °C) Resp 17 Ht 4' 6\" (1.372 m) Wt 62.2 kg (137 lb 3.2 oz) SpO2 95% Breastfeeding? No  
BMI 33.08 kg/m² Abdomen soft BS present. Lungs CTA Recent Results (from the past 12 hour(s)) HEPATIC FUNCTION PANEL Collection Time: 08/03/19  6:15 AM  
Result Value Ref Range Protein, total 4.6 (L) 6.4 - 8.2 g/dL Albumin 2.1 (L) 3.4 - 5.0 g/dL Globulin 2.5 2.0 - 4.0 g/dL A-G Ratio 0.8 0.8 - 1.7 Bilirubin, total 15.3 (H) 0.2 - 1.0 MG/DL Bilirubin, direct 10.0 (H) 0.0 - 0.2 MG/DL Alk. phosphatase 91 45 - 117 U/L  
 AST (SGOT) 49 (H) 10 - 38 U/L  
 ALT (SGPT) 24 13 - 56 U/L  
PTT Collection Time: 08/03/19  7:03 AM  
Result Value Ref Range aPTT 67.1 (H) 23.0 - 36.4 SEC PROTHROMBIN TIME + INR Collection Time: 08/03/19  7:03 AM  
Result Value Ref Range Prothrombin time 25.1 (H) 11.5 - 15.2 sec INR 2.3 (H) 0.8 - 1.2 FIBRINOGEN Collection Time: 08/03/19  7:03 AM  
Result Value Ref Range Fibrinogen 105 (L) 210 - 451 mg/dL CBC WITH AUTOMATED DIFF Collection Time: 08/03/19  7:03 AM  
Result Value Ref Range WBC 6.1 4.6 - 13.2 K/uL  
 RBC 2.15 (L) 4.20 - 5.30 M/uL HGB 7.7 (L) 12.0 - 16.0 g/dL HCT 22.0 (L) 35.0 - 45.0 % .3 (H) 74.0 - 97.0 FL  
 MCH 35.8 (H) 24.0 - 34.0 PG  
 MCHC 35.0 31.0 - 37.0 g/dL  
 RDW 17.0 (H) 11.6 - 14.5 % PLATELET 368 (L) 031 - 420 K/uL MPV 11.2 9.2 - 11.8 FL  
 NEUTROPHILS 66 40 - 73 % LYMPHOCYTES 18 (L) 21 - 52 % MONOCYTES 15 (H) 3 - 10 % EOSINOPHILS 1 0 - 5 % BASOPHILS 0 0 - 2 %  
 ABS. NEUTROPHILS 4.0 1.8 - 8.0 K/UL  
 ABS. LYMPHOCYTES 1.1 0.9 - 3.6 K/UL  
 ABS. MONOCYTES 0.9 0.05 - 1.2 K/UL  
 ABS. EOSINOPHILS 0.1 0.0 - 0.4 K/UL  
 ABS. BASOPHILS 0.0 0.0 - 0.1 K/UL  
 DF AUTOMATED    
GLUCOSE, POC  Collection Time: 08/03/19  8:40 AM  
 Result Value Ref Range Glucose (POC) 123 (H) 70 - 110 mg/dL A/P Drug induced liver injury (DILI)- cholestatic picture. S/p liver biopsy. History of Pancreatic adenoca. S.p whipple.   
 
Durga Alas MD

## 2019-08-03 NOTE — PROGRESS NOTES
Phone: [phone] Hematology / Oncology Progress Note [address] Patient: Marine Hutton   MRN: [id number]         [facility]: [id number] YOB: 1955 Admit Date: 2019 Assessment:  
 
Principal Problem: 
  Obstructive hyperbilirubinemia (2019) Active Problems: Hypokalemia (2019) Jaundice (2019) Pancreatic adenocarcinoma ([facility] [address] (2018) Hypoalbuminemia due to protein-calorie malnutrition ([facility] [address] (2019) 
 
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  
 
Await liver biopsy Supportive care Vit K IV, IVF 
D/c neutropenic precautions Aspiration precautions Prognosis guarded [address]  Office [phone] Subjective:  
 
Incoherent, lethargic Objective:  
 
Visit Vitals /69 (BP 1 Location: Left arm, BP Patient Position: At rest) Pulse 100 Temp 97.6 °F (36.4 °C) Resp 17 Ht 4' 6\" (1.372 m) Wt 62.2 kg (137 lb 3.2 oz) SpO2 95% Breastfeeding? No  
BMI 33.08 kg/m² Temp (24hrs), Av.9 °F (36.6 °C), Min:97.5 °F (36.4 °C), Max:98.8 °F (37.1 °C) Intake/Output Summary (Last 24 hours) at 8/3/2019 1026 Last data filed at 2019 1331 Gross per 24 hour Intake  Output 400 ml Net -400 ml Review of Systems:  
Cannot be obtained, incoherent Physical Exam: 
Constitutional: lethargic, Eyes: icterus 
resp: symmetrical expansion, no rales, no rhonchi, no wheezing. Cardiovascular: S1S2, no pathologic murmur, no rub. Gastrointestinal: soft, non tender,normal bowel sounds Integument: no rash, no petechiae, no ecchymosis. Musculoskeletal: no edema, no cyanosis, no clubbing. Neurological: incoherent, hepatic tremors Labs: 
Recent Results (from the past 24 hour(s)) GLUCOSE, POC Collection Time: 08/02/19 12:28 PM  
Result Value Ref Range Glucose (POC) 134 (H) 70 - 110 mg/dL GLUCOSE, POC Collection Time: 08/02/19  4:33 PM  
Result Value Ref Range Glucose (POC) 106 70 - 110 mg/dL GLUCOSE, POC Collection Time: 08/02/19 10:11 PM  
Result Value Ref Range Glucose (POC) 119 (H) 70 - 110 mg/dL HEPATIC FUNCTION PANEL Collection Time: 08/03/19  6:15 AM  
Result Value Ref Range Protein, total 4.6 (L) 6.4 - 8.2 g/dL Albumin 2.1 (L) 3.4 - 5.0 g/dL Globulin 2.5 2.0 - 4.0 g/dL A-G Ratio 0.8 0.8 - 1.7 Bilirubin, total 15.3 (H) 0.2 - 1.0 MG/DL Bilirubin, direct 10.0 (H) 0.0 - 0.2 MG/DL Alk. phosphatase 91 45 - 117 U/L  
 AST (SGOT) 49 (H) 10 - 38 U/L  
 ALT (SGPT) 24 13 - 56 U/L  
PTT Collection Time: 08/03/19  7:03 AM  
Result Value Ref Range aPTT 67.1 (H) 23.0 - 36.4 SEC PROTHROMBIN TIME + INR Collection Time: 08/03/19  7:03 AM  
Result Value Ref Range Prothrombin time 25.1 (H) 11.5 - 15.2 sec INR 2.3 (H) 0.8 - 1.2 FIBRINOGEN Collection Time: 08/03/19  7:03 AM  
Result Value Ref Range Fibrinogen 105 (L) 210 - 451 mg/dL CBC WITH AUTOMATED DIFF Collection Time: 08/03/19  7:03 AM  
Result Value Ref Range WBC 6.1 4.6 - 13.2 K/uL  
 RBC 2.15 (L) 4.20 - 5.30 M/uL HGB 7.7 (L) 12.0 - 16.0 g/dL HCT 22.0 (L) 35.0 - 45.0 % .3 (H) 74.0 - 97.0 FL  
 MCH 35.8 (H) 24.0 - 34.0 PG  
 MCHC 35.0 31.0 - 37.0 g/dL  
 RDW 17.0 (H) 11.6 - 14.5 % PLATELET 339 (L) 951 - 420 K/uL MPV 11.2 9.2 - 11.8 FL  
 NEUTROPHILS 66 40 - 73 % LYMPHOCYTES 18 (L) 21 - 52 % MONOCYTES 15 (H) 3 - 10 % EOSINOPHILS 1 0 - 5 % BASOPHILS 0 0 - 2 %  
 ABS. NEUTROPHILS 4.0 1.8 - 8.0 K/UL  
 ABS. LYMPHOCYTES 1.1 0.9 - 3.6 K/UL  
 ABS. MONOCYTES 0.9 0.05 - 1.2 K/UL  
 ABS. EOSINOPHILS 0.1 0.0 - 0.4 K/UL  
 ABS. BASOPHILS 0.0 0.0 - 0.1 K/UL  
 DF AUTOMATED    
GLUCOSE, POC  Collection Time: 08/03/19  8:40 AM  
 Result Value Ref Range Glucose (POC) 123 (H) 70 - 110 mg/dL

## 2019-08-03 NOTE — PROGRESS NOTES
Physician notified that patient was not voiding. Performed bladder scan with 274mL found. One time order to straight cath with 400mL output, no new orders regarding possible perez insertion at this time.

## 2019-08-03 NOTE — PROGRESS NOTES
[facility] Hospitalist Group Progress Note Patient: June Waters Age: 61 y.o. : 1955 MR#: [id number] SSN: [id number] Date/Time: 2019 Subjective:  
 
Pt admitted to hosp for elevated Bilirubin & obstructive jaundice in the setting of H/o Pancreatic carcinoma Assessment/Plan: 1. Obstructive jaundice with hyperbilirubinemia - Has had whipple's procedure done -  GI consulted , MRCP report reviewed - CT negative for obstruction 2. Hepatology note reviewed - Vit K 10mg x 3 for 3 days - Liver bx done by IR today - await results - no improvement in INR despite Vit K  
3. H/o Pancreatic cancer - start creon when able to tolerate - Oncology on board  - CTA chest done 2y to Tachycardia - negative 4. Chronic anemia - monitor H&H   
5. Mild elevation in LFT's - monitor 6. Tachycardia - Cardiology consult in AM , Echo results reviewed - continue current Rx Plan DVT px - elevated INR  
FC Case discussed with:  [x]Patient  []Family  []Nursing  []Case Management DVT Prophylaxis:  []Lovenox  []Hep SQ  []SCDs  []Coumadin   []On Heparin gtt Objective:  
VS:  
Visit Vitals /62 (BP 1 Location: Left arm, BP Patient Position: At rest) Pulse 100 Temp 98.3 °F (36.8 °C) Resp 18 Ht 4' 6\" (1.372 m) Wt 62.2 kg (137 lb 3.2 oz) SpO2 95% Breastfeeding? No  
BMI 33.08 kg/m² Tmax/24hrs: Temp (24hrs), Av.9 °F (36.6 °C), Min:97.6 °F (36.4 °C), Max:98.3 °F (36.8 °C) IOBRIEF Intake/Output Summary (Last 24 hours) at 2019 2310 Last data filed at 2019 1331 Gross per 24 hour Intake  Output 1050 ml Net -1050 ml General:  Alert, cooperative, no acute distress HEENT: PERRLA, icteric sclera & skin . Pulmonary:  CTA Bilaterally. No Wheezing/Rhonchi/Rales. Cardiovascular: Regular rate and Rhythm. GI:  Soft, Non distended, Non tender. + Bowel sounds. Extremities:  No edema, cyanosis, clubbing. No calf tenderness. Neurologic: Alert and oriented X 3. No acute neuro deficits. Additional: 
 
Medications:  
Current Facility-Administered Medications Medication Dose Route Frequency  phytonadione (vitamin K1) (AQUA-MEPHYTON) 10 mg in 0.9% sodium chloride 50 mL IVPB  10 mg IntraVENous DAILY  acetaminophen (TYLENOL) tablet 500 mg  500 mg Oral PRN  
 diphenhydrAMINE (BENADRYL) capsule 25 mg  25 mg Oral PRN  
 sodium chloride (NS) flush 5-40 mL  5-40 mL IntraVENous Q8H  
 sodium chloride (NS) flush 5-40 mL  5-40 mL IntraVENous PRN  
 gelatin adsorbable (GELFOAM) 12-7 mm sponge 1 Each  1 Each Topical PRN  
 flumazenil (ROMAZICON) 0.1 mg/mL injection 0.2 mg  0.2 mg IntraVENous Multiple  naloxone (NARCAN) injection 0.1 mg  0.1 mg IntraVENous Multiple  morphine injection 1 mg  1 mg IntraVENous Q8H PRN  
 dextrose 5% infusion  50 mL/hr IntraVENous CONTINUOUS  
 cefepime (MAXIPIME) 2 g in sterile water (preservative free) 10 mL IV syringe  2 g IntraVENous Q12H  
 sodium chloride (NS) flush 5-10 mL  5-10 mL IntraVENous PRN  
 busPIRone (BUSPAR) tablet 10 mg  10 mg Oral TID  pantoprazole (PROTONIX) 40 mg in sodium chloride 0.9% 10 mL injection  40 mg IntraVENous Q12H  
 insulin lispro (HUMALOG) injection   SubCUTAneous AC&HS  
 glucose chewable tablet 16 g  4 Tab Oral PRN  
 glucagon (GLUCAGEN) injection 1 mg  1 mg IntraMUSCular PRN  
 dextrose 10% infusion 125-250 mL  125-250 mL IntraVENous PRN  
 furosemide (LASIX) injection 40 mg  40 mg IntraVENous PRN Labs:   
Recent Results (from the past 24 hour(s)) PTT Collection Time: 08/02/19  3:38 AM  
Result Value Ref Range aPTT 66.4 (H) 23.0 - 36.4 SEC PROTHROMBIN TIME + INR Collection Time: 08/02/19  3:38 AM  
Result Value Ref Range Prothrombin time 25.9 (H) 11.5 - 15.2 sec INR 2.4 (H) 0.8 - 1.2 FIBRINOGEN Collection Time: 08/02/19  3:38 AM  
Result Value Ref Range Fibrinogen 111 (L) 210 - 451 mg/dL CBC WITH AUTOMATED DIFF  
 Collection Time: 08/02/19  3:38 AM  
Result Value Ref Range WBC 4.3 (L) 4.6 - 13.2 K/uL  
 RBC 1.99 (L) 4.20 - 5.30 M/uL HGB 7.0 (L) 12.0 - 16.0 g/dL HCT 20.5 (L) 35.0 - 45.0 % .0 (H) 74.0 - 97.0 FL  
 MCH 35.2 (H) 24.0 - 34.0 PG  
 MCHC 34.1 31.0 - 37.0 g/dL  
 RDW 16.9 (H) 11.6 - 14.5 % PLATELET 105 (L) 494 - 420 K/uL MPV 10.5 9.2 - 11.8 FL  
 NEUTROPHILS 64 40 - 73 % LYMPHOCYTES 19 (L) 21 - 52 % MONOCYTES 14 (H) 3 - 10 % EOSINOPHILS 2 0 - 5 % BASOPHILS 1 0 - 2 %  
 ABS. NEUTROPHILS 2.8 1.8 - 8.0 K/UL  
 ABS. LYMPHOCYTES 0.8 (L) 0.9 - 3.6 K/UL  
 ABS. MONOCYTES 0.6 0.05 - 1.2 K/UL  
 ABS. EOSINOPHILS 0.1 0.0 - 0.4 K/UL  
 ABS. BASOPHILS 0.0 0.0 - 0.1 K/UL  
 DF AUTOMATED HEPATIC FUNCTION PANEL Collection Time: 08/02/19  3:38 AM  
Result Value Ref Range Protein, total 4.7 (L) 6.4 - 8.2 g/dL Albumin 2.3 (L) 3.4 - 5.0 g/dL Globulin 2.4 2.0 - 4.0 g/dL A-G Ratio 1.0 0.8 - 1.7 Bilirubin, total 15.6 (H) 0.2 - 1.0 MG/DL Bilirubin, direct 10.9 (H) 0.0 - 0.2 MG/DL Alk. phosphatase 91 45 - 117 U/L  
 AST (SGOT) 41 (H) 10 - 38 U/L  
 ALT (SGPT) 23 13 - 56 U/L  
GLUCOSE, POC Collection Time: 08/02/19  9:10 AM  
Result Value Ref Range Glucose (POC) 175 (H) 70 - 110 mg/dL GLUCOSE, POC Collection Time: 08/02/19 12:28 PM  
Result Value Ref Range Glucose (POC) 134 (H) 70 - 110 mg/dL GLUCOSE, POC Collection Time: 08/02/19  4:33 PM  
Result Value Ref Range Glucose (POC) 106 70 - 110 mg/dL GLUCOSE, POC Collection Time: 08/02/19 10:11 PM  
Result Value Ref Range Glucose (POC) 119 (H) 70 - 110 mg/dL Signed By: Ashanti Pulido MD   
 August 2, 2019

## 2019-08-03 NOTE — PROGRESS NOTES
Bedside and Verbal shift change report given to [address] [place] [address] (oncoming nurse) by Mani Shukla (offgoing nurse). Report included the following information SBAR, Kardex and Intake/Output.  2

## 2019-08-03 NOTE — ROUTINE PROCESS
Bedside shift change report given to Enrique Keys RN and Bear Gil RN (oncoming nurse) by Anjali Wheeler RN (offgoing nurse). Report included the following information SBAR, Kardex, Intake/Output, MAR, Recent Results and Quality Measures.

## 2019-08-03 NOTE — PROGRESS NOTES
PT eval order received and chart reviewed. Unable to see patient at this time as patient is not following commands and does not respond to questions. Nursing notified and reports with repositioning in bed, patient not assisting on command. Patient does demo volitional movements such as rolling in bed or flexing knees to chest in fetal position. Will follow up as patient schedule allows. Thank you for this referral. Dorenda Crigler, PT, DPT.

## 2019-08-03 NOTE — PROGRESS NOTES
During report, patient was said to be AO X 4, upon assessment, patient seems to localize pain, does not verbally respond to questions, seems to be alert to self only,

## 2019-08-04 NOTE — PROGRESS NOTES
0710 Bedside, verbal report received from [place]. 0800 Physical assessment completed at this time. Pt hypotensive, simon SINCLAIR , received orders for bolus. 0900 BP normalized. 1910 Bedside verbal shift change report given to Caity (oncoming nurse) by Janak Gilmore (offgoing nurse). Report included the following information SBAR, Kardex, Intake/Output and MAR.

## 2019-08-04 NOTE — PROGRESS NOTES
Pt has worsening mental status Tachy at 120 with mild hypotension Plan Will get basic lab Ammonia and lipase Hydrocortisone x 1 Albumin 25 g Q6 x 4BG CXR Will transfer to step down for now

## 2019-08-04 NOTE — PROGRESS NOTES
Problem: Pressure Injury - Risk of 
Goal: *Prevention of pressure injury Description Document Tyson Scale and appropriate interventions in the flowsheet. Outcome: Progressing Towards Goal 
Note:  
Pressure Injury Interventions: 
Sensory Interventions: Assess changes in LOC, Float heels, Monitor skin under medical devices, Minimize linen layers, Suspension boots, Turn and reposition approx. every two hours (pillows and wedges if needed) Moisture Interventions: Absorbent underpads, Minimize layers, Check for incontinence Q2 hours and as needed Activity Interventions: Assess need for specialty bed Mobility Interventions: Assess need for specialty bed, Float heels, PT/OT evaluation, Turn and reposition approx. every two hours(pillow and wedges) Nutrition Interventions: Document food/fluid/supplement intake Friction and Shear Interventions: Apply protective barrier, creams and emollients, Minimize layers, Transferring/repositioning devices Problem: Patient Education: Go to Patient Education Activity Goal: Patient/Family Education Outcome: Progressing Towards Goal 
  
Problem: Falls - Risk of 
Goal: *Absence of Falls Description Document Margrett Bernheim Fall Risk and appropriate interventions in the flowsheet. Outcome: Progressing Towards Goal 
Note:  
Fall Risk Interventions: 
Mobility Interventions: Assess mobility with egress test, Bed/chair exit alarm Mentation Interventions: Adequate sleep, hydration, pain control, Bed/chair exit alarm, More frequent rounding, Increase mobility, Toileting rounds Medication Interventions: Bed/chair exit alarm Elimination Interventions: Bed/chair exit alarm, Call light in reach, Elevated toilet seat, Toilet paper/wipes in reach, Toileting schedule/hourly rounds History of Falls Interventions: Bed/chair exit alarm Problem: Patient Education: Go to Patient Education Activity Goal: Patient/Family Education Outcome: Progressing Towards Goal 
  
Problem: Pain Goal: *Control of Pain Outcome: Progressing Towards Goal 
  
Problem: General Infection Care Plan (Adult and Pediatric) Goal: Improvement in signs and symptoms of infection Outcome: Progressing Towards Goal 
Goal: *Optimize nutritional status Outcome: Progressing Towards Goal 
  
Problem: Nutrition Deficit Goal: *Optimize nutritional status Outcome: Progressing Towards Goal 
  
Problem: Risk for Spread of Infection Goal: Prevent transmission of infectious organism to others Description Prevent the transmission of infectious organisms to other patients, staff members, and visitors. Outcome: Progressing Towards Goal 
  
Problem: Patient Education:  Go to Education Activity Goal: Patient/Family Education Outcome: Progressing Towards Goal 
  
Problem: Altered Thought Process (Adult/Pediatric) Goal: *STG: Remains safe in hospital 
Outcome: Progressing Towards Goal 
Goal: Interventions Outcome: Progressing Towards Goal

## 2019-08-04 NOTE — PROGRESS NOTES
[facility] Hospitalist Group Progress Note Patient: Marium Moss Age: 61 y.o. : 1955 MR#: [id number] SSN: [id number] Date/Time: 8/3/2019 Subjective:  
 
Pt admitted to hosp for elevated Bilirubin & obstructive jaundice in the setting of H/o Pancreatic carcinoma - s/p liver Bx but T Bili remains elevated Assessment/Plan: 1. Obstructive jaundice with hyperbilirubinemia - Has had whipple's procedure done -  GI consulted , MRCP report reviewed - CT negative for obstruction 2. Hepatology note reviewed - Vit K 10mg x 3 for 3 days - Liver bx done by IR today - await results - no improvement in INR despite Vit K  
3. H/o Pancreatic cancer - start creon when able to tolerate - Oncology on board  - CTA chest done 2y to Tachycardia - negative 4. Chronic anemia - monitor H&H   
5. Mild elevation in LFT's - monitor 6. Tachycardia - Cardiology consult in AM , Echo results reviewed - continue current Rx Plan 7. Urinary retention - perez placed DVT px - elevated INR  
FC  
  
Pt's T bili is not improving - await Liver Bx - may consider transfer at this point if no improvement - will discuss with consultants involved & follow Case discussed with:  [x]Patient  []Family  []Nursing  []Case Management DVT Prophylaxis:  []Lovenox  []Hep SQ  []SCDs  []Coumadin   []On Heparin gtt Objective:  
VS:  
Visit Vitals /67 (BP 1 Location: Left arm, BP Patient Position: At rest) Pulse 60 Temp 97.9 °F (36.6 °C) Resp 16 Ht 4' 6\" (1.372 m) Wt 62.2 kg (137 lb 3.2 oz) SpO2 100% Breastfeeding? No  
BMI 33.08 kg/m² Tmax/24hrs: Temp (24hrs), Av.9 °F (36.6 °C), Min:97.5 °F (36.4 °C), Max:98.8 °F (37.1 °C) IOBRIEF Intake/Output Summary (Last 24 hours) at 8/3/2019 2107 Last data filed at 8/3/2019 1800 Gross per 24 hour Intake  Output 400 ml Net -400 ml General:  Alert, cooperative, no acute distress HEENT: PERRLA, icteric sclera & skin . Pulmonary:  CTA Bilaterally. No Wheezing/Rhonchi/Rales. Cardiovascular: Regular rate and Rhythm. GI:  Soft, Non distended, Non tender. + Bowel sounds. Extremities:  No edema, cyanosis, clubbing. No calf tenderness. Neurologic: Alert and oriented X 3. Slightly sluggish today Additional: 
 
Medications:  
Current Facility-Administered Medications Medication Dose Route Frequency  [START ON 8/4/2019] phytonadione (vitamin K1) (AQUA-MEPHYTON) 10 mg in 0.9% sodium chloride 50 mL IVPB  10 mg IntraVENous DAILY  acetaminophen (TYLENOL) tablet 500 mg  500 mg Oral PRN  
 diphenhydrAMINE (BENADRYL) capsule 25 mg  25 mg Oral PRN  
 sodium chloride (NS) flush 5-40 mL  5-40 mL IntraVENous Q8H  
 sodium chloride (NS) flush 5-40 mL  5-40 mL IntraVENous PRN  
 gelatin adsorbable (GELFOAM) 12-7 mm sponge 1 Each  1 Each Topical PRN  
 flumazenil (ROMAZICON) 0.1 mg/mL injection 0.2 mg  0.2 mg IntraVENous Multiple  naloxone (NARCAN) injection 0.1 mg  0.1 mg IntraVENous Multiple  morphine injection 1 mg  1 mg IntraVENous Q8H PRN  
 dextrose 5% infusion  100 mL/hr IntraVENous CONTINUOUS  
 sodium chloride (NS) flush 5-10 mL  5-10 mL IntraVENous PRN  
 busPIRone (BUSPAR) tablet 10 mg  10 mg Oral TID  pantoprazole (PROTONIX) 40 mg in sodium chloride 0.9% 10 mL injection  40 mg IntraVENous Q12H  
 insulin lispro (HUMALOG) injection   SubCUTAneous AC&HS  
 glucose chewable tablet 16 g  4 Tab Oral PRN  
 glucagon (GLUCAGEN) injection 1 mg  1 mg IntraMUSCular PRN  
 dextrose 10% infusion 125-250 mL  125-250 mL IntraVENous PRN  
 furosemide (LASIX) injection 40 mg  40 mg IntraVENous PRN Labs:   
Recent Results (from the past 24 hour(s)) GLUCOSE, POC Collection Time: 08/02/19 10:11 PM  
Result Value Ref Range Glucose (POC) 119 (H) 70 - 110 mg/dL HEPATIC FUNCTION PANEL Collection Time: 08/03/19  6:15 AM  
Result Value Ref Range Protein, total 4.6 (L) 6.4 - 8.2 g/dL Albumin 2.1 (L) 3.4 - 5.0 g/dL Globulin 2.5 2.0 - 4.0 g/dL A-G Ratio 0.8 0.8 - 1.7 Bilirubin, total 15.3 (H) 0.2 - 1.0 MG/DL Bilirubin, direct 10.0 (H) 0.0 - 0.2 MG/DL Alk. phosphatase 91 45 - 117 U/L  
 AST (SGOT) 49 (H) 10 - 38 U/L  
 ALT (SGPT) 24 13 - 56 U/L  
PTT Collection Time: 08/03/19  7:03 AM  
Result Value Ref Range aPTT 67.1 (H) 23.0 - 36.4 SEC PROTHROMBIN TIME + INR Collection Time: 08/03/19  7:03 AM  
Result Value Ref Range Prothrombin time 25.1 (H) 11.5 - 15.2 sec INR 2.3 (H) 0.8 - 1.2 FIBRINOGEN Collection Time: 08/03/19  7:03 AM  
Result Value Ref Range Fibrinogen 105 (L) 210 - 451 mg/dL CBC WITH AUTOMATED DIFF Collection Time: 08/03/19  7:03 AM  
Result Value Ref Range WBC 6.1 4.6 - 13.2 K/uL  
 RBC 2.15 (L) 4.20 - 5.30 M/uL HGB 7.7 (L) 12.0 - 16.0 g/dL HCT 22.0 (L) 35.0 - 45.0 % .3 (H) 74.0 - 97.0 FL  
 MCH 35.8 (H) 24.0 - 34.0 PG  
 MCHC 35.0 31.0 - 37.0 g/dL  
 RDW 17.0 (H) 11.6 - 14.5 % PLATELET 776 (L) 869 - 420 K/uL MPV 11.2 9.2 - 11.8 FL  
 NEUTROPHILS 66 40 - 73 % LYMPHOCYTES 18 (L) 21 - 52 % MONOCYTES 15 (H) 3 - 10 % EOSINOPHILS 1 0 - 5 % BASOPHILS 0 0 - 2 %  
 ABS. NEUTROPHILS 4.0 1.8 - 8.0 K/UL  
 ABS. LYMPHOCYTES 1.1 0.9 - 3.6 K/UL  
 ABS. MONOCYTES 0.9 0.05 - 1.2 K/UL  
 ABS. EOSINOPHILS 0.1 0.0 - 0.4 K/UL  
 ABS. BASOPHILS 0.0 0.0 - 0.1 K/UL  
 DF AUTOMATED    
GLUCOSE, POC Collection Time: 08/03/19  8:40 AM  
Result Value Ref Range Glucose (POC) 123 (H) 70 - 110 mg/dL GLUCOSE, POC Collection Time: 08/03/19 12:32 PM  
Result Value Ref Range Glucose (POC) 116 (H) 70 - 110 mg/dL GLUCOSE, POC Collection Time: 08/03/19  3:44 PM  
Result Value Ref Range Glucose (POC) 105 70 - 110 mg/dL Signed By: Tabatha Webb MD   
 August 3, 2019

## 2019-08-04 NOTE — PROGRESS NOTES
[facility] Hospitalist Group Progress Note Patient: Estrada Day Age: 61 y.o. : 1955 MR#: [id number] SSN: [id number] Date: 2019 Subjective/24-hour events:  
 
Transferred to ICU as stepdown-overflow patient eariler this AM due to hypotension and worsening MS. BPs stable currently, patient still altered but more responsive than at time of transfer per nursing. Assessment: Hyperammonemia with acute hepatic encephalopathy Lactic acidosis Coagulopathy Hypokalemia Severe anemia, macrocytic Adenocarcinoma of pancreas s/p Whipple and adjuvant XRT 
DM 2 Depression/anxiety Non-ambulatory Obesity Plan:  
Scheduled lactulose for now and trend levels. Suspect worsening MS likely related primarily to significant ammonia elevation. Empiric antibiotic therapy to continue. Blood cultures were not obtained when ABX initailly started - will order. Also add vancomycin per pharmacy dosing. Continue to follow serial lactic acid levels. CT from  reviewed. Consider repeat CT of abdomen/pelvis but will hold off for now. Abdominal exam not particularly concerning. Continue IV and albumin. No acute need for vasoactive medications at this point as pressures have improved/stabilized somewhat. Transfuse PRBCs and follow up H/H post-transfusion. Monitor lytes/replace as needed. Discussed case with ICU team this AM.  Will hold off on assuming care for now but are available if she deteriorates. Sister updated via phone and provided consent for transfusion. Questions answered satisfactorily. May need to address goals of care going forward but she is to remain full code for now. Case discussed with:  []Patient  [x]Family  [x]Nursing  []Case Management DVT Prophylaxis:  []Lovenox  []Hep SQ  [x]SCDs  []Coumadin   []On Heparin gtt Objective:  
VS:  
Visit Vitals /68 Pulse (!) 104 Temp 97.3 °F (36.3 °C) Resp 20 Ht 4' 6\" (1.372 m) Wt 66.3 kg (146 lb 2.6 oz) SpO2 100% Breastfeeding? No  
BMI 35.24 kg/m² Tmax/24hrs: Temp (24hrs), Av.6 °F (36.4 °C), Min:97.2 °F (36.2 °C), Max:98 °F (36.7 °C) Intake/Output Summary (Last 24 hours) at 2019 1145 Last data filed at 2019 1045 Gross per 24 hour Intake 6597.5 ml Output 2060 ml Net 4537.5 ml  
 
 
General:  In NAD. Nontoxic-appearing. Cardiovascular:  S1, S2. Mildly tachy. Pulmonary:  No wheezes, effort nonlabored. GI:  Abdomen soft, NTTP. Extremities:  Warm, no ischemia. Neuro:  Sleepy but arousable. Confused and nonverbal currently. Labs:   
Recent Results (from the past 24 hour(s)) GLUCOSE, POC Collection Time: 19 12:32 PM  
Result Value Ref Range Glucose (POC) 116 (H) 70 - 110 mg/dL GLUCOSE, POC Collection Time: 19  3:44 PM  
Result Value Ref Range Glucose (POC) 105 70 - 110 mg/dL GLUCOSE, POC Collection Time: 19  9:33 PM  
Result Value Ref Range Glucose (POC) 158 (H) 70 - 110 mg/dL AMMONIA Collection Time: 19 10:24 PM  
Result Value Ref Range Ammonia 134 (H) 11 - 32 UMOL/L  
LACTIC ACID Collection Time: 19 10:24 PM  
Result Value Ref Range Lactic acid 4.4 (HH) 0.4 - 2.0 MMOL/L  
METABOLIC PANEL, COMPREHENSIVE Collection Time: 19 10:24 PM  
Result Value Ref Range Sodium 137 136 - 145 mmol/L Potassium 3.3 (L) 3.5 - 5.5 mmol/L Chloride 105 100 - 111 mmol/L  
 CO2 23 21 - 32 mmol/L Anion gap 9 3.0 - 18 mmol/L Glucose 142 (H) 74 - 99 mg/dL BUN 5 (L) 7.0 - 18 MG/DL Creatinine 0.77 0.6 - 1.3 MG/DL  
 BUN/Creatinine ratio 6 (L) 12 - 20 GFR est AA >60 >60 ml/min/1.73m2 GFR est non-AA >60 >60 ml/min/1.73m2 Calcium 8.2 (L) 8.5 - 10.1 MG/DL Bilirubin, total 15.6 (H) 0.2 - 1.0 MG/DL  
 ALT (SGPT) 24 13 - 56 U/L  
 AST (SGOT) 44 (H) 10 - 38 U/L Alk. phosphatase 99 45 - 117 U/L Protein, total 4.6 (L) 6.4 - 8.2 g/dL Albumin 2.1 (L) 3.4 - 5.0 g/dL Globulin 2.5 2.0 - 4.0 g/dL A-G Ratio 0.8 0.8 - 1.7 LIPASE Collection Time: 08/03/19 10:24 PM  
Result Value Ref Range Lipase <10 (L) 73 - 393 U/L  
POC G3 Collection Time: 08/03/19 10:33 PM  
Result Value Ref Range Device: ROOM AIR    
 FIO2 (POC) 0.21 % pH (POC) 7.475 (H) 7.35 - 7.45    
 pCO2 (POC) 29.5 (L) 35.0 - 45.0 MMHG  
 pO2 (POC) 95 80 - 100 MMHG  
 HCO3 (POC) 21.7 (L) 22 - 26 MMOL/L  
 sO2 (POC) 98 (H) 92 - 97 % Base deficit (POC) 2 mmol/L Allens test (POC) YES Total resp. rate 20 Site RIGHT RADIAL Patient temp. 37.0 Specimen type (POC) ARTERIAL Performed by Lovely Vogel TYPE & SCREEN Collection Time: 08/03/19 11:30 PM  
Result Value Ref Range Crossmatch Expiration 08/06/2019 ABO/Rh(D) O POSITIVE Antibody screen NEG   
 CALLED TO: CCU AMITA MUNOZ ON [id number] AT 0938 BY LAURITA Unit number [id number] Blood component type RC LR Unit division 00 Status of unit ALLOCATED Crossmatch result Compatible GLUCOSE, POC Collection Time: 08/04/19  5:10 AM  
Result Value Ref Range Glucose (POC) 142 (H) 70 - 110 mg/dL PTT Collection Time: 08/04/19  7:14 AM  
Result Value Ref Range aPTT 74.2 (H) 23.0 - 36.4 SEC PROTHROMBIN TIME + INR Collection Time: 08/04/19  7:14 AM  
Result Value Ref Range Prothrombin time 30.0 (H) 11.5 - 15.2 sec INR 2.8 (H) 0.8 - 1.2 FIBRINOGEN Collection Time: 08/04/19  7:14 AM  
Result Value Ref Range Fibrinogen 86 (L) 210 - 451 mg/dL CBC WITH AUTOMATED DIFF Collection Time: 08/04/19  7:14 AM  
Result Value Ref Range WBC 4.5 (L) 4.6 - 13.2 K/uL  
 RBC 1.90 (L) 4.20 - 5.30 M/uL HGB 6.8 (L) 12.0 - 16.0 g/dL HCT 19.3 (L) 35.0 - 45.0 % .6 (H) 74.0 - 97.0 FL  
 MCH 35.8 (H) 24.0 - 34.0 PG  
 MCHC 35.2 31.0 - 37.0 g/dL  
 RDW 17.2 (H) 11.6 - 14.5 % PLATELET 630 (L) 298 - 420 K/uL MPV 11.0 9.2 - 11.8 FL  
 NEUTROPHILS 86 (H) 40 - 73 % LYMPHOCYTES 8 (L) 21 - 52 % MONOCYTES 6 3 - 10 % EOSINOPHILS 0 0 - 5 % BASOPHILS 0 0 - 2 %  
 ABS. NEUTROPHILS 3.9 1.8 - 8.0 K/UL  
 ABS. LYMPHOCYTES 0.4 (L) 0.9 - 3.6 K/UL  
 ABS. MONOCYTES 0.3 0.05 - 1.2 K/UL  
 ABS. EOSINOPHILS 0.0 0.0 - 0.4 K/UL  
 ABS. BASOPHILS 0.0 0.0 - 0.1 K/UL  
 DF AUTOMATED METABOLIC PANEL, COMPREHENSIVE Collection Time: 08/04/19  7:14 AM  
Result Value Ref Range Sodium 140 136 - 145 mmol/L Potassium 3.4 (L) 3.5 - 5.5 mmol/L Chloride 106 100 - 111 mmol/L  
 CO2 22 21 - 32 mmol/L Anion gap 12 3.0 - 18 mmol/L Glucose 163 (H) 74 - 99 mg/dL BUN 5 (L) 7.0 - 18 MG/DL Creatinine 0.79 0.6 - 1.3 MG/DL  
 BUN/Creatinine ratio 6 (L) 12 - 20 GFR est AA >60 >60 ml/min/1.73m2 GFR est non-AA >60 >60 ml/min/1.73m2 Calcium 8.6 8.5 - 10.1 MG/DL Bilirubin, total 16.5 (H) 0.2 - 1.0 MG/DL  
 ALT (SGPT) 21 13 - 56 U/L  
 AST (SGOT) 37 10 - 38 U/L Alk. phosphatase 90 45 - 117 U/L Protein, total 5.1 (L) 6.4 - 8.2 g/dL Albumin 3.1 (L) 3.4 - 5.0 g/dL Globulin 2.0 2.0 - 4.0 g/dL A-G Ratio 1.6 0.8 - 1.7 LACTIC ACID Collection Time: 08/04/19  7:14 AM  
Result Value Ref Range Lactic acid 5.6 (HH) 0.4 - 2.0 MMOL/L  
URINALYSIS W/MICROSCOPIC Collection Time: 08/04/19  9:10 AM  
Result Value Ref Range Color DARK YELLOW Appearance CLOUDY Specific gravity 1.026 1.005 - 1.030    
 pH (UA) 5.5 5.0 - 8.0 Protein 100 (A) NEG mg/dL Glucose NEGATIVE  NEG mg/dL Ketone NEGATIVE  NEG mg/dL Bilirubin LARGE (A) NEG Blood LARGE (A) NEG Urobilinogen 1.0 0.2 - 1.0 EU/dL Nitrites NEGATIVE  NEG Leukocyte Esterase MODERATE (A) NEG    
 WBC 4 to 10 0 - 5 /hpf  
 RBC 4 to 10 0 - 5 /hpf Epithelial cells FEW 0 - 5 /lpf Bacteria FEW (A) NEG /hpf  
 Granular cast 1 to 4 NEG /lpf Yeast 3+ (A) NEG  
AMMONIA Collection Time: 08/04/19  9:56 AM  
Result Value Ref Range  Ammonia 53 (H) 11 - 32 UMOL/L  
 GLUCOSE, POC Collection Time: 08/04/19 11:43 AM  
Result Value Ref Range Glucose (POC) 183 (H) 70 - 110 mg/dL Signed By: Suzanna Salter MD   
 August 4, 2019

## 2019-08-04 NOTE — PROGRESS NOTES
Problem: Mobility Impaired (Adult and Pediatric) Goal: *Acute Goals and Plan of Care (Insert Text) Description Physical Therapy Goals Initiated 8/4/2019 and to be accomplished within 7 day(s) 1. Patient will participate in functional maintenance program for PROM/AAROM B UE and LE 3-5 times a week. Prior Level of Function: Unknown. Outcome: Progressing Towards Goal 
PHYSICAL THERAPY EVALUATION Patient: Vandana Spaulding [de-identified]61 y.o. female) Date: 8/4/2019 Primary Diagnosis: Hypokalemia [E87.6] Jaundice [R17] Precautions:   Fall ASSESSMENT : 
PT orders received and patient cleared by nursing to participate with therapy. Patient is a 61 y.o. female admitted to the hospital due to weakness and abnormal labs. Nursing consents to PT evaluation and treatment. Pt is currently nonverbal and unable to follow commands. Pt has decreased PROM of B LE and B UE. Pt has decreased/nonfunctional AROM and strength in B LE and B UE noted during evaluation. Pt has swelling noted in B hands and B feet/ankles (increased swelling of R ankle). Pillow support for B UE to decrease swelling with nursing informed and pt has B heel protectors with ankle DF support donned. Pt ended therapy supine in bed with all needs met. Rehab technician educated on functional maintenance program for pt for B UE and B LE PROM/AAROM. Patient will benefit from skilled intervention to address the above impairments. Patient's rehabilitation potential is considered to be Guarded Factors which may influence rehabilitation potential include:    
? None noted ? Mental ability/status ? Medical condition ? Home/family situation and support systems ? Safety awareness 
? Pain tolerance/management 
? Other: PLAN : 
Recommendations and Planned Interventions:   
?           Bed Mobility Training             ? Neuromuscular Re-Education ?           Transfer Training                   ? Orthotic/Prosthetic Training 
? Gait Training                          ? Modalities ? Therapeutic Exercises           ? Edema Management/Control ? Therapeutic Activities            ? Family Training/Education ? Patient Education ? Other (comment): Frequency/Duration: Patient will be followed by physical therapy rehab technician 3-5 times a week Monday through Friday to address goals. Discharge Recommendations: LTC Further Equipment Recommendations for Discharge: N/A  
 
SUBJECTIVE:  
Patient is nonverbal 
 
OBJECTIVE DATA SUMMARY:  
 
Past Medical History:  
Diagnosis Date  Asthma  [facility] 11/13/2018 Pancreatic cancer  Diabetes ([facility]  Hypertension  Hypoalbuminemia due to protein-calorie malnutrition ([facility] 7/26/2019  Ill-defined condition \"nerve problem\"  Pancreatic adenocarcinoma ([facility]  Syphilis Past Surgical History:  
Procedure Laterality Date  BREAST SURGERY PROCEDURE UNLISTED    
 unsure of side-benign, lumpectomy  COLONOSCOPY N/A 9/27/2016 COLONOSCOPY with polypectomy. performed by Tangela Gray MD at [facility] GYN    
 patient unsure of surgery Barriers to Learning/Limitations: yes;  sensory deficits-vision/hearing/speech and altered mental status (i.e.Sedation, Confusion) Compensate with: Visual Cues, Verbal Cues and Tactile Cues Home Situation: 
Home Situation Home Environment: Apartment(pt states, \"I am currently living with my sister in law\") # Steps to Enter: 0 One/Two Story Residence: One story Living Alone: No 
Support Systems: Family member(s) Patient Expects to be Discharged to[de-identified] Unknown Current DME Used/Available at Home: None Critical Behavior: 
Neurologic State: Eyes open spontaneously Orientation Level: Unable to verbalize Cognition: No command following Safety/Judgement: Fall prevention Psychosocial 
Patient Behaviors: Withdrawn Purposeful Interaction: Yes Pt Identified Daily Priority: Clinical issues (comment); Communication issues (comment) Caritas Process: Nurture loving kindness;Create healing environment; Attend basic human needs Caring Interventions: Therapeutic modalities; Reassure Reassure: Instilling izabella and hope;Caring rounds Therapeutic Modalities: Intentional therapeutic touch Other Caring Modalities: Hourly Rounds Skin Condition/Temp: Warm;Dry Skin Integrity: Scars (comment) Skin Integumentary Skin Color: Appropriate for ethnicity Skin Condition/Temp: Warm;Dry Skin Integrity: Scars (comment) Turgor: Non-tenting Hair Growth: Present Varicosities: Absent B LE Strength:   
Strength: Grossly decreased, non-functional             
B LE Tone & Sensation:  
Tone: Abnormal         
           
B LE Range Of Motion: 
AROM: Grossly decreased, non-functional       
PROM: Generally decreased, functional       
Functional Mobility: 
Bed Mobility: 
 total assistance Therapeutic Exercises:  
Performed B LE and B UE PROM in all planes 5-10 reps. Pain: No pain noted before, during, or at end of session. Activity Tolerance:  
poor Please refer to the flowsheet for vital signs taken during this treatment. After treatment:  
?         Patient left in no apparent distress sitting up in chair ? Patient left in no apparent distress in bed 
? Call bell left within reach ? Personal items in reach ? Nursing notified ? Caregiver present ? Bed/chair alarm activated ? SCDs applied COMMUNICATION/EDUCATION:  
?         Role of Physical Therapy in the acute care setting. ?         Fall prevention education was provided. ? Patient/family have participated as able in goal setting and plan of care. ?         Patient/family agree to work toward stated goals and plan of care. ?         Patient understands intent and goals of therapy, but is neutral about his/her participation. ? Patient is unable to participate in goal setting/plan of care: ongoing with therapy staff. ?         Out of bed with nursing assistance 3-5 times a day. ? Other: Thank you for this referral. 
Cindy Lenz, PT, DPT Time Calculation: 15 mins Eval Complexity: History: HIGH Complexity :3+ comorbidities / personal factors will impact the outcome/ POC Exam:HIGH Complexity : 4+ Standardized tests and measures addressing body structure, function, activity limitation and / or participation in recreation  Presentation: MEDIUM Complexity : Evolving with changing characteristics  Clinical Decision Making:Medium Complexity    Overall Complexity:MEDIUM

## 2019-08-04 NOTE — PROGRESS NOTES
2145- Spoke to Dr. Marylen Hummingbird at bedside concerning MEWS of 3. Labs ordered and will continue to monitor. 2312- Spoke to Dr. Marylen Hummingbird concerning MEWS of 4, including abnormal lab results. Received transfer order. Spoke to [facility] ([employer]), received room number for transfer.

## 2019-08-04 NOTE — ROUTINE PROCESS
TRANSFER - OUT REPORT: 
 
Verbal report given to Bashir Christianson RN (name) on Khai Beltran  being transferred to ICU (unit) for change in patient condition(Worsening mental status, tachycardia, hypotension, abnormal labs) Report consisted of patients Situation, Background, Assessment and  
Recommendations(SBAR). Information from the following report(s) SBAR, Kardex, Intake/Output, MAR, Recent Results and Quality Measures was reviewed with the receiving nurse. Opportunity for questions and clarification was provided. Patient transported with: 
 Monitor O2 @ 2 liters Registered Nurse Tech

## 2019-08-04 NOTE — PROGRESS NOTES
[facility] 
 
 
CARDIOLOGY PROGRESS NOTE 
RECS: 
1. Sinus tachycardia-  Treat underlying cause. Recent echo showed hyperdynamic systolic function with EF 65%-70%- will continue to monitor 2. Borderline BP- was hypotensive. Will monitor 3. Pancreatic adenocarcinoma s/p Whipple procedure 4/2019 with adjuvant radiation in [facility]- oncology following 4. Hyperbilirubinemia with jaundice -GI following  
5. Severe Anemia-no active bleeding. Transfuse as needed. monitor H&H 6. Diabetes- medications per medical team  
7. Hx anxiety/depression- medicine following 8. Deconditioning- non ambulatory 9. Coagulopathy- was given Vitamin K iv. S/p liver biopsy 8/1/19   
10. Edema-improved. Likely related to third spacing from multiple problems including hypoalbuminemia. Lasix as needed if BP tolerates it.  
11. Altered mental status - transferred to ICU overnight with increased ammonia levels 12. Hypokalemia - replete per protocol ASSESSMENT: 
Hospital Problems  Date Reviewed: 7/17/2019 Codes Class Noted POA Hypoalbuminemia due to protein-calorie malnutrition ([facility] ICD-10-CM: E46 
ICD-9-CM: 263.9  7/26/2019 Yes Hypokalemia ICD-10-CM: E87.6 ICD-9-CM: 276.8  7/25/2019 Yes Jaundice ICD-10-CM: R17 
ICD-9-CM: 782.4  7/25/2019 Yes * (Principal) Obstructive hyperbilirubinemia (Chronic) ICD-10-CM: L93.1 ICD-9-CM: 576.8  7/4/2019 Yes Pancreatic adenocarcinoma (HCC) (Chronic) ICD-10-CM: C25.9 ICD-9-CM: 157.9  11/19/2018 Yes SUBJECTIVE: 
Does not respond to questions OBJECTIVE: 
 
VS:  
Visit Vitals /61 Pulse 90 Temp 97.3 °F (36.3 °C) Resp 20 Ht 4' 6\" (1.372 m) Wt 66.3 kg (146 lb 2.6 oz) SpO2 100% Breastfeeding? No  
BMI 35.24 kg/m² Intake/Output Summary (Last 24 hours) at 8/4/2019 1108 Last data filed at 8/4/2019 0800 Gross per 24 hour Intake 5797.5 ml Output 1160 ml Net 4637.5 ml  
 
 TELE: sinus rhythm General: in no apparent distress and does not respond to commands HENT: Normocephalic, atraumatic. Icteric sclera and skin Neck :  no JVD Cardiac:  regular rate and rhythm, S1, S2 normal, no murmur, click, rub or gallop Lungs: clear to auscultation bilaterally Abdomen: Soft, nontender, no masses Extremities:  No c/c/e, peripheral pulses present Labs: Results:  
   
Chemistry Recent Labs 08/04/19 
3811 08/03/19 
2224 08/03/19 
9308 * 142*  --   
 137  --   
K 3.4* 3.3*  --   
 105  --   
CO2 22 23  --   
BUN 5* 5*  --   
CREA 0.79 0.77  --   
CA 8.6 8.2*  --   
AGAP 12 9  --   
BUCR 6* 6*  --   
AP 90 99 91  
TP 5.1* 4.6* 4.6* ALB 3.1* 2.1* 2.1*  
GLOB 2.0 2.5 2.5 AGRAT 1.6 0.8 0.8 CBC w/Diff Recent Labs 08/04/19 
0669 08/03/19 
0703 08/02/19 
8404 WBC 4.5* 6.1 4.3*  
RBC 1.90* 2.15* 1.99* HGB 6.8* 7.7* 7.0*  
HCT 19.3* 22.0* 20.5* * 124* 113* GRANS 86* 66 64 LYMPH 8* 18* 19* EOS 0 1 2 Cardiac Enzymes No results for input(s): CPK, CKND1, SANDIE in the last 72 hours. No lab exists for component: Shilpa Martinez Coagulation Recent Labs 08/04/19 
9477 08/03/19 
0703 PTP 30.0* 25.1* INR 2.8* 2.3* APTT 74.2* 67.1* Lipid Panel Lab Results Component Value Date/Time Cholesterol, total 102 05/08/2019 09:08 AM  
 HDL Cholesterol 48 05/08/2019 09:08 AM  
 LDL, calculated 42.4 05/08/2019 09:08 AM  
 VLDL, calculated 11.6 05/08/2019 09:08 AM  
 Triglyceride 58 05/08/2019 09:08 AM  
 CHOL/HDL Ratio 2.1 05/08/2019 09:08 AM  
  
BNP No results for input(s): BNPP in the last 72 hours. Liver Enzymes Recent Labs 08/04/19 
7431 TP 5.1* ALB 3.1* AP 90 SGOT 37 Thyroid Studies Lab Results Component Value Date/Time TSH 1.85 08/01/2019 05:45 AM  
    
 
 
 
Hannah Lee NP   Pager # [phone] I have independently evaluated taken history and examined the patient. All relevant labs and testing data's are reviewed. Care plan discussed and updated after review.  
Thu Jiang MD

## 2019-08-04 NOTE — PROGRESS NOTES
Worsening mental status and general condition. PE:  
Visit Vitals /61 Pulse 90 Temp 97.3 °F (36.3 °C) Resp 20 Ht 4' 6\" (1.372 m) Wt 66.3 kg (146 lb 2.6 oz) SpO2 100% Breastfeeding? No  
BMI 35.24 kg/m² Recent Results (from the past 12 hour(s)) GLUCOSE, POC Collection Time: 08/03/19  9:33 PM  
Result Value Ref Range Glucose (POC) 158 (H) 70 - 110 mg/dL AMMONIA Collection Time: 08/03/19 10:24 PM  
Result Value Ref Range Ammonia 134 (H) 11 - 32 UMOL/L  
LACTIC ACID Collection Time: 08/03/19 10:24 PM  
Result Value Ref Range Lactic acid 4.4 (HH) 0.4 - 2.0 MMOL/L  
METABOLIC PANEL, COMPREHENSIVE Collection Time: 08/03/19 10:24 PM  
Result Value Ref Range Sodium 137 136 - 145 mmol/L Potassium 3.3 (L) 3.5 - 5.5 mmol/L Chloride 105 100 - 111 mmol/L  
 CO2 23 21 - 32 mmol/L Anion gap 9 3.0 - 18 mmol/L Glucose 142 (H) 74 - 99 mg/dL BUN 5 (L) 7.0 - 18 MG/DL Creatinine 0.77 0.6 - 1.3 MG/DL  
 BUN/Creatinine ratio 6 (L) 12 - 20 GFR est AA >60 >60 ml/min/1.73m2 GFR est non-AA >60 >60 ml/min/1.73m2 Calcium 8.2 (L) 8.5 - 10.1 MG/DL Bilirubin, total 15.6 (H) 0.2 - 1.0 MG/DL  
 ALT (SGPT) 24 13 - 56 U/L  
 AST (SGOT) 44 (H) 10 - 38 U/L Alk. phosphatase 99 45 - 117 U/L Protein, total 4.6 (L) 6.4 - 8.2 g/dL Albumin 2.1 (L) 3.4 - 5.0 g/dL Globulin 2.5 2.0 - 4.0 g/dL A-G Ratio 0.8 0.8 - 1.7 LIPASE Collection Time: 08/03/19 10:24 PM  
Result Value Ref Range Lipase <10 (L) 73 - 393 U/L  
POC G3 Collection Time: 08/03/19 10:33 PM  
Result Value Ref Range Device: ROOM AIR    
 FIO2 (POC) 0.21 % pH (POC) 7.475 (H) 7.35 - 7.45    
 pCO2 (POC) 29.5 (L) 35.0 - 45.0 MMHG  
 pO2 (POC) 95 80 - 100 MMHG  
 HCO3 (POC) 21.7 (L) 22 - 26 MMOL/L  
 sO2 (POC) 98 (H) 92 - 97 % Base deficit (POC) 2 mmol/L Allens test (POC) YES Total resp. rate 20 Site RIGHT RADIAL Patient temp. 37.0 Specimen type (POC) ARTERIAL Performed by Dede Flanagan TYPE & SCREEN Collection Time: 08/03/19 11:30 PM  
Result Value Ref Range Crossmatch Expiration 08/06/2019 ABO/Rh(D) O POSITIVE Antibody screen NEG   
GLUCOSE, POC Collection Time: 08/04/19  5:10 AM  
Result Value Ref Range Glucose (POC) 142 (H) 70 - 110 mg/dL PTT Collection Time: 08/04/19  7:14 AM  
Result Value Ref Range aPTT 74.2 (H) 23.0 - 36.4 SEC PROTHROMBIN TIME + INR Collection Time: 08/04/19  7:14 AM  
Result Value Ref Range Prothrombin time 30.0 (H) 11.5 - 15.2 sec INR 2.8 (H) 0.8 - 1.2 FIBRINOGEN Collection Time: 08/04/19  7:14 AM  
Result Value Ref Range Fibrinogen 86 (L) 210 - 451 mg/dL CBC WITH AUTOMATED DIFF Collection Time: 08/04/19  7:14 AM  
Result Value Ref Range WBC 4.5 (L) 4.6 - 13.2 K/uL  
 RBC 1.90 (L) 4.20 - 5.30 M/uL HGB 6.8 (L) 12.0 - 16.0 g/dL HCT 19.3 (L) 35.0 - 45.0 % .6 (H) 74.0 - 97.0 FL  
 MCH 35.8 (H) 24.0 - 34.0 PG  
 MCHC 35.2 31.0 - 37.0 g/dL  
 RDW 17.2 (H) 11.6 - 14.5 % PLATELET 582 (L) 151 - 420 K/uL MPV 11.0 9.2 - 11.8 FL  
 NEUTROPHILS 86 (H) 40 - 73 % LYMPHOCYTES 8 (L) 21 - 52 % MONOCYTES 6 3 - 10 % EOSINOPHILS 0 0 - 5 % BASOPHILS 0 0 - 2 %  
 ABS. NEUTROPHILS 3.9 1.8 - 8.0 K/UL  
 ABS. LYMPHOCYTES 0.4 (L) 0.9 - 3.6 K/UL  
 ABS. MONOCYTES 0.3 0.05 - 1.2 K/UL  
 ABS. EOSINOPHILS 0.0 0.0 - 0.4 K/UL  
 ABS. BASOPHILS 0.0 0.0 - 0.1 K/UL  
 DF AUTOMATED METABOLIC PANEL, COMPREHENSIVE Collection Time: 08/04/19  7:14 AM  
Result Value Ref Range Sodium 140 136 - 145 mmol/L Potassium 3.4 (L) 3.5 - 5.5 mmol/L Chloride 106 100 - 111 mmol/L  
 CO2 22 21 - 32 mmol/L Anion gap 12 3.0 - 18 mmol/L Glucose 163 (H) 74 - 99 mg/dL BUN 5 (L) 7.0 - 18 MG/DL Creatinine 0.79 0.6 - 1.3 MG/DL  
 BUN/Creatinine ratio 6 (L) 12 - 20 GFR est AA >60 >60 ml/min/1.73m2 GFR est non-AA >60 >60 ml/min/1.73m2 Calcium 8.6 8.5 - 10.1 MG/DL Bilirubin, total 16.5 (H) 0.2 - 1.0 MG/DL  
 ALT (SGPT) 21 13 - 56 U/L  
 AST (SGOT) 37 10 - 38 U/L Alk. phosphatase 90 45 - 117 U/L Protein, total 5.1 (L) 6.4 - 8.2 g/dL Albumin 3.1 (L) 3.4 - 5.0 g/dL Globulin 2.0 2.0 - 4.0 g/dL A-G Ratio 1.6 0.8 - 1.7 LACTIC ACID Collection Time: 08/04/19  7:14 AM  
Result Value Ref Range Lactic acid 5.6 (HH) 0.4 - 2.0 MMOL/L  
 
A/P: Worsening bilirubin due to drug induced liver injury. Not a  Transplant candidate as had malignancy recently. Generally grim prognosis. Consider NGT feeding and lactulose. Supportive care.  
 
Varun Langley MD

## 2019-08-04 NOTE — ROUTINE PROCESS
TRANSFER - IN REPORT: 
 
2348:  Terlephone Verbal report received from TAHIRA Green, (name) on Ancel Matter  being received from 4 N (unit) for change in patient condition(AMS, VS changes, lwoUO.) Report consisted of patients Situation, Background, Assessment and  
Recommendations(SBAR). Information from the following report(s) SBAR, Kardex, Procedure Summary, Intake/Output, MAR, Accordion, Recent Results, Cardiac Rhythm ST   and Alarm Parameters  was reviewed with the receiving nurse. Opportunity for questions and clarification was provided. Assessment completed upon patients arrival to unit and care assumed. Initially, slight moan, minimal withdrawal to pain. Dr. Bubba Pal has been to bedside, concerned with delay in lactulose, which has now been given. 2109:  Phoned Dr. Bubba Pal re: Low BP -> Complete l L NS bolus. X-ray for NGT placement. 0715:  Labs drawn. 0715:  Verbal Patient-side shift change report given to Stephan Hastnigs RN, (oncoming nurse) by Pacheco Deleon RN 
(offgoing nurse). Report included the following information SBAR, Kardex, ED Summary, Procedure Summary, Intake/Output, MAR, Recent Results and Med Rec Status. Included:  Intro, hx, two-RN eval of relevant assessment findings, LDAs, skin, diagnostics and infusions.

## 2019-08-05 NOTE — PROGRESS NOTES
NUTRITION Nutrition Screen RECOMMENDATIONS / PLAN:  
 
- Monitor mental status and pt ability to tolerate po and be resumed on diet as medically appropriate - Discontinue nutrition supplements now that pt NPO 
- Continue IV fluid hydration.  
- Continue RD inpatient monitoring and evaluation. NUTRITION INTERVENTIONS & DIAGNOSIS:  
 
[x] Meals/snacks: modified composition; NPO [x] IV fluids: continue 
[x] Medical food supplement therapy: Modify. Discontinue now that pt NPO [x] Collaboration and referral of nutrition care: pt and nutrition recommendations/plan discussed with Dr Mony Ahuja; pt to remain NPO and not medically appropriate for enteral nutrition supplement per MD.  
 
Nutrition Diagnosis:   Unintended weight loss related to predicted prolonged inadequate oral intake as evidenced by net wt loss of 56 lb, 29% x 3 years PTA. Inadequate energy intake related to insufficient calorie diet due to pt inability to tolerate solid food and recent change in mental status as evidenced by pt previously restricted to liquid diet, now NPO 
 
ASSESSMENT:  
 
8/5: pt having worsening mental status; had hypotension. Was transferred to ICU on 8/4. Made NPO. MD addressing plan of care goals with family. Noted Palliative consulted. Has NGT in place 8/2: Unable to obtain much information from pt today. Denied having any abdominal pain. Does not report to this write about consuming of meals or nutrition supplements. Unable to obtain information from nursing. No nausea per GI report. Episodes of elevated blood glucose levels 7/31: Pt with fair appetite and meal intake; tolerating full liquid diet. Agreeable to nutrition supplements; does not want Ensure Enlive, agreeable to [employer] supplement. Po intake encouraged 7/30: Pt consuming clear liquids; poor/fair meal intake but tolerating diet per RN. Pt denied having any abdominal pain or N/V.  Ensure Clear is ordered, but pt not consuming, dislikes the supplement. Declined having it changed to [address] Discussed report and recommendation for advancing po diet to full liquids with Dr Kg Joiner; MD agreed with plan. 
7/29: Pt started on clear liquids. Reported good appetite/ meal intake; tolerating diet. Agreeable to nutrition supplement. Noted difficulties with MRCP per GI note today 7/26: Pt asleep/ lethargic at time of visit. Currently NPO for planned MRCP today. Average po intake adequate to meet patients estimated nutritional needs:   [] Yes     [x] No   [] Unable to determine at this time Diet: DIET NUTRITIONAL SUPPLEMENTS Breakfast, Lunch, Dinner; [address] Food Allergies:  None known Current Appetite:   [] Good     [] Fair     [] Poor     [x] Other: NPO Appetite/meal intake prior to admission:   [] Good     [] Fair     [] Poor     [x] Other:  Unknown Feeding Limitations:  [] Swallowing difficulty    [] Chewing difficulty    [] Other: 
Current Meal Intake:  
Patient Vitals for the past 100 hrs: 
 % Diet Eaten 08/03/19 1647 0 % 08/03/19 1300 0 % 08/03/19 1121 0 % BM: 8/5 Skin Integrity:  No pressure injury or wound noted; jaundiced Edema:   [] No     [x] Yes Pertinent Medications: Reviewed: lasix, SSI, vitamin K1, protonix, D5 at 100 mL/hr (120 gm dextrose, 408 kcal per day), lactulose Recent Labs 08/05/19 
0500 08/04/19 
2200 08/04/19 
0714 08/03/19 
2224  137 140 137  
K 3.0* 3.3* 3.4* 3.3*  
 106 106 105 CO2 21 20* 22 23 * 165* 163* 142* BUN 5* 5* 5* 5*  
CREA 0.97 0.91 0.79 0.77 CA 8.2* 8.8 8.6 8.2* MG 1.6  --   --   --   
ALB 3.0*  --  3.1* 2.1*  
SGOT 38  --  37 44* ALT 19  --  21 24 Intake/Output Summary (Last 24 hours) at 8/5/2019 1112 Last data filed at 8/5/2019 0600 Gross per 24 hour Intake 1560 ml Output 2350 ml Net -790 ml Anthropometrics: 
Ht Readings from Last 1 Encounters:  
08/04/19 4' 6\" (1.372 m) Last 3 Recorded Weights in this Encounter 08/04/19 0030 08/04/19 0600 08/05/19 0247 Weight: 66.3 kg (146 lb 2.6 oz) 66.3 kg (146 lb 2.6 oz) 66.3 kg (146 lb 3.4 oz) Body mass index is 35.25 kg/m². Obese, Class I Weight History:   Noted weight loss, then weight gain during past few years; net wt loss of 56 lb, 29% x 3 years PTA per chart hx. Pt reported experiencing unplanned wt loss PTA per nursing screen Weight Metrics 8/5/2019 7/17/2019 5/6/2019 12/24/2018 12/3/2018 9/27/2016 Weight 146 lb 3.4 oz 130 lb 116 lb 12.8 oz 116 lb 115 lb 193 lb BMI 35.25 kg/m2 25.39 kg/m2 22.81 kg/m2 22.65 kg/m2 22.46 kg/m2 37.69 kg/m2 Admitting Diagnosis: Hypokalemia [E87.6] Jaundice [R17] Pertinent PMHx:  Pancreatic cancer, DM, HTN Education Needs:        [x] None identified  [] Identified - Not appropriate at this time  []  Identified and addressed - refer to education log Learning Limitations:   [] None identified  [x] Identified: poor/no responsiveness Cultural, Jewish & ethnic food preferences:  [x] None identified    [] Identified and addressed ESTIMATED NUTRITION NEEDS:  
 
Calories: 6460-2125 kcal (HBEx1.2-1.3) based on  [x] Actual BW: 62 kg      [] IBW Protein: 62-74 gm (1-1.2 gm/kg) based on  [x] Actual BW      [] IBW Fluid: 1 mL/kcal 
  
MONITORING & EVALUATION:  
 
Nutrition Goal(s):  Goals modified 1. Nutritional needs will be met through adequate oral intake or nutrition support within the next 7 days. Outcome:  [] Met/Ongoing    [] Progressing towards goal    [] Not progressing towards goal    [x] New/Initial goal  
2. Provide nutrition intervention as appropriate with goals of care for the next 5-7 days.  Outcome:  [] Met/Ongoing    [] Progressing towards goal    [] Not progressing towards goal    [x] New/Initial goal  
 
 
Monitoring:   [] Food and beverage intake   [x] Diet order   [x] Nutrition-focused physical findings   [x] Treatment/therapy   [] Weight [] Enteral nutrition intake Previous Recommendations (for follow-up assessments only):     []   Implemented       [x]   Not Implemented (RD to address)      [] No Longer Appropriate     [] No Recommendation Made Discharge Planning:  Pending ability to tolerate po and plan of care [x] Participated in care planning, discharge planning, & interdisciplinary rounds as appropriate Joey Caceres RD Pager: [phone]

## 2019-08-05 NOTE — ROUTINE PROCESS
Bedside and Verbal shift change report given to Kalee Bennett (oncoming nurse) by Werner Allen RN 
 (offgoing nurse). Report given with SBAR, Kardex, Intake/Output, MAR, Accordion and Recent Results.

## 2019-08-05 NOTE — PROGRESS NOTES
visited with the family of Swathi Cordoba, who is a 61 y.o.,female. The  provided the following Interventions: 
 
 responded to Staff's request for pastoral care and support to patient's family members - Della Lunsford, patient's daughter together with patient's brother, Noemí Coleman, and niece, Jaky Kevin who were in the waiting room, devastated by the news of patient's declining health. Offered actual prayer and assurance of continued prayers on patient's behalf. Plan: 
Chaplains will continue to follow and will provide pastoral care on an as needed/requested basis.  recommends bedside caregivers page  on duty if patient shows signs of acute spiritual or emotional distress. [address] Spiritual Care 
[phone]

## 2019-08-05 NOTE — PROGRESS NOTES
Met with pt in her room. No family member present. Pt could not have a discussion with me. Calledpt's sister Jo-Ann Jackson and left a message to return this call. CORINNE CabreraN RN Care Management Pager: [phone]

## 2019-08-05 NOTE — PROGRESS NOTES
0700 Bedside and Verbal shift change report given to Vikki Shepherd and Teo (oncoming nurse) by Mike Jhaveri (offgoing nurse). Report included the following information SBAR and Kardex. 1300 Pt had significant change in neurological status and began to speak in full sentences and answer questions regarding orientation. Pt is now oriented to self. 1500 Pt's family met with physician and palliative care. Family was tearful and requested . 1600 Pt moved to bed 302.

## 2019-08-05 NOTE — PROGRESS NOTES
[facility] 
[phone] Gastroenterology follow up-Progress note Impression: 1. Nbqfoyygrflsjwwabe-qredpfkck-ucq to drug induced liver injury. Not transplant candidate due to history of recent malignancy. Awaiting liver bx results. 2. Recent pancreatic ca s/p whipple procedure 4/19. On chemo and radiation treatment. Last 5Fu treatment over 2 weeks ago. Plan: 1. Await liver bx results. 2. Monitor LFTs. 3. NGT-await placement verification; would need dietary consult. 4. Supportive care. 5. No further GI recommendations at this time. Will sign off and be available as needed. Chief Complaint: Hyperbilirubinemia Subjective:  Alert: does not respond to voice; some response to pain. ROS: Denies any fevers, chills, rash. Eyes: conjunctiva normal, EOM normal  
Neck: ROM normal, supple and trachea normal  
Cardiovascular: heart normal, intact distal pulses, normal rate and regular rhythm Pulmonary/Chest Wall: breath sounds normal and effort normal  
Abdominal: appearance normal, bowel sounds normal and soft, non-acute, non-tender Patient Active Problem List  
Diagnosis Code  Hypokalemia E87.6  Jaundice R17  
 Essential hypertension I10  
 Obstructive hyperbilirubinemia K83.8  Pancreatic adenocarcinoma ([facility] [address] C25.9  Type 2 diabetes mellitus without complication, without long-term current use of insulin (HCC) E11.9  Hypoalbuminemia due to protein-calorie malnutrition ([facility] [address] E46 Visit Vitals /71 Pulse 92 Temp 97.6 °F (36.4 °C) Resp (!) 37 Ht 4' 6\" (1.372 m) Wt 66.3 kg (146 lb 3.4 oz) SpO2 100% Breastfeeding? No  
BMI 35.25 kg/m² Intake/Output Summary (Last 24 hours) at 8/5/2019 1346 Last data filed at 8/5/2019 0600 Gross per 24 hour Intake 2160 ml Output 2350 ml Net -190 ml CBC w/Diff Lab Results Component Value Date/Time  WBC 5.4 08/05/2019 05:00 AM  
 RBC 2.12 (L) 08/05/2019 05:00 AM  
 HGB 7.2 (L) 08/05/2019 05:00 AM  
 HCT 20.4 (L) 08/05/2019 05:00 AM  
 MCV 96.2 08/05/2019 05:00 AM  
 MCH 34.0 08/05/2019 05:00 AM  
 MCHC 35.3 08/05/2019 05:00 AM  
 RDW 20.4 (H) 08/05/2019 05:00 AM  
  (L) 08/05/2019 05:00 AM  
 Lab Results Component Value Date/Time GRANS 78 (H) 08/05/2019 05:00 AM  
 LYMPH 11 (L) 08/05/2019 05:00 AM  
 EOS 0 08/05/2019 05:00 AM  
 BASOS 0 08/05/2019 05:00 AM  
  
Basic Metabolic Profile Recent Labs 08/05/19 
0500   
K 3.0*  
 CO2 21 BUN 5*  
CA 8.2* MG 1.6 Hepatic Function Lab Results Component Value Date/Time ALB 3.0 (L) 08/05/2019 05:00 AM  
 TP 4.8 (L) 08/05/2019 05:00 AM  
 AP 70 08/05/2019 05:00 AM  
 Lab Results Component Value Date/Time SGOT 38 08/05/2019 05:00 AM  
  
 
 
Coags Recent Labs 08/05/19 
0500 08/04/19 
0107 PTP 32.1* 30.0* INR 3.1* 2.8* APTT >180.0* 74.2* Will sign off-Thank you for this consultation and the opportunity to participate in the care of this patient. Please do not hesitate to call with any questions or concerns, or should event occur that may necessitate additional GI evaluation. Renaldo Teague NP [facility]. [email or web address] Phone: [phone] Pager: [phone]

## 2019-08-05 NOTE — PROGRESS NOTES
Pt doesn't appear to be doing much better - noted events from yesterday Spoke with sister in law John Schafer & brother Jose Mercedes who will come at 1pm - explained that pt is not doing well Pt also has a daughter but per sister in law & brother - she is not mentally stable & is not capable of medical decision making The family will come in at 1-1.30pm to decide further level of decision making Have discussed with nurse , will follow

## 2019-08-05 NOTE — PROGRESS NOTES
[facility] 
 
 
CARDIOLOGY PROGRESS NOTE 
RECS: 
 
 
1. Sinus tachycardia- better. Treat underlying cause. Recent echo showed hyperdynamic systolic function with EF 65%-70%- will continue to monitor 2. Hypotensive- now with borderline BP. Was given Albumin boluses. Will monitor 3. Pancreatic adenocarcinoma s/p Whipple procedure 4/2019 with adjuvant radiation in [facility]- oncology following 4. Hyperbilirubinemia with jaundice- persistent 5. Severe Anemia-no active bleeding. S/p 1 unit PRBC's 8/3/19 6. Coagulopathy-  Persistent. Getting  vitamin K iv - not a candidate for liver transplant due to Hx of malignancy. S/p liver biopsy 8/1/19-No evidence of malignancy  seen.      
7. Edema-worsening Likely related to third spacing from multiple problems including hypoalbuminemia. Lasix as needed if BP tolerates it.   
8. Altered mental status -poorly responsive-  transferred to ICU overnight with increased ammonia levels - patient on lactulose 9. Hypokalemia - replete per protocol 10. Lactic acidosis- Managed by medical team 
 
Poor prognosis Continue supportive care. ASSESSMENT: 
Hospital Problems  Date Reviewed: 7/17/2019 Codes Class Noted POA Hypoalbuminemia due to protein-calorie malnutrition ([facility] [address] ICD-10-CM: E46 
ICD-9-CM: 263.9  7/26/2019 Yes Hypokalemia ICD-10-CM: E87.6 ICD-9-CM: 276.8  7/25/2019 Yes Jaundice ICD-10-CM: R17 
ICD-9-CM: 782.4  7/25/2019 Yes * (Principal) Obstructive hyperbilirubinemia (Chronic) ICD-10-CM: C61.7 ICD-9-CM: 576.8  7/4/2019 Yes Pancreatic adenocarcinoma (HCC) (Chronic) ICD-10-CM: C25.9 ICD-9-CM: 157.9  11/19/2018 Yes SUBJECTIVE: 
Does not respond to questions Lethargic OBJECTIVE: 
 
VS:  
Visit Vitals /71 Pulse 92 Temp 97.6 °F (36.4 °C) Resp (!) 37 Ht 4' 6\" (1.372 m) Wt 66.3 kg (146 lb 3.4 oz) SpO2 100% Breastfeeding? No  
BMI 35.25 kg/m² Intake/Output Summary (Last 24 hours) at 8/5/2019 1032 Last data filed at 8/5/2019 0600 Gross per 24 hour Intake 1660 ml Output 2350 ml Net -690 ml TELE: sinus rhythm General: in no apparent distress and does not respond to commands, very weak poorly responsive. HENT: Normocephalic, atraumatic. Icteric sclera and skin Neck :  no JVD Cardiac:  regular rate and rhythm, S1, S2 normal, no murmur, click, rub or gallop Lungs: clear to auscultation bilaterally Abdomen: Soft, nontender, no masses Extremities:  Edema +2  Up to upper thighs and lower back. peripheral pulses present Labs: Results:  
   
Chemistry Recent Labs 08/05/19 
0500 08/04/19 2200 08/04/19 
0714 08/03/19 
2224 * 165* 163* 142*  137 140 137  
K 3.0* 3.3* 3.4* 3.3*  
 106 106 105 CO2 21 20* 22 23 BUN 5* 5* 5* 5*  
CREA 0.97 0.91 0.79 0.77 CA 8.2* 8.8 8.6 8.2* AGAP 12 11 12 9 BUCR 5* 5* 6* 6* AP 70  --  90 99  
TP 4.8*  --  5.1* 4.6* ALB 3.0*  --  3.1* 2.1*  
GLOB 1.8*  --  2.0 2.5 AGRAT 1.7  --  1.6 0.8 CBC w/Diff Recent Labs 08/05/19 
0500 08/04/19 
2200 08/04/19 
1219 08/04/19 
9906 08/03/19 
0703 WBC 5.4 6.7  --  4.5* 6.1  
RBC 2.12* 2.28*  --  1.90* 2.15* HGB 7.2* 7.8* 6.9* 6.8* 7.7* HCT 20.4* 22.0* 19.7* 19.3* 22.0*  
* 118*  --  104* 124* GRANS 78*  --   --  86* 66  
LYMPH 11*  --   --  8* 18* EOS 0  --   --  0 1 Cardiac Enzymes No results for input(s): CPK, CKND1, SANDIE in the last 72 hours. No lab exists for component: Mone Rutherford Coagulation Recent Labs 08/05/19 
0500 08/04/19 
9477 PTP 32.1* 30.0* INR 3.1* 2.8* APTT >180.0* 74.2* Lipid Panel Lab Results Component Value Date/Time  Cholesterol, total 102 05/08/2019 09:08 AM  
 HDL Cholesterol 48 05/08/2019 09:08 AM  
 LDL, calculated 42.4 05/08/2019 09:08 AM  
 VLDL, calculated 11.6 05/08/2019 09:08 AM  
 Triglyceride 58 05/08/2019 09:08 AM  
 CHOL/HDL Ratio 2.1 05/08/2019 09:08 AM  
  
BNP No results for input(s): BNPP in the last 72 hours. Liver Enzymes Recent Labs 08/05/19 
0500 TP 4.8* ALB 3.0* AP 70 SGOT 38 Thyroid Studies Lab Results Component Value Date/Time TSH 1.85 08/01/2019 05:45 AM  
    
 
 
 
PEGGY Zambrano I have independently evaluated taken history and examined the patient. All relevant labs and testing data's are reviewed. Care plan discussed and updated after review.  
Terry Scott MD

## 2019-08-05 NOTE — PROGRESS NOTES
[facility] [phone] [facility]: [phone] Palliative Medicine team members Bebeto Mccollum, NP, and this writer, Lore Luz RN met in the ICU family waiting area for meeting. Present during the meeting was Dr. Beverly Michaels, patients daughter, Neli Horner, the sister in law, Stratford Greaser, niece and brother. Dr. Beverly Michaels had a eduin discussion with group concerning CRP and attempted to obtain from them what the Ms. Siobhan Bray wishes would be. Pt has no AMD on file. The prime decision maker is her daughter, Neli Horner. PM team explained pts current condition and the chance of recovery for this procedure. [facility] was too overwhelmed and crying to make any decision. Supportive listening provided. CODE STATUS:  FULL CODE/ FULL CARE Palliative Medicine will continue to work with family on ACP and Goals of care. Lore Luz RN, [facility] Palliative Medicine Inpatient RN  [facility] Palliative [facility] Line: [phone]

## 2019-08-05 NOTE — PROGRESS NOTES
Hematology / Oncology Progress Note [address] Patient ID: 
Rosanna Morataya 61 y.o. 
1955 Admit Date: 2019 Assessment:  
 
Principal Problem: 
  Obstructive hyperbilirubinemia (2019) Active Problems: Hypokalemia (2019) Jaundice (2019) Pancreatic adenocarcinoma ([facility] [address] (2018) Hypoalbuminemia due to protein-calorie malnutrition ([facility] [address] (2019) 
 
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  
 
Track cbc, lytes, ammonia, lfts Cont current support Agree with aJy 64 discussion. Would not recommend mechanical ventilation in the event of CP arrest 
 
Subjective:  
Arousable, denies pain. Objective:  
 
Visit Vitals /73 Pulse 88 Temp 97.6 °F (36.4 °C) Resp 15 Ht 4' 6\" (1.372 m) Wt 66.3 kg (146 lb 3.4 oz) SpO2 100% Breastfeeding? No  
BMI 35.25 kg/m² Temp (24hrs), Av.6 °F (36.4 °C), Min:97.5 °F (36.4 °C), Max:97.8 °F (36.6 °C) Intake/Output Summary (Last 24 hours) at 2019 1311 Last data filed at 2019 1300 Gross per 24 hour Intake 860 ml Output 2120 ml Net -1260 ml Review of Systems:  
Constitutional:  No Fever; No chills; No weight loss Skin:  No rash; No itching HEENT:  No changes in vision or hearing Cardiovascular:  No palpitations, chest pain, angina Respiratory:  No shortness of breath, no wheezing, no pleurisy, no cough, no  hemoptysis Gastrointestinal:  No nausea or vomiting; No diarrhea, no dyspepsis Genitourinary:  No dysuria; No increase in urinary frequency Musculoskeletal:  No weakness or muscle pains Endo:  No polyuria; no symptoms of thyroid dysfunction Heme:  No bruising; No bleeding, no adenopathy Neurological:  No Seizures; No focal weakness or sensory changes Psychiatric:  No mood changes; no suicidal ideation Physical Exam: 
General appearance - arousable, deeply icteric Eyes - pale, jaundice Ears - not examined Mouth - mucous membranes moist, pharynx normal without lesions Neck - supple, no significant adenopathy Lymphatics - no palpable lymphadenopathy, no hepatosplenomegaly Chest - clear to auscultation, no wheezes, rales or rhonchi, symmetric air entry Heart - normal rate, regular rhythm, normal S1, S2, no murmurs, rubs, clicks or gallops Abdomen - soft, nontender, nondistended, no masses or organomegaly Back exam - not examined Neurological - arousable but drifts off. Does not verbalize any new complaints Extremities - bipedal edema Skin - cool, edema Labs: 
Basic Metabolic Profile Recent Labs 08/05/19 
0500 08/04/19 2200 08/04/19 
6880  137 140 CO2 21 20* 22 BUN 5* 5* 5* CBC w/Diff Recent Labs 08/05/19 
0500 08/04/19 
2200 08/04/19 
1219 08/04/19 
7191 WBC 5.4 6.7  --  4.5* HCT 20.4* 22.0* 19.7* 19.3* No results for input(s): BANDS, LYMPHOCYTES in the last 72 hours. No lab exists for component: SEGS Hepatic Panel Hepatic Function No results found for: ALBUMIN   
 
Coagulation Recent Labs 08/05/19 
1106 08/05/19 
0500 08/04/19 
7371 08/03/19 
0703 INR  --  3.1* 2.8* 2.3* APTT 70.2* >180.0* 74.2* 67.1* Current medications reviewed Saji Watkins MD  
[facility] Office [phone]

## 2019-08-05 NOTE — CONSULTS
Palliative Medicine Consult [facility]: [phone] ([phone]) [facility]: [phone] [facility]: [phone] Patient Name: Manuel Zhao YOB: 1955 Date of Initial Consult: 8/5/19 Reason for Consult: Goals of care discussion Requesting Provider: Paty Lopez MD 
Primary Care Physician: Keo Delcid MD 
  
 SUMMARY:  
Manuel Zhao is a 61 y.o. female with a past history of pancreatic cancer, diabetes, HTN , who was admitted on 7/25/2019 from  with a diagnosis of [ancytopenia with liver failure. Current medical issues leading to Palliative Medicine involvement include: Discussion of goals of care. CHIEF COMPLAINT: Altered mental status HPI/SUBJECTIVE:   
Pt is a 61year old AA female that has been treated through her Hemotologist for Pancreatic adenocarcinoma. Patient with jaundice and deeply icteric. Question of cholestasis possibly 5FU induced. Patient with hepatic encephalopathy and liver failure. Deemed non operable and not a candidate for a transplant secondary to continued malignancy. Pt underwent a whipple procedure on 4/19. Current treatment includes NGT with supportive care. The patient is:  
[] Verbal and participatory [x] Non-participatory due to: Woke up for a short time. Unable to have complex conversations GOALS OF CARE: 
Patient/Health Care Proxy Stated Goals: Prolong life TREATMENT PREFERENCES:  
Code Status: Full Code PALLIATIVE DIAGNOSES:  
1. Goals of Care conversation/ACP 2. Pancreatic adenocarcinoma 3. Hyperbilirubinemia with jaundice 4. Severe protein calorie malnutrition PLAN:  
1. Goals of Care conversation/ACP Meeting with patients daughter Gayle Copeland, sister in law Mayra Parcel, patients brother, and niece along with Dr Kelsie Tellez hospitalist and Verena Ferguson RN from [address] [place] [address].  Informed family of patient condition and prognosis with burdens and benefits of ongoing treatment. Daughter extremely distressed saying she wants everything done to keep her mother alive. Had a hard time seeing her mother in this condition, but because her mother was at this time alert and speaking this NP led her into the room to have a conversation with her. Note that patients sister in law states that she will speak with [facility] and she thinks she will come around to understanding that resuscitation and intubation would not help her mother to any road to recovery. Plan to follow up tomorrow and continue to offer supportive listening and open communication and information. 2.   Pancreatic Adenocarcinoma Patient being followed through hematologist for treatment. Last note indicated that plan was to continue to offer supportive care and no recommendation for mechanical ventilation in the event of a CP arrest.  
3.   Hyperbilirubinemia with jaundice Reported as likely obstructive vs 5 FU related also being managed medically through hematology. GI note that patient is not a transplant candidate due to continued  malignancy 4. Malnutrition Patient with low albumin levels, protein levels and globulin levels. NGT in place for temporary nutrition. Will continue to educate and work with family to establish goals for pt 5. Initial consult note routed to primary continuity provider 6   Communicated plan of care with: Palliative IDT Advance Care Planning: 
[] [facility] Interdisciplinary Team has updated the ACP Navigator with [address] and Patient Capacity Primary Decision Maker ([address]): Patients legal next of Kin daughter Mara Wu Medical Interventions: Full interventions Other Instructions: will continue to educate and support patient and family.   
   
 
As far as possible, the palliative care team has discussed with patient / health care proxy about goals of care / treatment preferences for patient. HISTORY:  
 
History obtained from:  
Principal Problem: 
  Obstructive hyperbilirubinemia (7/4/2019) Active Problems: Hypokalemia (7/25/2019) Jaundice (7/25/2019) Pancreatic adenocarcinoma ([facility] (11/19/2018) Hypoalbuminemia due to protein-calorie malnutrition ([address] (7/26/2019) Past Medical History:  
Diagnosis Date  Asthma  Cancer ([address] 11/13/2018 Pancreatic cancer  Diabetes ([facility] [address]  Hypertension  Hypoalbuminemia due to protein-calorie malnutrition ([facility] [address] 7/26/2019  Ill-defined condition \"nerve problem\"  Pancreatic adenocarcinoma ([facility] [address] [facility]  Syphilis Past Surgical History:  
Procedure Laterality Date  BREAST SURGERY PROCEDURE UNLISTED    
 unsure of side-benign, lumpectomy  COLONOSCOPY N/A 9/27/2016 COLONOSCOPY with polypectomy. performed by Marlys Stringer MD at [facility] [place] [address] GYN    
 patient unsure of surgery History reviewed. No pertinent family history. History reviewed, no pertinent family history. Social History Tobacco Use  Smoking status: Never Smoker  Smokeless tobacco: Never Used Substance Use Topics  Alcohol use: No  
  Frequency: Never No Known Allergies Current Facility-Administered Medications Medication Dose Route Frequency  0.9% sodium chloride infusion 250 mL  250 mL IntraVENous PRN  
 0.9% sodium chloride infusion 250 mL  250 mL IntraVENous PRN  
 dextrose (D50W) injection syrg 12.5-25 g  12.5-25 g IntraVENous PRN  
 lactulose (CHRONULAC) 10 gram/15 mL solution 45 mL  30 g Oral QID  vancomycin (VANCOCIN) 1000 mg in  ml infusion  1,000 mg IntraVENous Q18H  phytonadione (vitamin K1) (AQUA-MEPHYTON) 10 mg in 0.9% sodium chloride 50 mL IVPB  10 mg IntraVENous DAILY  piperacillin-tazobactam (ZOSYN) 3.375 g in 0.9% sodium chloride (MBP/ADV) 100 mL MBP  3.375 g IntraVENous Q6H  
  acetaminophen (TYLENOL) tablet 500 mg  500 mg Oral PRN  
 diphenhydrAMINE (BENADRYL) capsule 25 mg  25 mg Oral PRN  
 sodium chloride (NS) flush 5-40 mL  5-40 mL IntraVENous Q8H  
 sodium chloride (NS) flush 5-40 mL  5-40 mL IntraVENous PRN  
 gelatin adsorbable (GELFOAM) 12-7 mm sponge 1 Each  1 Each Topical PRN  
 morphine injection 1 mg  1 mg IntraVENous Q8H PRN  
 dextrose 5% infusion  100 mL/hr IntraVENous CONTINUOUS  
 busPIRone (BUSPAR) tablet 10 mg  10 mg Oral TID  pantoprazole (PROTONIX) 40 mg in sodium chloride 0.9% 10 mL injection  40 mg IntraVENous Q12H  
 insulin lispro (HUMALOG) injection   SubCUTAneous AC&HS  
 glucose chewable tablet 16 g  4 Tab Oral PRN  
 glucagon (GLUCAGEN) injection 1 mg  1 mg IntraMUSCular PRN  
 furosemide (LASIX) injection 40 mg  40 mg IntraVENous PRN Clinical Pain Assessment (nonverbal scale for nonverbal patients): Activity (Movement): Lying quietly, normal position Duration: for how long has pt been experiencing pain (e.g., 2 days, 1 month, years) Frequency: how often pain is an issue (e.g., several times per day, once every few days, constant) FUNCTIONAL ASSESSMENT:  
 
Palliative Performance Scale (PPS): PPS: 20 
 
ECOG 
ECOG Status : Completely disabled PSYCHOSOCIAL/SPIRITUAL SCREENING:  
  
Any spiritual / Gnosticist concerns: 
[] Yes /  [x] No 
 
Caregiver Burnout: 
[] Yes /  [] No /  [x] No Caregiver Present Anticipatory grief assessment:  
[x] Normal  / [] Maladaptive REVIEW OF SYSTEMS:  
 
Positive and pertinent negative findings in ROS are noted above in HPI. The following systems were [x] reviewed / [] unable to be reviewed as noted in HPI Other findings are noted below. Constitutional: AA female appears stated age Eyes: pupils equal, severely icteric ENMT: no nasal discharge, moist mucous membranes Respiratory: breathing not labored, symmetric Gastrointestinal: soft non-tender Last bowel movement: 8/5/19 Musculoskeletal: no deformity, no tenderness to palpation Skin: warm, dry, jaundiced Neurologic:Alert and oriented to person  only following commands, moving all extremities Systems: constitutional, ears/nose/mouth/throat, respiratory, gastrointestinal, genitourinary, musculoskeletal, integumentary, neurologic, psychiatric, endocrine. Positive findings noted below. Modified ESAS Completed by: provider Fatigue: 5 Drowsiness: 5 Stool Occurrence(s): 1 PHYSICAL EXAM:  
 
Wt Readings from Last 3 Encounters:  
08/05/19 66.3 kg (146 lb 3.4 oz) 07/17/19 59 kg (130 lb) 05/06/19 53 kg (116 lb 12.8 oz) Blood pressure 102/74, pulse 81, temperature 97.7 °F (36.5 °C), resp. rate 15, height 4' 6\" (1.372 m), weight 66.3 kg (146 lb 3.4 oz), SpO2 100 %, not currently breastfeeding. Pain: 
Pain Scale 1: Numeric (0 - 10) Pain Intensity 1: 0 Pain Intervention(s) 1: Distraction, Repositioned LAB AND IMAGING FINDINGS:  
 
Lab Results Component Value Date/Time WBC 5.4 08/05/2019 05:00 AM  
 HGB 7.2 (L) 08/05/2019 05:00 AM  
 PLATELET 388 (L) 46/71/8067 05:00 AM  
 
Lab Results Component Value Date/Time Sodium 137 08/05/2019 05:00 AM  
 Potassium 3.0 (L) 08/05/2019 05:00 AM  
 Chloride 104 08/05/2019 05:00 AM  
 CO2 21 08/05/2019 05:00 AM  
 BUN 5 (L) 08/05/2019 05:00 AM  
 Creatinine 0.97 08/05/2019 05:00 AM  
 Calcium 8.2 (L) 08/05/2019 05:00 AM  
 Magnesium 1.7 08/05/2019 11:06 AM  
 Phosphorus 2.0 (L) 07/26/2019 09:35 AM  
  
Lab Results Component Value Date/Time AST (SGOT) 38 08/05/2019 05:00 AM  
 Alk. phosphatase 70 08/05/2019 05:00 AM  
 Protein, total 4.8 (L) 08/05/2019 05:00 AM  
 Albumin 3.0 (L) 08/05/2019 05:00 AM  
 Globulin 1.8 (L) 08/05/2019 05:00 AM  
 
Lab Results Component Value Date/Time  INR 3.1 (H) 08/05/2019 05:00 AM  
 Prothrombin time 32.1 (H) 08/05/2019 05:00 AM  
 aPTT 70.2 (H) 08/05/2019 11:06 AM  
  
No results found for: IRON, FE, TIBC, IBCT, PSAT, FERR No results found for: PH, PCO2, PO2 No components found for: [place] No results found for: CPK, CKMB Total time: 70 minutes Counseling / coordination time, spent as noted above:  
> 50% counseling / coordination:  Time was spent in direct consultation with family, patient and medical team 
 
Prolonged service was provided for  []30 min   []75 min in face to face time in the presence of the patient, spent as noted above. Time Start:  
Time End:  
Note: this can only be billed with 63827 (initial) or 69808 (follow up). If multiple start / stop times, list each separately.

## 2019-08-05 NOTE — PROGRESS NOTES
conducted a Follow up consultation and Spiritual Assessment for Marium Moss, who is a 61 y.o.,female. The  provided the following Interventions: 
Patient was alert and receptive to 's visit as relationship of care and support was continued. Patient was unable to express self fully but was able to answer \"yeah\" or a nod when she approves closed questions. Offered prayer and assurance of continued prayer on patient's behalf. During prayer patient squirmed as I prayed for the nurses. Chart reviewed. The following outcomes were achieved: 
Patient expressed satisfaction for 's visit by a nod as  said goodbye. Assessment: 
There are no further spiritual or Shinto issues which require Spiritual Care Services interventions at this time. Plan: 
Chaplains will continue to follow and will provide pastoral care on an as needed/requested basis.  recommends bedside caregivers page  on duty if patient shows signs of acute spiritual or emotional distress. [address] Spiritual Care  
[phone]

## 2019-08-06 NOTE — PROGRESS NOTES
Problem: Mobility Impaired (Adult and Pediatric) Goal: *Acute Goals and Plan of Care (Insert Text) Description Physical Therapy Goals Initiated 8/4/2019 and to be accomplished within 7 day(s) 1. Patient will participate in functional maintenance program for PROM/AAROM B UE and LE 3-5 times a week. Prior Level of Function: Unknown. Outcome: Progressing Towards Goal 
Patient: Hudson Taylor (64 y.o. female) Date: 8/6/2019 Subjective: 
Pt stated, \"I walk with a walker\" and \"I want to do that again\" in reference to walking with a walker. Patient cleared by nursing Matt Mcgraw) for participation in [facility]. Pt tolerated all bilateral PROM UE and LE exercises well. Pt was able to perform all exercises independently after being showed how to by tech. Offered some assistant to Pt when performing exercises for safety. Discussed need for re-evaluation with evaluating therapist. Pt was left resting comfortably in bed and session was discussed with nursing. Therapeutic Exercises:  
 
 
EXERCISE Sets Reps Active Active Assist  
Passive Self ROM Comments Ankle Pumps/Circles 1 10  ? ? ? ? BLE Heel Slides 1 7 ? ? ? ? BLE Hip ABD/ADD 1 10 ? ? ? ? BLE Finger Flex/Ext 1 10 ? ? ? ? BUE Wrist Flex/Ext 1 7 ? ? ? ? BUE Elbow Flex/Ext 1 10 ? BUE Shoulder Flex/Ext 1 8 ? ? ? ? BUE Pain: 
Pre Session: No pain noted Post session: No pain noted After treatment:  
? Patient left in no apparent distress in bed 
? Call bell left within reach ? Nursing notified ? Caregiver present ? Bed alarm activated Ayaz Soriano

## 2019-08-06 NOTE — PROGRESS NOTES
[facility] 
 
 
CARDIOLOGY PROGRESS NOTE 
RECS: 
 
 
1. Sinus tachycardia- better. Treat underlying cause. Recent echo showed hyperdynamic systolic function with EF 65%-70%- will continue to monitor 2. Hypotensive- now with borderline BP. May consider low dose midodrine if needed 3. Pancreatic adenocarcinoma s/p Whipple procedure 4/2019 with adjuvant radiation in [facility]- oncology following 4. Hyperbilirubinemia with jaundice- persistent 5. Severe Anemia-no active bleeding. S/p 1 unit PRBC's 8/3/19 6. Coagulopathy-  Persistent. Getting  vitamin K iv - not a candidate for liver transplant due to Hx of malignancy. S/p liver biopsy 8/1/19-No evidence of malignancy  seen. SEVERE STEATOSIS (GREATER THAN 80%).     
7. Edema-likely related to third spacing from multiple problems including hypoalbuminemia. Lasix as needed if BP tolerates it.   
8. Altered mental status -poorly responsive-  transferred to ICU  with increased ammonia levels - patient on lactulose 9. Hypokalemia - replete per protocol by [facility] 10. Lactic acidosis- Managed by medical team.  
 
Poor prognosis Continue supportive care. Agree with use of midodrine if needed ASSESSMENT: 
Hospital Problems  Date Reviewed: 7/17/2019 Codes Class Noted POA Goals of care, counseling/discussion ICD-10-CM: Z71.89 ICD-9-CM: V65.49  Unknown Unknown Hypoalbuminemia due to protein-calorie malnutrition ([facility] [address] ICD-10-CM: E46 
ICD-9-CM: 263.9  7/26/2019 Yes Hypokalemia ICD-10-CM: E87.6 ICD-9-CM: 276.8  7/25/2019 Yes Jaundice ICD-10-CM: R17 
ICD-9-CM: 782.4  7/25/2019 Yes * (Principal) Obstructive hyperbilirubinemia (Chronic) ICD-10-CM: N31.5 ICD-9-CM: 576.8  7/4/2019 Yes Pancreatic adenocarcinoma (HCC) (Chronic) ICD-10-CM: C25.9 ICD-9-CM: 157.9  11/19/2018 Yes SUBJECTIVE: 
 
More alert today. OBJECTIVE: 
 
VS:  
Visit Vitals /67 (BP 1 Location: Left arm, BP Patient Position: At rest) Pulse 87 Temp 97.7 °F (36.5 °C) Resp 16 Ht 4' 6\" (1.372 m) Wt 69.9 kg (154 lb 1.6 oz) SpO2 99% Breastfeeding? No  
BMI 37.16 kg/m² Intake/Output Summary (Last 24 hours) at 8/6/2019 1001 Last data filed at 8/6/2019 7291 Gross per 24 hour Intake 2780 ml Output 2345 ml Net 435 ml TELE: sinus rhythm General: in no apparent distress and does not respond to commands, very weak poorly responsive. HENT: Normocephalic, atraumatic. Icteric sclera and skin Neck :  no JVD Cardiac:  regular rate and rhythm, S1, S2 normal, no murmur, click, rub or gallop Lungs: clear to auscultation bilaterally Abdomen: Soft, nontender, no masses Extremities:  Edema +2  Up to upper thighs and lower back. peripheral pulses present Labs: Results:  
   
Chemistry Recent Labs 08/06/19 
1255 08/05/19 
0500 08/04/19 2200 08/04/19 
6944 * 189* 165* 163*  137 137 140  
K 2.8* 3.0* 3.3* 3.4*  
 104 106 106 CO2 21 21 20* 22 BUN 5* 5* 5* 5*  
CREA 0.98 0.97 0.91 0.79 CA 8.3* 8.2* 8.8 8.6 AGAP 11 12 11 12 BUCR 5* 5* 5* 6* AP 74 70  --  90  
TP 4.9* 4.8*  --  5.1* ALB 2.8* 3.0*  --  3.1*  
GLOB 2.1 1.8*  --  2.0 AGRAT 1.3 1.7  --  1.6 CBC w/Diff Recent Labs 08/06/19 
0420 08/05/19 
0500 08/04/19 2200 08/04/19 
5134 WBC 5.2 5.4 6.7  --  4.5*  
RBC 2.23* 2.12* 2.28*  --  1.90* HGB 7.5* 7.2* 7.8*   < > 6.8* HCT 21.3* 20.4* 22.0*   < > 19.3*  
 103* 118*  --  104* GRANS 66 78*  --   --  86* LYMPH 12* 11*  --   --  8*  
EOS 2 0  --   --  0  
 < > = values in this interval not displayed. Cardiac Enzymes No results for input(s): CPK, CKND1, SANDIE in the last 72 hours. No lab exists for component: Sissy Pat Coagulation Recent Labs 08/06/19 
0420 08/05/19 
1106 08/05/19 
0500 PTP 29.6*  --  32.1* INR 2.8*  --  3.1* APTT 69.3* 70.2* >180.0*  
   
 Lipid Panel Lab Results Component Value Date/Time Cholesterol, total 102 05/08/2019 09:08 AM  
 HDL Cholesterol 48 05/08/2019 09:08 AM  
 LDL, calculated 42.4 05/08/2019 09:08 AM  
 VLDL, calculated 11.6 05/08/2019 09:08 AM  
 Triglyceride 58 05/08/2019 09:08 AM  
 CHOL/HDL Ratio 2.1 05/08/2019 09:08 AM  
  
BNP No results for input(s): BNPP in the last 72 hours. Liver Enzymes Recent Labs 08/06/19 
1681 TP 4.9* ALB 2.8* AP 74 SGOT 46* Thyroid Studies Lab Results Component Value Date/Time TSH 1.85 08/01/2019 05:45 AM  
    
 
 
 
Becki Amado NP-C supervised I have independently evaluated and examined the patient. All relevant labs and testing data's are reviewed. Care plan discussed and updated after review.  
 
Marlys Calderón MD 
I

## 2019-08-06 NOTE — PROGRESS NOTES
Palliative Medicine Consult DR. [facility]: [phone] [facility]: [phone] [facility]: [phone] Patient Name: Yue Pack YOB: 1955 Date of Initial Consult: 8/5/19 Reason for Consult: Goals of care discussion Requesting Provider: Luisa Little MD 
Primary Care Physician: Ghazala Shafer MD 
  
 SUMMARY:  
Yue Pack is a 61 y.o. female with a past history of pancreatic cancer, diabetes, HTN , who was admitted on 7/25/2019 from  with a diagnosis of [ancytopenia with liver failure. Current medical issues leading to Palliative Medicine involvement include: Discussion of goals of care. CHIEF COMPLAINT: Altered mental status HPI/SUBJECTIVE:   
Pt is a 61year old AA female that has been treated through her Hemotologist for Pancreatic adenocarcinoma. Patient with jaundice and deeply icteric. Question of cholestasis possibly 5FU induced. Patient with hepatic encephalopathy and liver failure. Deemed non operable and not a candidate for a transplant secondary to continued malignancy. Pt underwent a whipple procedure on 4/19. Current treatment includes NGT with supportive care. Calculated meld is 28 indicating that patient would have a survival prognosis of 3 month. The patient is:  
[] Verbal and participatory [x] Non-participatory due to: Woke up for a short time. Unable to have complex conversations GOALS OF CARE: 
Patient/Health Care Proxy Stated Goals: Prolong life TREATMENT PREFERENCES:  
Code Status: Full Code At this time patient remains a FULL CODE with FULL INTERVENTIONS. PALLIATIVE DIAGNOSES:  
1. Goals of Care conversation/ACP 2. Pancreatic adenocarcinoma 3. Hyperbilirubinemia with jaundice 4. Severe protein calorie malnutrition PLAN:  
1. Goals of Care conversation/ACP 
8/6/19 Palliative team including this NP and Frank Schmidt met with this patient and found her to be competent enough to accept an AMD from her for the section on who she would trust to make her decisions for her in the event that she can not make them. When asked patient was able to on several occasions named her sister in law Fatmata Quiñonez and brother Aleena Miguel as her decision makers. Patient stated on at least three occasions that her daughter Kobe Angelaen not help me\". Palliative team called [facility] and informed her that patient had chosen her and she agreed to take on that responsibility. Have set up to meet with her at 4pm today to review goals of care status. At 5:30 At 5:50 daughter in  had not yet shown up for our meeting will attempt again for tomorrow. At this time patient remains a DNR/DNI with full interventions. See below for prior conversations Meeting with patients daughter Ambika Hernandez, sister in law Fatmata Quiñonez, patients brother, and niece along with Dr Bettie Colbert hospitalist and Leonides Partida RN from [address] [place] [address]. Informed family of patient condition and prognosis with burdens and benefits of ongoing treatment. Daughter extremely distressed saying she wants everything done to keep her mother alive. Had a hard time seeing her mother in this condition, but because her mother was at this time alert and speaking this NP led her into the room to have a conversation with her. Note that patients sister in  states that she will speak with Alfonso Baxter and she thinks she will come around to understanding that resuscitation and intubation would not help her mother to any road to recovery. Plan to follow up tomorrow and continue to offer supportive listening and open communication and information. 2.   Pancreatic Adenocarcinoma Patient being followed through hematologist for treatment.  Last note indicated that plan was to continue to offer supportive care and no recommendation for mechanical ventilation in the event of a CP arrest.  
 3.   Hyperbilirubinemia with jaundice Reported as likely obstructive vs 5 FU related also being managed medically through hematology. GI note that patient is not a transplant candidate due to continued  malignancy 4. Malnutrition Patient with low albumin levels, protein levels and globulin levels. NGT in place for temporary nutrition. Will continue to educate and work with family to establish goals for pt 5. Initial consult note routed to primary continuity provider 6   Communicated plan of care with: Palliative IDT Advance Care Planning: 
[x] The [facility] Interdisciplinary Team has updated the ACP Navigator with [address] and Patient Capacity Primary Decision Maker ([address]): Patient has named her sister in law Zhao Fraser as primary and secondary her brother Stephie Hartley Medical Interventions: Full interventions Other Instructions: will continue to educate and support patient and family. As far as possible, the palliative care team has discussed with patient / health care proxy about goals of care / treatment preferences for patient. HISTORY:  
 
History obtained from:  
Principal Problem: 
  Obstructive hyperbilirubinemia (7/4/2019) Active Problems: Hypokalemia (7/25/2019) Jaundice (7/25/2019) Pancreatic adenocarcinoma ([address] (11/19/2018) Hypoalbuminemia due to protein-calorie malnutrition ([address] (7/26/2019) Goals of care, counseling/discussion () Past Medical History:  
Diagnosis Date  Asthma  Cancer ([address] 11/13/2018 Pancreatic cancer  Diabetes ([address]  Hypertension  Hypoalbuminemia due to protein-calorie malnutrition ([address] 7/26/2019  Ill-defined condition \"nerve problem\"  Pancreatic adenocarcinoma ([address]  Syphilis Past Surgical History:  
Procedure Laterality Date  BREAST SURGERY PROCEDURE UNLISTED    
 unsure of side-benign, lumpectomy  COLONOSCOPY N/A 9/27/2016 COLONOSCOPY with polypectomy. performed by Julio C Cee MD at [facility] GYN    
 patient unsure of surgery History reviewed. No pertinent family history. History reviewed, no pertinent family history. Social History Tobacco Use  Smoking status: Never Smoker  Smokeless tobacco: Never Used Substance Use Topics  Alcohol use: No  
  Frequency: Never No Known Allergies Current Facility-Administered Medications Medication Dose Route Frequency  0.9% sodium chloride infusion 250 mL  250 mL IntraVENous PRN  
 0.9% sodium chloride infusion 250 mL  250 mL IntraVENous PRN  
 dextrose (D50W) injection syrg 12.5-25 g  12.5-25 g IntraVENous PRN  
 lactulose (CHRONULAC) 10 gram/15 mL solution 45 mL  30 g Oral QID  vancomycin (VANCOCIN) 1000 mg in  ml infusion  1,000 mg IntraVENous Q18H  piperacillin-tazobactam (ZOSYN) 3.375 g in 0.9% sodium chloride (MBP/ADV) 100 mL MBP  3.375 g IntraVENous Q6H  
 acetaminophen (TYLENOL) tablet 500 mg  500 mg Oral PRN  
 diphenhydrAMINE (BENADRYL) capsule 25 mg  25 mg Oral PRN  
 sodium chloride (NS) flush 5-40 mL  5-40 mL IntraVENous Q8H  
 sodium chloride (NS) flush 5-40 mL  5-40 mL IntraVENous PRN  
 gelatin adsorbable (GELFOAM) 12-7 mm sponge 1 Each  1 Each Topical PRN  
 morphine injection 1 mg  1 mg IntraVENous Q8H PRN  
 dextrose 5% infusion  100 mL/hr IntraVENous CONTINUOUS  
 busPIRone (BUSPAR) tablet 10 mg  10 mg Oral TID  pantoprazole (PROTONIX) 40 mg in sodium chloride 0.9% 10 mL injection  40 mg IntraVENous Q12H  
 insulin lispro (HUMALOG) injection   SubCUTAneous AC&HS  
 glucose chewable tablet 16 g  4 Tab Oral PRN  
 glucagon (GLUCAGEN) injection 1 mg  1 mg IntraMUSCular PRN  
 furosemide (LASIX) injection 40 mg  40 mg IntraVENous PRN Clinical Pain Assessment (nonverbal scale for nonverbal patients): Clinical Pain Assessment Severity: 0 Activity (Movement): Lying quietly, normal position Duration: for how long has pt been experiencing pain (e.g., 2 days, 1 month, years) Frequency: how often pain is an issue (e.g., several times per day, once every few days, constant) FUNCTIONAL ASSESSMENT:  
 
Palliative Performance Scale (PPS): PPS: 20 
 
ECOG 
ECOG Status : Completely disabled PSYCHOSOCIAL/SPIRITUAL SCREENING:  
  
Any spiritual / Latter day concerns: 
[] Yes /  [x] No 
 
Caregiver Burnout: 
[] Yes /  [] No /  [x] No Caregiver Present Anticipatory grief assessment:  
[x] Normal  / [] Maladaptive REVIEW OF SYSTEMS:  
 
Positive and pertinent negative findings in ROS are noted above in HPI. The following systems were [x] reviewed / [] unable to be reviewed as noted in HPI Other findings are noted below. Constitutional: AA female appears stated age mentation is impaired with current status unable to verbalize complex ideas or decisions. Eyes: pupils equal, severely icteric ENMT: no nasal discharge, moist mucous membranes Respiratory: breathing not labored, symmetric Gastrointestinal: soft non-tender Last bowel movement: 8/5/19 Musculoskeletal: no deformity, no tenderness to palpation Skin: warm, dry, jaundiced Neurologic:Alert and oriented to person and place only following commands, moving all extremities with assistance Systems: constitutional, ears/nose/mouth/throat, respiratory, gastrointestinal, genitourinary, musculoskeletal, integumentary, neurologic, psychiatric, endocrine. Positive findings noted below. Modified ESAS Completed by: provider Fatigue: 4 Drowsiness: 3 Pain: 0 Anxiety: 0 Dyspnea: 2 Stool Occurrence(s): 1 PHYSICAL EXAM:  
 
Wt Readings from Last 3 Encounters:  
08/06/19 69.9 kg (154 lb 1.6 oz)  
07/17/19 59 kg (130 lb) 05/06/19 53 kg (116 lb 12.8 oz) Blood pressure 100/58, pulse 75, temperature 97.9 °F (36.6 °C), resp.  rate 16, height 4' 6\" (1.372 m), weight 69.9 kg (154 lb 1.6 oz), SpO2 100 %, not currently breastfeeding. Pain: 
Pain Scale 1: Adult Nonverbal Pain Scale Pain Intensity 1: 0 Pain Intervention(s) 1: Distraction, Repositioned LAB AND IMAGING FINDINGS:  
 
Lab Results Component Value Date/Time WBC 5.2 08/06/2019 04:20 AM  
 HGB 7.5 (L) 08/06/2019 04:20 AM  
 PLATELET 776 86/98/8055 04:20 AM  
 
Lab Results Component Value Date/Time Sodium 141 08/06/2019 05:38 AM  
 Potassium 2.8 (LL) 08/06/2019 05:38 AM  
 Chloride 109 08/06/2019 05:38 AM  
 CO2 21 08/06/2019 05:38 AM  
 BUN 5 (L) 08/06/2019 05:38 AM  
 Creatinine 0.98 08/06/2019 05:38 AM  
 Calcium 8.3 (L) 08/06/2019 05:38 AM  
 Magnesium 1.7 08/05/2019 11:06 AM  
 Phosphorus 2.0 (L) 07/26/2019 09:35 AM  
  
Lab Results Component Value Date/Time AST (SGOT) 46 (H) 08/06/2019 05:38 AM  
 Alk. phosphatase 74 08/06/2019 05:38 AM  
 Protein, total 4.9 (L) 08/06/2019 05:38 AM  
 Albumin 2.8 (L) 08/06/2019 05:38 AM  
 Globulin 2.1 08/06/2019 05:38 AM  
 
Lab Results Component Value Date/Time INR 2.8 (H) 08/06/2019 04:20 AM  
 Prothrombin time 29.6 (H) 08/06/2019 04:20 AM  
 aPTT 69.3 (H) 08/06/2019 04:20 AM  
  
No results found for: IRON, FE, TIBC, IBCT, PSAT, FERR No results found for: PH, PCO2, PO2 No components found for: [place] No results found for: CPK, CKMB Total time: 70 minutes Counseling / coordination time, spent as noted above:  
> 50% counseling / coordination:  Time was spent in direct consultation with family, patient and medical team 
 
Prolonged service was provided for  []30 min   []75 min in face to face time in the presence of the patient, spent as noted above. Time Start:  
Time End:  
Note: this can only be billed with 74979 (initial) or 01922 (follow up). If multiple start / stop times, list each separately.

## 2019-08-06 NOTE — PROGRESS NOTES
[facility] Progress Note Patient: Hudson Taylor Age: 61 y.o. : 1955 MR#: [id number] SSN: [id number] Date/Time: 2019 Subjective:  
 
Pt admitted to hosp for elevated Bilirubin & obstructive jaundice in the setting of H/o Pancreatic carcinoma - s/p liver Bx but T Bili remains elevated Noted events from yesterday  - unfortunately pt is not doing well Her Liver fxn continues to deteriorate Assessment/Plan: 1. Obstructive jaundice with hyperbilirubinemia - Has had whipple's procedure done -  GI consulted , MRCP report reviewed - CT negative for obstruction 2. Hepatology note reviewed - Vit K 10mg x 3 for 3 days - Liver bx done by IR today - Results reviewed - shows Hepatic steatosis -No  improvement in INR despite Vit K  
3. H/o Pancreatic cancer - start creon when able to tolerate - Oncology on board  - CTA chest done 2y to Tachycardia - negative 4. Chronic anemia - monitor H&H   
5. Mild elevation in LFT's - monitor 6. Tachycardia - Cardiology input appreciated 7. Urinary retention - perez placed 8. Ammonia levels elevated - started on lactulose with some improvement - NGT placed 9. Palliative care consult DVT px - elevated INR  
FC Had a long discussion with family - sister in law brother , daughter with palliative care involved - would like to continue full code status for now Palliative care on board & plan to have meeting with sister in AM regarding [address] Case discussed with:  [x]Patient  []Family  []Nursing  []Case Management DVT Prophylaxis:  []Lovenox  []Hep SQ  []SCDs  []Coumadin   []On Heparin gtt Objective:  
VS:  
Visit Vitals BP 95/65 (BP 1 Location: Left arm, BP Patient Position: At rest) Pulse 78 Temp 98.3 °F (36.8 °C) Resp 11 Ht 4' 6\" (1.372 m) Wt 69.9 kg (154 lb 1.6 oz) SpO2 100% Breastfeeding? No  
BMI 37.16 kg/m² Tmax/24hrs: Temp (24hrs), Av.9 °F (36.6 °C), Min:97.7 °F (36.5 °C), Max:98.3 °F (36.8 °C) IOBRIEF Intake/Output Summary (Last 24 hours) at 2019 1844 Last data filed at 2019 1251 Gross per 24 hour Intake 2830 ml Output 1350 ml Net 1480 ml General:  Confused HEENT: PERRLA, icteric sclera & skin . Pulmonary:  CTA Bilaterally. No Wheezing/Rhonchi/Rales. Cardiovascular: Regular rate and Rhythm. GI:  Soft, Non distended, Non tender. + Bowel sounds. Extremities:  No edema, cyanosis, clubbing. No calf tenderness. Neurologic: confused likely from elevated ammonia Additional: 
 
Medications:  
Current Facility-Administered Medications Medication Dose Route Frequency  0.9% sodium chloride infusion 250 mL  250 mL IntraVENous PRN  
 0.9% sodium chloride infusion 250 mL  250 mL IntraVENous PRN  
 dextrose (D50W) injection syrg 12.5-25 g  12.5-25 g IntraVENous PRN  
 lactulose (CHRONULAC) 10 gram/15 mL solution 45 mL  30 g Oral QID  vancomycin (VANCOCIN) 1000 mg in  ml infusion  1,000 mg IntraVENous Q18H  piperacillin-tazobactam (ZOSYN) 3.375 g in 0.9% sodium chloride (MBP/ADV) 100 mL MBP  3.375 g IntraVENous Q6H  
 acetaminophen (TYLENOL) tablet 500 mg  500 mg Oral PRN  
 diphenhydrAMINE (BENADRYL) capsule 25 mg  25 mg Oral PRN  
 sodium chloride (NS) flush 5-40 mL  5-40 mL IntraVENous Q8H  
 sodium chloride (NS) flush 5-40 mL  5-40 mL IntraVENous PRN  
 gelatin adsorbable (GELFOAM) 12-7 mm sponge 1 Each  1 Each Topical PRN  
 morphine injection 1 mg  1 mg IntraVENous Q8H PRN  
 dextrose 5% infusion  100 mL/hr IntraVENous CONTINUOUS  
 busPIRone (BUSPAR) tablet 10 mg  10 mg Oral TID  pantoprazole (PROTONIX) 40 mg in sodium chloride 0.9% 10 mL injection  40 mg IntraVENous Q12H  
 insulin lispro (HUMALOG) injection   SubCUTAneous AC&HS  
 glucose chewable tablet 16 g  4 Tab Oral PRN  
 glucagon (GLUCAGEN) injection 1 mg  1 mg IntraMUSCular PRN  
  furosemide (LASIX) injection 40 mg  40 mg IntraVENous PRN Labs:   
Recent Results (from the past 24 hour(s)) LACTIC ACID Collection Time: 08/05/19 11:14 PM  
Result Value Ref Range Lactic acid 2.9 (HH) 0.4 - 2.0 MMOL/L  
AMMONIA Collection Time: 08/05/19 11:14 PM  
Result Value Ref Range Ammonia 47 (H) 11 - 32 UMOL/L  
GLUCOSE, POC Collection Time: 08/05/19 11:15 PM  
Result Value Ref Range Glucose (POC) 151 (H) 70 - 110 mg/dL PTT Collection Time: 08/06/19  4:20 AM  
Result Value Ref Range aPTT 69.3 (H) 23.0 - 36.4 SEC PROTHROMBIN TIME + INR Collection Time: 08/06/19  4:20 AM  
Result Value Ref Range Prothrombin time 29.6 (H) 11.5 - 15.2 sec INR 2.8 (H) 0.8 - 1.2 FIBRINOGEN Collection Time: 08/06/19  4:20 AM  
Result Value Ref Range Fibrinogen 78 (L) 210 - 451 mg/dL CBC WITH AUTOMATED DIFF Collection Time: 08/06/19  4:20 AM  
Result Value Ref Range WBC 5.2 4.6 - 13.2 K/uL  
 RBC 2.23 (L) 4.20 - 5.30 M/uL HGB 7.5 (L) 12.0 - 16.0 g/dL HCT 21.3 (L) 35.0 - 45.0 % MCV 95.5 74.0 - 97.0 FL  
 MCH 33.6 24.0 - 34.0 PG  
 MCHC 35.2 31.0 - 37.0 g/dL RDW 20.5 (H) 11.6 - 14.5 % PLATELET 902 422 - 707 K/uL MPV 11.2 9.2 - 11.8 FL  
 NEUTROPHILS 66 40 - 73 % LYMPHOCYTES 12 (L) 21 - 52 % MONOCYTES 20 (H) 3 - 10 % EOSINOPHILS 2 0 - 5 % BASOPHILS 0 0 - 2 %  
 ABS. NEUTROPHILS 3.5 1.8 - 8.0 K/UL  
 ABS. LYMPHOCYTES 0.6 (L) 0.9 - 3.6 K/UL  
 ABS. MONOCYTES 1.0 0.05 - 1.2 K/UL  
 ABS. EOSINOPHILS 0.1 0.0 - 0.4 K/UL  
 ABS. BASOPHILS 0.0 0.0 - 0.1 K/UL  
 DF SMEAR SCANNED    
 PLATELET COMMENTS ADEQUATE PLATELETS    
 RBC COMMENTS ANISOCYTOSIS 1+ 
    
 RBC COMMENTS HYPOCHROMIA 1+ 
    
 RBC COMMENTS POLYCHROMASIA 1+ 
    
 RBC COMMENTS TARGET CELLS 1+ LACTIC ACID Collection Time: 08/06/19  4:20 AM  
Result Value Ref Range Lactic acid 2.9 (HH) 0.4 - 2.0 MMOL/L  
AMMONIA  Collection Time: 08/06/19  4:20 AM  
 Result Value Ref Range Ammonia 53 (H) 11 - 32 UMOL/L  
HEPATIC FUNCTION PANEL Collection Time: 08/06/19  5:38 AM  
Result Value Ref Range Protein, total 4.9 (L) 6.4 - 8.2 g/dL Albumin 2.8 (L) 3.4 - 5.0 g/dL Globulin 2.1 2.0 - 4.0 g/dL A-G Ratio 1.3 0.8 - 1.7 Bilirubin, total 15.8 (H) 0.2 - 1.0 MG/DL Bilirubin, direct 10.7 (H) 0.0 - 0.2 MG/DL Alk. phosphatase 74 45 - 117 U/L  
 AST (SGOT) 46 (H) 10 - 38 U/L  
 ALT (SGPT) 24 13 - 56 U/L  
METABOLIC PANEL, BASIC Collection Time: 08/06/19  5:38 AM  
Result Value Ref Range Sodium 141 136 - 145 mmol/L Potassium 2.8 (LL) 3.5 - 5.5 mmol/L Chloride 109 100 - 111 mmol/L  
 CO2 21 21 - 32 mmol/L Anion gap 11 3.0 - 18 mmol/L Glucose 148 (H) 74 - 99 mg/dL BUN 5 (L) 7.0 - 18 MG/DL Creatinine 0.98 0.6 - 1.3 MG/DL  
 BUN/Creatinine ratio 5 (L) 12 - 20 GFR est AA >60 >60 ml/min/1.73m2 GFR est non-AA 57 (L) >60 ml/min/1.73m2 Calcium 8.3 (L) 8.5 - 10.1 MG/DL  
GLUCOSE, POC Collection Time: 08/06/19  8:15 AM  
Result Value Ref Range Glucose (POC) 175 (H) 70 - 110 mg/dL GLUCOSE, POC Collection Time: 08/06/19 10:55 AM  
Result Value Ref Range Glucose (POC) 181 (H) 70 - 110 mg/dL GLUCOSE, POC Collection Time: 08/06/19 10:59 AM  
Result Value Ref Range Glucose (POC) 206 (H) 70 - 110 mg/dL GLUCOSE, POC Collection Time: 08/06/19  4:43 PM  
Result Value Ref Range Glucose (POC) 153 (H) 70 - 110 mg/dL GLUCOSE, POC Collection Time: 08/06/19  5:21 PM  
Result Value Ref Range Glucose (POC) 267 (H) 70 - 110 mg/dL Signed By: Nathalie Noland MD   
 August 6, 2019

## 2019-08-06 NOTE — PALLIATIVE CARE
[address] [facility] 
[phone] Consult Note Patient Name: Selam Gaytan YOB: 1955 Age: 61 y.o. Date of Visit: 08/06/19 Facility of Care: [facility] Patient Room: 302/01 Attending: Fredy Mauro MD  
Diagnosis: Hypokalemia [E87.6] Jaundice [R17] Palliative team including Da Patiño RN and this NP met with pt at bedside and we are both in agreement that this patient is at this time capable of naming a primary and secondary decision maker for her. She repeated several time that her daughter Isa Tinajero should not make decisions stating that \"my daughter can not help me\". Patient is oriented X 2 to person and place. When attempting to have more complex conversations patient became increasingly confused. Plan to meet with Katty Phoenix (sister in law) SIMON today at the hospital later this afternoon to revisit patients code status. Thank you for the opportunity to assist in the care of this patient and their family. Please contact me at [phone] if you have any further questions. NUBIA [facility] [place], [place]

## 2019-08-06 NOTE — PROGRESS NOTES
Met with pt in her room and pt was able to tell me that her sister in-law Tonia Chang will be able to give me information. Spoke with Vero Dotson on the phone and we discussed discharge plan. Tonia Chang stated the family thinks it will be best for pt to go to a nursing facility long term so she can have 24 hours care. She stated pt had personal care aids at home but not for 24 hours. She will be at the hospital tomorrow and will let CM know their choices of nursing facilities. CORINNE LymanN RN Care Management Pager: [phone]

## 2019-08-06 NOTE — PROGRESS NOTES
[facility] Hospitalist Group Progress Note Patient: Greg Newberry Age: 61 y.o. : 1955 MR#: [id number] SSN: [id number] Date/Time: 2019 Subjective:  
 
Pt admitted to hosp for elevated Bilirubin & obstructive jaundice in the setting of H/o Pancreatic carcinoma - s/p liver Bx but T Bili remains elevated Noted events from yesterday  - unfortunately pt is not doing well Her Liver fxn continues to deteriorate Assessment/Plan: 1. Obstructive jaundice with hyperbilirubinemia - Has had whipple's procedure done -  GI consulted , MRCP report reviewed - CT negative for obstruction 2. Hepatology note reviewed - Vit K 10mg x 3 for 3 days - Liver bx done by IR today - Results reviewed - shows Hepatic steatosis -No  improvement in INR despite Vit K  
3. H/o Pancreatic cancer - start creon when able to tolerate - Oncology on board  - CTA chest done 2y to Tachycardia - negative 4. Chronic anemia - monitor H&H   
5. Mild elevation in LFT's - monitor 6. Tachycardia - Cardiology input appreciated 7. Urinary retention - perez placed 8. Ammonia levels elevated - started on lactulose with some improvement - NGT placed 9. Palliative care consult DVT px - elevated INR  
FC Had a long discussion with family - sister in law brother , daughter with palliative care involved - would like to continue full code status for now Will follow Case discussed with:  [x]Patient  []Family  []Nursing  []Case Management DVT Prophylaxis:  []Lovenox  []Hep SQ  []SCDs  []Coumadin   []On Heparin gtt Objective:  
VS:  
Visit Vitals /74 Pulse 81 Temp 97.7 °F (36.5 °C) Resp 15 Ht 4' 6\" (1.372 m) Wt 66.3 kg (146 lb 3.4 oz) SpO2 100% Breastfeeding? No  
BMI 35.25 kg/m² Tmax/24hrs: Temp (24hrs), Av.7 °F (36.5 °C), Min:97.6 °F (36.4 °C), Max:97.8 °F (36.6 °C) IOBRIEF Intake/Output Summary (Last 24 hours) at 2019 5416 Last data filed at 8/5/2019 1700 Gross per 24 hour Intake 3600 ml Output 1990 ml Net 1610 ml General:  Confused HEENT: PERRLA, icteric sclera & skin . Pulmonary:  CTA Bilaterally. No Wheezing/Rhonchi/Rales. Cardiovascular: Regular rate and Rhythm. GI:  Soft, Non distended, Non tender. + Bowel sounds. Extremities:  No edema, cyanosis, clubbing. No calf tenderness. Neurologic: confused likely from elevated ammonia Additional: 
 
Medications:  
Current Facility-Administered Medications Medication Dose Route Frequency  0.9% sodium chloride infusion 250 mL  250 mL IntraVENous PRN  
 0.9% sodium chloride infusion 250 mL  250 mL IntraVENous PRN  
 dextrose (D50W) injection syrg 12.5-25 g  12.5-25 g IntraVENous PRN  
 lactulose (CHRONULAC) 10 gram/15 mL solution 45 mL  30 g Oral QID  vancomycin (VANCOCIN) 1000 mg in  ml infusion  1,000 mg IntraVENous Q18H  phytonadione (vitamin K1) (AQUA-MEPHYTON) 10 mg in 0.9% sodium chloride 50 mL IVPB  10 mg IntraVENous DAILY  piperacillin-tazobactam (ZOSYN) 3.375 g in 0.9% sodium chloride (MBP/ADV) 100 mL MBP  3.375 g IntraVENous Q6H  
 acetaminophen (TYLENOL) tablet 500 mg  500 mg Oral PRN  
 diphenhydrAMINE (BENADRYL) capsule 25 mg  25 mg Oral PRN  
 sodium chloride (NS) flush 5-40 mL  5-40 mL IntraVENous Q8H  
 sodium chloride (NS) flush 5-40 mL  5-40 mL IntraVENous PRN  
 gelatin adsorbable (GELFOAM) 12-7 mm sponge 1 Each  1 Each Topical PRN  
 morphine injection 1 mg  1 mg IntraVENous Q8H PRN  
 dextrose 5% infusion  100 mL/hr IntraVENous CONTINUOUS  
 busPIRone (BUSPAR) tablet 10 mg  10 mg Oral TID  pantoprazole (PROTONIX) 40 mg in sodium chloride 0.9% 10 mL injection  40 mg IntraVENous Q12H  
 insulin lispro (HUMALOG) injection   SubCUTAneous AC&HS  
 glucose chewable tablet 16 g  4 Tab Oral PRN  
 glucagon (GLUCAGEN) injection 1 mg  1 mg IntraMUSCular PRN  
 furosemide (LASIX) injection 40 mg  40 mg IntraVENous PRN  
 Labs:   
Recent Results (from the past 24 hour(s)) LACTIC ACID Collection Time: 08/04/19 10:00 PM  
Result Value Ref Range Lactic acid 5.0 (HH) 0.4 - 2.0 MMOL/L  
AMMONIA Collection Time: 08/04/19 10:00 PM  
Result Value Ref Range Ammonia 24 11 - 32 UMOL/L  
CULTURE, BLOOD Collection Time: 08/04/19 10:00 PM  
Result Value Ref Range Special Requests: NO SPECIAL REQUESTS Culture result: NO GROWTH AFTER 9 HOURS    
CULTURE, BLOOD Collection Time: 08/04/19 10:00 PM  
Result Value Ref Range Special Requests: NO SPECIAL REQUESTS Culture result: NO GROWTH AFTER 9 HOURS METABOLIC PANEL, BASIC Collection Time: 08/04/19 10:00 PM  
Result Value Ref Range Sodium 137 136 - 145 mmol/L Potassium 3.3 (L) 3.5 - 5.5 mmol/L Chloride 106 100 - 111 mmol/L  
 CO2 20 (L) 21 - 32 mmol/L Anion gap 11 3.0 - 18 mmol/L Glucose 165 (H) 74 - 99 mg/dL BUN 5 (L) 7.0 - 18 MG/DL Creatinine 0.91 0.6 - 1.3 MG/DL  
 BUN/Creatinine ratio 5 (L) 12 - 20 GFR est AA >60 >60 ml/min/1.73m2 GFR est non-AA >60 >60 ml/min/1.73m2 Calcium 8.8 8.5 - 10.1 MG/DL  
CBC W/O DIFF Collection Time: 08/04/19 10:00 PM  
Result Value Ref Range WBC 6.7 4.6 - 13.2 K/uL  
 RBC 2.28 (L) 4.20 - 5.30 M/uL HGB 7.8 (L) 12.0 - 16.0 g/dL HCT 22.0 (L) 35.0 - 45.0 % MCV 96.5 74.0 - 97.0 FL  
 MCH 34.2 (H) 24.0 - 34.0 PG  
 MCHC 35.5 31.0 - 37.0 g/dL  
 RDW 19.3 (H) 11.6 - 14.5 % PLATELET 579 (L) 892 - 420 K/uL MPV 10.6 9.2 - 11.8 FL  
GLUCOSE, POC Collection Time: 08/05/19  1:36 AM  
Result Value Ref Range Glucose (POC) 171 (H) 70 - 110 mg/dL PTT Collection Time: 08/05/19  5:00 AM  
Result Value Ref Range aPTT >180.0 (HH) 23.0 - 36.4 SEC PROTHROMBIN TIME + INR Collection Time: 08/05/19  5:00 AM  
Result Value Ref Range Prothrombin time 32.1 (H) 11.5 - 15.2 sec INR 3.1 (H) 0.8 - 1.2 FIBRINOGEN Collection Time: 08/05/19  5:00 AM  
Result Value Ref Range Fibrinogen 73 (L) 210 - 451 mg/dL CBC WITH AUTOMATED DIFF Collection Time: 08/05/19  5:00 AM  
Result Value Ref Range WBC 5.4 4.6 - 13.2 K/uL  
 RBC 2.12 (L) 4.20 - 5.30 M/uL HGB 7.2 (L) 12.0 - 16.0 g/dL HCT 20.4 (L) 35.0 - 45.0 % MCV 96.2 74.0 - 97.0 FL  
 MCH 34.0 24.0 - 34.0 PG  
 MCHC 35.3 31.0 - 37.0 g/dL RDW 20.4 (H) 11.6 - 14.5 % PLATELET 384 (L) 442 - 420 K/uL MPV 10.2 9.2 - 11.8 FL  
 NEUTROPHILS 78 (H) 42 - 75 % LYMPHOCYTES 11 (L) 20 - 51 % MONOCYTES 11 (H) 2 - 9 % EOSINOPHILS 0 0 - 5 % BASOPHILS 0 0 - 3 %  
 ABS. NEUTROPHILS 4.2 1.8 - 8.0 K/UL  
 ABS. LYMPHOCYTES 0.6 (L) 0.8 - 3.5 K/UL  
 ABS. MONOCYTES 0.6 0 - 1.0 K/UL  
 ABS. EOSINOPHILS 0.0 0.0 - 0.4 K/UL  
 ABS. BASOPHILS 0.0 0.0 - 0.06 K/UL  
 DF AUTOMATED PLATELET COMMENTS Platelet Estimate, Decreased RBC COMMENTS ANISOCYTOSIS 1+ 
    
 RBC COMMENTS HYPOCHROMIA 1+ 
    
 RBC COMMENTS TARGET CELLS 1+ RBC COMMENTS OVALOCYTES 1+ METABOLIC PANEL, COMPREHENSIVE Collection Time: 08/05/19  5:00 AM  
Result Value Ref Range Sodium 137 136 - 145 mmol/L Potassium 3.0 (L) 3.5 - 5.5 mmol/L Chloride 104 100 - 111 mmol/L  
 CO2 21 21 - 32 mmol/L Anion gap 12 3.0 - 18 mmol/L Glucose 189 (H) 74 - 99 mg/dL BUN 5 (L) 7.0 - 18 MG/DL Creatinine 0.97 0.6 - 1.3 MG/DL  
 BUN/Creatinine ratio 5 (L) 12 - 20 GFR est AA >60 >60 ml/min/1.73m2 GFR est non-AA 58 (L) >60 ml/min/1.73m2 Calcium 8.2 (L) 8.5 - 10.1 MG/DL Bilirubin, total 16.2 (H) 0.2 - 1.0 MG/DL  
 ALT (SGPT) 19 13 - 56 U/L  
 AST (SGOT) 38 10 - 38 U/L Alk. phosphatase 70 45 - 117 U/L Protein, total 4.8 (L) 6.4 - 8.2 g/dL Albumin 3.0 (L) 3.4 - 5.0 g/dL Globulin 1.8 (L) 2.0 - 4.0 g/dL A-G Ratio 1.7 0.8 - 1.7 LACTIC ACID Collection Time: 08/05/19  5:00 AM  
Result Value Ref Range Lactic acid 4.0 (HH) 0.4 - 2.0 MMOL/L  
AMMONIA Collection Time: 08/05/19  5:00 AM  
Result Value Ref Range Ammonia 43 (H) 11 - 32 UMOL/L  
MAGNESIUM Collection Time: 08/05/19  5:00 AM  
Result Value Ref Range Magnesium 1.6 1.6 - 2.6 mg/dL GLUCOSE, POC Collection Time: 08/05/19  5:16 AM  
Result Value Ref Range Glucose (POC) 201 (H) 70 - 110 mg/dL LACTIC ACID Collection Time: 08/05/19 11:06 AM  
Result Value Ref Range Lactic acid 3.8 (HH) 0.4 - 2.0 MMOL/L  
MAGNESIUM Collection Time: 08/05/19 11:06 AM  
Result Value Ref Range Magnesium 1.7 1.6 - 2.6 mg/dL LD Collection Time: 08/05/19 11:06 AM  
Result Value Ref Range  81 - 234 U/L  
PTT Collection Time: 08/05/19 11:06 AM  
Result Value Ref Range aPTT 70.2 (H) 23.0 - 36.4 SEC GLUCOSE, POC Collection Time: 08/05/19 12:22 PM  
Result Value Ref Range Glucose (POC) 121 (H) 70 - 110 mg/dL LACTIC ACID Collection Time: 08/05/19  4:00 PM  
Result Value Ref Range Lactic acid 2.9 (HH) 0.4 - 2.0 MMOL/L  
GLUCOSE, POC Collection Time: 08/05/19  4:37 PM  
Result Value Ref Range Glucose (POC) 122 (H) 70 - 110 mg/dL Signed By: Ashanti Pulido MD   
 August 5, 2019

## 2019-08-06 NOTE — ROUTINE PROCESS
0730 Bedside and Verbal shift change report given to Heather (oncoming nurse) by LEX Galindo (offgoing nurse). Report included the following information SBAR, Kardex, MAR and Recent Results.

## 2019-08-06 NOTE — PROGRESS NOTES
Hematology / Oncology Progress Note [address] Patient ID: 
June Helm y.o. 
1955 Admit Date: 2019 Assessment:  
 
Principal Problem: 
  Obstructive hyperbilirubinemia (2019) Active Problems: Hypokalemia (2019) Jaundice (2019) Pancreatic adenocarcinoma ([facility] [address] (2018) Hypoalbuminemia due to protein-calorie malnutrition ([facility] [address] (2019) Goals of care, counseling/discussion () 
 
  
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 
  
Progressive hepatic failure LIVER, CORE NEEDLE BIOPSY -  2019 SEVERE STEATOSIS (GREATER THAN 80%). CHOLESTASIS WITH BILE DUCTULAR PROLIFERATION AND MIXED PORTAL INFLAMMATION CONSISTENT WITH EXTRAHEPATIC OBSTRUCTION. INCREASED PARENCHYMAL IRON (1-2+). PORTAL FIBROUS EXPANSION AND SINUSOIDAL FIBROSIS, NO BRIDGING  
FIBROSIS OR CIRRHOSIS IDENTIFIED Per discussions with Dr Fernandez Rashawn - coagulopathy is poorer prognosis especially if it does not correct with vit K More alert this am 
 
Plan:  
 
Track cbc, lytes, coags, lfts, ammonia Cont current support - ammonia, antibiotics, iv flusids, Note goals of care discussions yesterday Encourage full support but stop short of mechanical vent in event of CP arrest 
 
Subjective: More alert this am.  Verbalizes pain with rectal tube. Says she is not too tired. Denies pain elsewhere Objective:  
 
Visit Vitals BP (!) 83/54 Pulse 76 Temp 97.7 °F (36.5 °C) Resp 16 Ht 4' 6\" (1.372 m) Wt 69.9 kg (154 lb 1.6 oz) SpO2 100% Breastfeeding? No  
BMI 37.16 kg/m² Temp (24hrs), Av.7 °F (36.5 °C), Min:97.6 °F (36.4 °C), Max:97.8 °F (36.6 °C) Intake/Output Summary (Last 24 hours) at 2019 2330 Last data filed at 2019 0600 Gross per 24 hour Intake 2980 ml Output 2540 ml Net 440 ml Review of Systems:  
Constitutional:  No Fever; No chills; No weight loss Skin:  No rash; No itching HEENT:  No changes in vision or hearing Cardiovascular:  No palpitations, chest pain, angina Respiratory:  No shortness of breath, no wheezing, no pleurisy, no cough, no  hemoptysis Gastrointestinal:  No nausea or vomiting; No diarrhea, no dyspepsis Genitourinary:  No dysuria; No increase in urinary frequency Musculoskeletal:  No weakness or muscle pains Endo:  No polyuria; no symptoms of thyroid dysfunction Heme:  No bruising; No bleeding, no adenopathy Neurological:  No Seizures; No focal weakness or sensory changes Psychiatric:  No mood changes; no suicidal ideation Physical Exam: 
General appearance - acute on chronic illness, deeply icteric Eyes - pupils equal and reactive, extraocular eye movements intact, icteric Ears - not examined Mouth - mucous membranes moist, pharynx normal without lesions Neck - supple, no significant adenopathy Lymphatics - no palpable lymphadenopathy, no hepatosplenomegaly Chest - clear to auscultation, no wheezes, rales or rhonchi, symmetric air entry Heart - normal rate, regular rhythm, normal S1, S2, no murmurs, rubs, clicks or gallops Abdomen - soft, nontender, nondistended, no masses or organomegaly Back exam - not examined Neurological - alert but did not test for higher functions moves all 4s cooperates with procedures Extremities - perfusion ok grossly, edematous Skin - edema Labs: 
Basic Metabolic Profile Recent Labs 08/06/19 
0538 08/05/19 
0500 08/04/19 
2200  137 137 CO2 21 21 20* BUN 5* 5* 5* CBC w/Diff Recent Labs 08/06/19 
0420 08/05/19 
0500 08/04/19 
2200 WBC 5.2 5.4 6.7 HCT 21.3* 20.4* 22.0* No results for input(s): BANDS, LYMPHOCYTES in the last 72 hours. No lab exists for component: SEGS Hepatic Panel Hepatic Function No results found for: ALBUMIN   
 
Coagulation Recent Labs 08/06/19 
0420 08/05/19 
1106 08/05/19 
0500 08/04/19 
9682 INR 2.8*  --  3.1* 2.8* APTT 69.3* 70.2* >180.0* 74.2* Current medications reviewed Woodrow Newman MD MD 
[facility] Office [phone]

## 2019-08-06 NOTE — ACP (ADVANCE CARE PLANNING)
Palliative Medicine Consult [facility] [place]: [phone] ([phone]) [facility]: [phone] Pt does not have an Advanced medical Directive on File in EMR. Palliative Medicine team including Sara Souza, NP, and this Chelly Mckinney RN met with Ms. Gacria at bedside. Pt was awake and aware of person and place. She recalled visiting with her family members yesterday. Pt was able to state that her sister in law, her brother, daughter and aunt were present. Both PM team members are in agreement that this patient is at this time capable of naming a primary and secondary decision maker for herself. She repeated several time that her daughter Alberto To should not make decisions stating that \"my daughter cannot help me\". Patient is oriented X 2 to person and place but lacks the decision making ability to understand complex situations, so page 2 was not completed of the Advanced Medical Directive. This writer placed a call to Luther Burgess (sister in law) to determine if she accepts the role of MPOA. Ms. Sloane Tuttle stated that she accept the role of MPOA. Plan will be for PM team to meet later today at the hospital later this afternoon to revisit patients code status. Chela Vergara RN, [facility] Palliative Medicine Inpatient RN [facility] [place] Palliative [facility] Line: [phone] ([phone])

## 2019-08-07 NOTE — PROGRESS NOTES
SPEECH LANGUAGE PATHOLOGY BEDSIDE SWALLOW EVALUATION Patient: Giacomo Stratton [de-identified]61 y.o. female) Date: 8/7/2019 Primary Diagnosis: Hypokalemia [E87.6] Jaundice [R17] Precautions: Aspiration,  Fall PLOF: Regular diet with thin liquids ASSESSMENT : 
Based on the objective data described below, the patient presents with suspected mild oral dysphagia. Pt alert and oriented to person with NG in place. Pt demo mildly increased oral prep phase with regular solids. Pt able to clear scant oral residue with liquid wash. Pt tolerating thin liquids + straw with timely swallow initiation and adequate laryngeal elevation to palpation. No changes to vitals throughout evaluation. Recommend initiate soft solid diet with thin liquids with use of the above mentioned compensatory strategies/aspiration precautions. Results/recommendations discussed with pt, RN and MD. Will follow for further dysphagia management as indicated. Patient will benefit from skilled intervention to address the above impairments. Patient's rehabilitation potential is considered to be Poor - Hospice Factors which may influence rehabilitation potential include: ? None noted ? Mental ability/status ? Medical condition ? Home/family situation and support systems ? Safety awareness ? Pain tolerance/management ? Other: PLAN : 
Recommendations and Planned Interventions: As above Frequency/Duration: Patient will be followed by speech-language pathology 1-2 times per day/4-7 days per week to address goals. Discharge Recommendations: Plan to transition to hospice SUBJECTIVE:  
Patient stated This tastes good. OBJECTIVE:  
 
Past Medical History:  
Diagnosis Date Asthma Cancer ([facility] [address] 11/13/2018 Pancreatic cancer Diabetes ([facility] [address] Hypertension Hypoalbuminemia due to protein-calorie malnutrition ([facility] [address] 7/26/2019 Ill-defined condition \"nerve problem\" Pancreatic adenocarcinoma ([facility] [address] Syphilis Past Surgical History:  
Procedure Laterality Date BREAST SURGERY PROCEDURE UNLISTED    
 unsure of side-benign, lumpectomy COLONOSCOPY N/A 9/27/2016 COLONOSCOPY with polypectomy. performed by Tangela Gray MD at [facility] ENDOSCOPY HX GYN    
 patient unsure of surgery Prior Level of Function/Home Situation: 
Home Situation Home Environment: Apartment(pt states, \"I am currently living with my sister in law\") # Steps to Enter: 0 One/Two Story Residence: One story Living Alone: No 
Support Systems: Family member(s) Patient Expects to be Discharged to[de-identified] Unknown Current DME Used/Available at Home: None Diet prior to admission: Pt reports regular diet with thin liquids Current Diet:  NPO; recommend initiate regular diet with thin liquids Cognitive and Communication Status: 
Neurologic State: Confused Orientation Level: Oriented to person, Oriented to place Cognition: Follows commands Safety/Judgement: Fall prevention Oral Assessment: 
Oral Assessment Labial: No impairment Dentition: Natural 
Oral Hygiene: Good Lingual: No impairment Velum: No impairment Mandible: No impairment P.O. Trials: 
Patient Position: [facility] at [facility] Vocal quality prior to P.O.: Low volume Consistency Presented: Thin liquid; Solid;Puree How Presented: Self-fed/presented;Cup/sip;Spoon;Straw;Successive swallows Bolus Acceptance: No impairment Bolus Formation/Control: Impaired Type of Impairment: Mastication;Delayed Propulsion: Delayed (# of seconds) Oral Residue: Lingual;Less than 10% of bolus Initiation of Swallow: No impairment Laryngeal Elevation: Functional 
Aspiration Signs/Symptoms: None Pharyngeal Phase Characteristics: No impairment, issues, or problems Effective Modifications: Small sips and bites; Alternate liquids/solids Cues for Modifications: Minimal 
Oral Phase Severity: Mild Pharyngeal Phase Severity : No impairment PAIN: 
Start of Eval: 0 End of Eval: 0 After treatment:  
?            Patient left in no apparent distress sitting up in chair ? Patient left in no apparent distress in bed ? Call bell left within reach ? Nursing notified ? Family present ? Caregiver present ? Bed alarm activated COMMUNICATION/EDUCATION:  
?            Aspiration precautions; swallow safety; compensatory techniques. ?            Patient/family have participated as able in goal setting and plan of care. ?            Patient/family agree to work toward stated goals and plan of care. ?            Patient understands intent and goals of therapy; neutral about participation. ? Patient unable to participate in goal setting/plan of care; educ ongoing with interdisciplinary staff ? Posted safety precautions in patient's room. Thank you for this referral, Miguel A Bergman M.S., CCC-SLP Speech-Language Pathologist

## 2019-08-07 NOTE — PROGRESS NOTES
Problem: Mobility Impaired (Adult and Pediatric) Goal: *Acute Goals and Plan of Care (Insert Text) Description Physical Therapy Goals Initiated 8/7/2019 and to be accomplished within 7 day(s) 1. Patient will move from supine to sit and sit to supine, scoot up and down and roll side to side in bed with minimal assistance. 2.  Patient will transfer from bed to chair and chair to bed with minimal assistance using the least restrictive device. 3.  Patient will perform sit to stand with minimal assistance. 4.  Patient will ambulate with minimal assistance for 25 feet with the least restrictive device. 5.  Patient will demo good balance in order to improve safety during functional tasks. PLOF: Patient reports she lives with her brother and sister in law in a 1 story home with several steps to enter. Patient reports that she walks though was unclear if using AD. Outcome: Progressing Towards Goal 
 PHYSICAL THERAPY RE-EVALUATION Patient: Yue Pack [de-identified]61 y.o. female) Date: 8/7/2019 Primary Diagnosis: Hypokalemia [E87.6] Jaundice [R17] Precautions:   Fall ASSESSMENT : 
Patient presents today alert and agreeable to therapy and was supine in bed upon arrival. Patient demos increased command following, is oriented to self and place and able to provide more PLOF. Patient BP increased appropriately from supine to sit and patient endorsed minimal dizziness upon sitting EOB. Patient performed seated objective assessment and demo decreased strength bilaterally. Patient educated on bed to chair transfer and stood with therapist positioned in front of patient and patient using Left hand on recliner to push to stand. Patient required [address] [employer] [address] and was able to achieve full stand with assist to weight shift and take several steps with additional time and little clearance bilaterally during swing phases.  Patient assisted to sitting and appreciate assist of HETAL Rizo to scoot back into recliner and for chair alarm. Patient left resting with call bell by hier side and was able to teach back use of call bell for assist. Patient goals and POC updated and will continue to benefit from skilled therapy at this time. Patient will benefit from skilled intervention to address the above impairments. Patient's rehabilitation potential is considered to be Good Factors which may influence rehabilitation potential include:  
? None noted ? Mental ability/status ? Medical condition ? Home/family situation and support systems ? Safety awareness 
? Pain tolerance/management 
? Other: PLAN : 
Recommendations and Planned Interventions:  
?           Bed Mobility Training             ? Neuromuscular Re-Education ? Transfer Training                   ? Orthotic/Prosthetic Training 
? Gait Training                          ? Modalities ? Therapeutic Exercises           ? Edema Management/Control ? Therapeutic Activities            ? Family Training/Education ? Patient Education ? Other (comment): Frequency/Duration: Patient will be followed by physical therapy 1-2 times per day/4-7 days per week to address goals. Discharge Recommendations: Rehab Further Equipment Recommendations for Discharge: N/A  
 
SUBJECTIVE:  
Patient stated I needed more help when I felt sick.  OBJECTIVE DATA SUMMARY:  
Hospital course since last seen and reason for re-evaluation: Patient seen for re-eval today as patient demos increased command following for rehab tech during exercises. Patient demos increased command following, alertness, and ability to carryover learning. Goals updated for bed mobility and ambulation. Past Medical History:  
Diagnosis Date Asthma Cancer ([facility] 11/13/2018 [facility] Hypertension Hypoalbuminemia due to protein-calorie malnutrition ([facility] 7/26/2019 Ill-defined condition \"nerve problem\" Pancreatic adenocarcinoma ([facility] Syphilis Past Surgical History:  
Procedure Laterality Date BREAST SURGERY PROCEDURE UNLISTED    
 unsure of side-benign, lumpectomy COLONOSCOPY N/A 9/27/2016 COLONOSCOPY with polypectomy. performed by Dwayne Abad MD at [facility] ENDOSCOPY HX GYN    
 patient unsure of surgery Barriers to Learning/Limitations: None Compensate with: N/A Home Situation:  
Home Situation Home Environment: Apartment(pt states, \"I am currently living with my sister in law\") # Steps to Enter: 0 One/Two Story Residence: One story Living Alone: No 
Support Systems: Family member(s) Patient Expects to be Discharged to[de-identified] Unknown Current DME Used/Available at Home: None Critical Behavior: 
Neurologic State: Confused Orientation Level: Oriented to person;Oriented to place Cognition: Follows commands Strength:   
Strength: Generally decreased, functional(BLE) Tone & Sensation:  
Tone: Normal(BLE) Sensation: Intact(BLE) Range Of Motion: 
AROM: Within functional limits(BLE) Functional Mobility: 
Bed Mobility: 
 Supine to Sit: Maximum assistance Scooting: Maximum assistance Transfers: 
Sit to Stand: Moderate assistance Stand to Sit: Moderate assistance;Maximum assistance Bed to Chair: Moderate assistance; Additional time Balance:  
Sitting: Intact Standing: Impaired; With support Standing - Static: Good Standing - Dynamic : Fair Ambulation/Gait Training: 
Distance (ft): 3 Feet (ft) Ambulation - Level of Assistance: Moderate assistance Gait Abnormalities: Decreased step clearance; Step to gait;Trunk sway increased Base of Support: Narrowed Speed/Celeste: Slow Step Length: Left shortened;Right shortened Interventions: Tactile cues; Verbal cues; Visual/Demos Pain: 
Pain level pre-treatment: 4/10 Pain level post-treatment: 4/10 Pain Intervention(s) : Medication (see MAR); Rest, Repositioning Response to intervention: Nurse notified, See doc flow Activity Tolerance:  
Patient tolerated activity fair and was left resting with call bell by her side. Please refer to the flowsheet for vital signs taken during this treatment. After treatment:  
?         Patient left in no apparent distress sitting up in chair ? Patient left in no apparent distress in bed 
? Call bell left within reach ? Nursing notified ? Caregiver present ? Bed alarm activated ? SCDs applied COMMUNICATION/EDUCATION:  
?         Role of Physical Therapy in the acute care setting. ?         Fall prevention education was provided and the patient/caregiver indicated understanding. ? Patient/family have participated as able in goal setting and plan of care. ?         Patient/family agree to work toward stated goals and plan of care. ?         Patient understands intent and goals of therapy, but is neutral about his/her participation. ? Patient is unable to participate in goal setting/plan of care: ongoing with therapy staff. ?         Other: Thank you for this referral. 
Kristin Duncan, PT Time Calculation: 39 mins

## 2019-08-07 NOTE — PROGRESS NOTES
Pt did well with swallow eval - will remove NGT Stopped Abx after discussion with Pharmacy Will continue to follow

## 2019-08-07 NOTE — PALLIATIVE CARE
[address] Good Help to Those in Need 
[phone] Consult Note Patient Name: Sandra Ellison YOB: 1955 Age: 61 y.o. Date of Visit: 08/07/19 Facility of Care:  [facility] Patient Room: 302/01 Attending: Rohan Kim MD  
Diagnosis: Hypokalemia [E87.6] Jaundice [R17] Met with patient's MPOA who is making patient a DNR/DNI with plans to move to hospice upon discharge. CM working on placement. See full note for details. ICU personnel and Attending made aware. Thank you for the opportunity to assist in the care of this patient and their family. Please contact me at [phone] if you have any further questions. Soniya DELACRUZ Palliative Medicine [place], [place]

## 2019-08-07 NOTE — ACP (ADVANCE CARE PLANNING)
Palliative Medicine CODE STATUS- DNR/DNI for respiratory arrest or distress with NO Escalation of Care including high flow oxygen or IV pressors. Shantal Whitman Pt does have an Advance Directive on file in EMR naming her sister in law, Zeyad Aleman (primary) and her Brother, Lisette Crews (successor). Palliative Medicine team Mary Hernandez NP, and This writer, Anel Sahni RN meet with family in the ICU meeting room. Provided copies of AMD completed by patient on 8/6/19 to them. Explored with Marley Alex and James the benefits and burdens of CPR. Marley Alex stated that Hilary Leo would not want CPR that, this was confirmed with James also for no CPR. DNR/DNI order placed. PM team asked if they would like information concerning hospice and they agreed. Hospice referral placed. She stated that she would want to make to right decisions for her. We provided supportive listening. At this time patient is a DNR/DNI for respiratory distress or arrest with NO Escalation of Care including high flow oxygen or IV pressors. Plan is for Hospice upon discharge to the SNF. ACP documents you currently have include: 
[x] Advance Directive or Living Will 
[] Durable Do Not Resuscitate [x] Physician Orders for Scope of Treatment (POST) [] Medical Power of  
[] Other - None Will continue to follow up with pt and provide support. Thank you for the Palliative Medicine consult and allowing us to participate in the care of Mrs. Garcia. Will continue to monitor and provide support. Anel Sahni RN,  BSN Palliative Medicine Inpatient RN [facility] Palliative [facility] Line: [phone] ([phone])

## 2019-08-07 NOTE — HOSPICE
Pt/family pursuing hospice:yes Admission dx: Pancreatic cancer Anticipated hospital d/c date:undetermined Discussion occurred with patient and/or family about preference of hospitalization once admitted to hospice: yes Reviewed and discussed hospice philosophy and keeping the patient comfortable in their residence: yes Narrative of events and/or assessment if applicable: Spoke with Yanira Hernandez over the phone. Discussed [facility] philosophy, services, criteria, and IDT. Answered all questions. Per Ade Caldera, waiting on a facility to except patient and would like move forward with hospice services on discharge. Paged CM to update. Left brochure with 24/7 contact information at bedside. Thank you for the referral to [facility]. If we can be of further assistance please contact [phone]. MARTHA Rodriguez, RN 
Nurse Liaison, [address] [address], [address] [place] [address]. 
[phone] 
[email or web address] Thank you for the referral to [facility]. If we can be of further assistance please contact [phone]. MARTHA Fuller, RN 
Nurse Liaison, [address] [address], [address]. 
[phone] 
[email or web address]

## 2019-08-07 NOTE — PROGRESS NOTES
Critical lab results for K and lactic acid received and documented, MD paged. Order received for K replacement and AM lactic acid lab. Bedside shift change report given to Shimon Brown RN (oncoming nurse) by Rebecca Ochoa RN (offgoing nurse).  Report included the following information SBAR, Intake/Output, MAR, Recent Results and Cardiac Rhythm SR.

## 2019-08-07 NOTE — HOSPICE
Bedside visit. Patient sitting in bedside chair. Patient requested writer to contact Olya Roque to discuss hospice services. Thank you for the referral to [address] [place] [address]. Any questions or concerns please call [phone].

## 2019-08-07 NOTE — PROGRESS NOTES
Hematology / Oncology Progress Note [address] Patient ID: 
Giacomo Stratton 61 y.o. 
1955 Admit Date: 2019 Assessment:  
 
Principal Problem: 
  Obstructive hyperbilirubinemia (2019) Active Problems: Hypokalemia (2019) Jaundice (2019) Pancreatic adenocarcinoma ([facility] (2018) Hypoalbuminemia due to protein-calorie malnutrition ([facility] (2019) Goals of care, counseling/discussion () 
 
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  
NO recurrence documented on Ct/MRCP/liver bx 
  
Progressive hepatic failure LIVER, CORE NEEDLE BIOPSY -  2019 SEVERE STEATOSIS (GREATER THAN 80%). CHOLESTASIS WITH BILE DUCTULAR PROLIFERATION AND MIXED PORTAL INFLAMMATION CONSISTENT WITH EXTRAHEPATIC OBSTRUCTION. INCREASED PARENCHYMAL IRON (1-2+). PORTAL FIBROUS EXPANSION AND SINUSOIDAL FIBROSIS, NO BRIDGING  
FIBROSIS OR CIRRHOSIS IDENTIFIED Not clinically worse compared to yesterday Plan:  
 
Track cbc, lytes, coags, lfts Cont current support Would recommend against mech vent Subjective:  
Denies pain. No bleeding reported. No fevers, no chills, no cough Objective:  
 
Visit Vitals BP (!) 86/49 (BP 1 Location: Left arm, BP Patient Position: At rest;Head of bed elevated (Comment degrees)) Comment (BP Patient Position): 30 Pulse 94 Temp 98.5 °F (36.9 °C) Resp 12 Ht 4' 6\" (1.372 m) Wt 69.9 kg (154 lb 1.6 oz) SpO2 99% Breastfeeding? No  
BMI 37.16 kg/m² Temp (24hrs), Av.2 °F (36.8 °C), Min:97.9 °F (36.6 °C), Max:98.5 °F (36.9 °C) Intake/Output Summary (Last 24 hours) at 2019 0702 Last data filed at 2019 1556 Gross per 24 hour Intake 1700 ml Output 3150 ml Net -1450 ml Review of Systems: Constitutional:  No Fever; No chills; No weight loss Skin:  No rash; No itching HEENT:  No changes in vision or hearing Cardiovascular:  No palpitations, chest pain, angina Respiratory:  No shortness of breath, no wheezing, no pleurisy, no cough, no  hemoptysis Gastrointestinal:  No nausea or vomiting; No diarrhea, no dyspepsis Genitourinary:  No dysuria; No increase in urinary frequency Musculoskeletal:  No weakness or muscle pains Endo:  No polyuria; no symptoms of thyroid dysfunction Heme:  No bruising; No bleeding, no adenopathy Neurological:  No Seizures; No focal weakness or sensory changes Psychiatric:  No mood changes; no suicidal ideation Physical Exam: 
General appearance - awake, tries to converse, coherent, nad Eyes - pupils equal and reactive, extraocular eye movements intact, deeply icteric Ears - not examined Mouth - mucous membranes moist, pharynx normal without lesions Neck - supple, no significant adenopathy Lymphatics - no palpable lymphadenopathy, no hepatosplenomegaly Chest - clear to auscultation, no wheezes, rales or rhonchi, symmetric air entry Heart - normal rate, regular rhythm, normal S1, S2, no murmurs, rubs, clicks or gallops Abdomen - soft, nontender, nondistended, no masses or organomegaly Back exam - not examined Neurological - alert, oriented, normal speech, no focal findings or movement disorder noted Extremities - bipedal edema Skin - edema Labs: 
Basic Metabolic Profile Recent Labs 08/07/19 
0320 08/06/19 
0538 08/05/19 
0500 * 141 137 CO2 23 21 21 BUN 4* 5* 5* CBC w/Diff Recent Labs 08/07/19 
0320 08/06/19 
0420 08/05/19 
0500 WBC 3.7* 5.2 5.4  
HCT 20.3* 21.3* 20.4* No results for input(s): BANDS, LYMPHOCYTES in the last 72 hours. No lab exists for component: SEGS Hepatic Panel Hepatic Function No results found for: ALBUMIN   
 
Coagulation Recent Labs 08/07/19 
0320 08/06/19 5560 08/05/19 
1106 08/05/19 
0500 INR 2.8* 2.8*  --  3.1* APTT 63.6* 69.3* 70.2* >180.0* Current medications reviewed Marlys De La Paz MD  
[facility] Office [phone]

## 2019-08-07 NOTE — PROGRESS NOTES
[facility]all[facility] Medicine Consult [facility] [phone] ([phone]) [facility]: [phone] [facility]: [phone] Patient Name: Marium Moss YOB: 1955 Date of Initial Consult: 8/5/19 Reason for Consult: Goals of care discussion Requesting Provider: Gerhardt Sheen MD 
Primary Care Physician: Nkechi Almanza MD 
  
 SUMMARY:  
Marium Moss is a 61 y.o. female with a past history of pancreatic cancer, diabetes, HTN , who was admitted on 7/25/2019 from  with a diagnosis of [ancytopenia with liver failure. Current medical issues leading to Palliative Medicine involvement include: Discussion of goals of care. CHIEF COMPLAINT: Altered mental status HPI/SUBJECTIVE:   
Pt is a 61year old AA female that has been treated through her Hemotologist for Pancreatic adenocarcinoma. Patient with jaundice and deeply icteric. Question of cholestasis possibly 5FU induced. Patient with hepatic encephalopathy and liver failure. Deemed non operable and not a candidate for a transplant secondary to continued malignancy. Pt underwent a whipple procedure on 4/19. Current treatment includes NGT with supportive care. Calculated meld is 28 indicating that patient would have a survival prognosis of 3 month. The patient is:  
[] Verbal and participatory [x] Non-participatory due to: Woke up for a short time. Unable to have complex conversations GOALS OF CARE: 
Patient/Health Care Proxy Stated Goals: Prolong life TREATMENT PREFERENCES:  
Code Status: DNR At this time patient is a DNR/DNI with NO Escalation of Care including high flow oxygen or IV pressors. Plan is for Hospice upon discharge to the facility. PALLIATIVE DIAGNOSES:  
1. Goals of Care conversation/ACP 2. Pancreatic adenocarcinoma 3. Hyperbilirubinemia with jaundice 4. Severe protein calorie malnutrition PLAN:  
1. Goals of Care conversation/ACP 
8/7/19 Palliative team including Anel Sahni RN met with patients SIMON sister in law Zeyad Aleman and patients brother Elise Milligan who is patients secondary decision maker. Discussion regarding benefits and burdens of escalation of treatment and resuscitation with consideration of patients prognosis. SIMON states that she knows what Hilary Leo would want and wants her to be moved to a LTC facility with hospice support. Spoke with patients attending who is in agreement with this plan. Spoke with CM and medical team in the step down unit and they understand that patient is now a DNR/DNI. Orders placed for DNR/DNI and No Escalation of Care including no IV Vaso Pressors and No High Flow oxygen. Hospice order placed for evaluation and treatment if eligible. At this time medical team and family believe that patient is not able to make these higher level decisions due to some confusion and difficulty with concentration. 8/6/19 Palliative team including this NP and Anel Sahni met with this patient and found her to be competent enough to accept an AMD from her for the section on who she would trust to make her decisions for her in the event that she can not make them. When asked patient was able to on several occasions named her sister in law Zeyad Aleman and brother Elise Milligan as her decision makers. Patient stated on at least three occasions that her daughter Rochelle Diaz not help me\". Palliative team called [facility] and informed her that patient had chosen her and she agreed to take on that responsibility. Have set up to meet with her at 4pm today to review goals of care status. At 5:50 sister in  had not yet shown up for our meeting will attempt again for tomorrow. At this time patient remains a DNR/DNI with full interventions. See below for prior conversations Meeting with patients daughter [address], sister in law Shahid Asencio Bandar Desai, patients brother, and niece along with Dr Odette Grigsby hospitalist and Charan Mccrary RN from Palliative Medicine. Informed family of patient condition and prognosis with burdens and benefits of ongoing treatment. Daughter extremely distressed saying she wants everything done to keep her mother alive. Had a hard time seeing her mother in this condition, but because her mother was at this time alert and speaking this NP led her into the room to have a conversation with her. Note that patients sister in law states that she will speak with [facility] and she thinks she will come around to understanding that resuscitation and intubation would not help her mother to any road to recovery. Plan to follow up tomorrow and continue to offer supportive listening and open communication and information. 2.   Pancreatic Adenocarcinoma Patient being followed through hematologist for treatment. Last note indicated that plan was to continue to offer supportive care and no recommendation for mechanical ventilation in the event of a CP arrest.  
3.   Hyperbilirubinemia with jaundice Reported as likely obstructive vs 5 FU related also being managed medically through hematology. GI note that patient is not a transplant candidate due to continued  malignancy 4. Malnutrition Patient with low albumin levels, protein levels and globulin levels. NGT in place for temporary nutrition. Will continue to educate and work with family to establish goals for pt 5. Initial consult note routed to primary continuity provider 6   Communicated plan of care with: Palliative IDT Advance Care Planning: 
[x] The [facility] Interdisciplinary Team has updated the ACP Navigator with [address] and Patient Capacity Primary Decision Maker ([address]): Patient has named her sister in law Lovette Bloch as primary and secondary her brother Jeremy Innocent Medical Interventions: Full interventions Other Instructions: will continue to educate and support patient and family. As far as possible, the palliative care team has discussed with patient / health care proxy about goals of care / treatment preferences for patient. HISTORY:  
 
History obtained from:  
Principal Problem: 
  Obstructive hyperbilirubinemia (7/4/2019) Active Problems: Hypokalemia (7/25/2019) Jaundice (7/25/2019) Pancreatic adenocarcinoma ([facility] [address] (11/19/2018) Hypoalbuminemia due to protein-calorie malnutrition ([address] (7/26/2019) Goals of care, counseling/discussion () Past Medical History:  
Diagnosis Date  Asthma  Cancer ([address] 11/13/2018 Pancreatic cancer  Diabetes ([address]  Hypertension  Hypoalbuminemia due to protein-calorie malnutrition ([address] 7/26/2019  Ill-defined condition \"nerve problem\"  Pancreatic adenocarcinoma ([address]  Syphilis Past Surgical History:  
Procedure Laterality Date  BREAST SURGERY PROCEDURE UNLISTED    
 unsure of side-benign, lumpectomy  COLONOSCOPY N/A 9/27/2016 COLONOSCOPY with polypectomy. performed by Rachael Basurto MD at [address] [place] [address] GYN    
 patient unsure of surgery History reviewed. No pertinent family history. History reviewed, no pertinent family history. Social History Tobacco Use  Smoking status: Never Smoker  Smokeless tobacco: Never Used Substance Use Topics  Alcohol use: No  
  Frequency: Never No Known Allergies Current Facility-Administered Medications Medication Dose Route Frequency  dextrose (D50W) injection syrg 12.5-25 g  12.5-25 g IntraVENous PRN  
 lactulose (CHRONULAC) 10 gram/15 mL solution 45 mL  30 g Oral QID  vancomycin (VANCOCIN) 1000 mg in  ml infusion  1,000 mg IntraVENous Q18H  piperacillin-tazobactam (ZOSYN) 3.375 g in 0.9% sodium chloride (MBP/ADV) 100 mL MBP  3.375 g IntraVENous Q6H  
 acetaminophen (TYLENOL) tablet 500 mg  500 mg Oral PRN  
  diphenhydrAMINE (BENADRYL) capsule 25 mg  25 mg Oral PRN  
 sodium chloride (NS) flush 5-40 mL  5-40 mL IntraVENous Q8H  
 sodium chloride (NS) flush 5-40 mL  5-40 mL IntraVENous PRN  
 gelatin adsorbable (GELFOAM) 12-7 mm sponge 1 Each  1 Each Topical PRN  
 morphine injection 1 mg  1 mg IntraVENous Q8H PRN  
 dextrose 5% infusion  100 mL/hr IntraVENous CONTINUOUS  
 busPIRone (BUSPAR) tablet 10 mg  10 mg Oral TID  pantoprazole (PROTONIX) 40 mg in sodium chloride 0.9% 10 mL injection  40 mg IntraVENous Q12H  
 insulin lispro (HUMALOG) injection   SubCUTAneous AC&HS  
 glucose chewable tablet 16 g  4 Tab Oral PRN  
 glucagon (GLUCAGEN) injection 1 mg  1 mg IntraMUSCular PRN  
 furosemide (LASIX) injection 40 mg  40 mg IntraVENous PRN Clinical Pain Assessment (nonverbal scale for nonverbal patients): Clinical Pain Assessment Severity: 0 Activity (Movement): Lying quietly, normal position Duration: for how long has pt been experiencing pain (e.g., 2 days, 1 month, years) Frequency: how often pain is an issue (e.g., several times per day, once every few days, constant) FUNCTIONAL ASSESSMENT:  
 
Palliative Performance Scale (PPS): PPS: 20 
 
ECOG 
ECOG Status : Completely disabled PSYCHOSOCIAL/SPIRITUAL SCREENING:  
  
Any spiritual / Yazidi concerns: 
[] Yes /  [x] No 
 
Caregiver Burnout: 
[] Yes /  [] No /  [x] No Caregiver Present Anticipatory grief assessment:  
[x] Normal  / [] Maladaptive REVIEW OF SYSTEMS:  
 
Positive and pertinent negative findings in ROS are noted above in HPI. The following systems were [x] reviewed / [] unable to be reviewed as noted in HPI Other findings are noted below. Constitutional: AA female appears stated age mentation is impaired with current status unable to verbalize complex ideas or decisions. Eyes: pupils equal, severely icteric ENMT: no nasal discharge, moist mucous membranes Respiratory: breathing not labored, symmetric Gastrointestinal: soft non-tender Last bowel movement: 8/5/19 Musculoskeletal: no deformity, no tenderness to palpation Skin: warm, dry, jaundiced Neurologic:Alert and oriented to person and place only following commands, moving all extremities with assistance Systems: constitutional, ears/nose/mouth/throat, respiratory, gastrointestinal, genitourinary, musculoskeletal, integumentary, neurologic, psychiatric, endocrine. Positive findings noted below. Modified ESAS Completed by: provider Fatigue: 4 Drowsiness: 3 Pain: 0 Anxiety: 0 Dyspnea: 2 Stool Occurrence(s): 1 PHYSICAL EXAM:  
 
Wt Readings from Last 3 Encounters:  
08/06/19 69.9 kg (154 lb 1.6 oz)  
07/17/19 59 kg (130 lb) 05/06/19 53 kg (116 lb 12.8 oz) Blood pressure 93/53, pulse 98, temperature 98.2 °F (36.8 °C), resp. rate 12, height 4' 6\" (1.372 m), weight 69.9 kg (154 lb 1.6 oz), SpO2 99 %, not currently breastfeeding. Pain: 
Pain Scale 1: Adult Nonverbal Pain Scale Pain Intensity 1: 0 Pain Intervention(s) 1: Distraction, Repositioned LAB AND IMAGING FINDINGS:  
 
Lab Results Component Value Date/Time WBC 3.7 (L) 08/07/2019 03:20 AM  
 HGB 7.2 (L) 08/07/2019 03:20 AM  
 PLATELET 071 (L) 06/16/6449 03:20 AM  
 
Lab Results Component Value Date/Time Sodium 147 (H) 08/07/2019 03:20 AM  
 Potassium 3.3 (L) 08/07/2019 03:20 AM  
 Chloride 116 (H) 08/07/2019 03:20 AM  
 CO2 23 08/07/2019 03:20 AM  
 BUN 4 (L) 08/07/2019 03:20 AM  
 Creatinine 1.00 08/07/2019 03:20 AM  
 Calcium 8.2 (L) 08/07/2019 03:20 AM  
 Magnesium 1.7 08/05/2019 11:06 AM  
 Phosphorus 2.0 (L) 07/26/2019 09:35 AM  
  
Lab Results Component Value Date/Time AST (SGOT) 48 (H) 08/07/2019 03:20 AM  
 Alk.  phosphatase 75 08/07/2019 03:20 AM  
 Protein, total 4.5 (L) 08/07/2019 03:20 AM  
 Albumin 2.4 (L) 08/07/2019 03:20 AM  
 Globulin 2.1 08/07/2019 03:20 AM  
 
 Lab Results Component Value Date/Time INR 2.8 (H) 08/07/2019 03:20 AM  
 Prothrombin time 29.9 (H) 08/07/2019 03:20 AM  
 aPTT 63.6 (H) 08/07/2019 03:20 AM  
  
No results found for: IRON, FE, TIBC, IBCT, PSAT, FERR No results found for: PH, PCO2, PO2 No components found for: [place] No results found for: CPK, CKMB Total time: 40  minutes Counseling / coordination time, spent as noted above:  
> 50% counseling / coordination:  Time was spent in direct consultation with family, patient and medical team 
 
Prolonged service was provided for  []30 min   []75 min in face to face time in the presence of the patient, spent as noted above. Time Start:  
Time End:  
Note: this can only be billed with 91952 (initial) or 67827 (follow up). If multiple start / stop times, list each separately.

## 2019-08-07 NOTE — PROGRESS NOTES
NUTRITION Nutrition Screen RECOMMENDATIONS / PLAN:  
 
- Provide nutrition interventions consistent with goals of care. Diet as tolerated per SLP recommendations. - Continue IV fluid as medically appropriate.  
- Continue RD inpatient monitoring and evaluation. NUTRITION INTERVENTIONS & DIAGNOSIS:  
 
[x] Meals/snacks: modified composition, NPO pending swallow evaluation  
[x] IV fluid: D5 at 100 mL/hr (120 gm dextrose, 408 kcal per day) Nutrition Diagnosis: Unintended weight loss related to inadequate energy intake as evidenced by 56 lb, 29% weight loss x 3 years. Inadequate oral intake related to altered mentation, altered GI function with nausea/vomiting/abdominal pain as evidenced by poor meal intake since admission, NPO. 
 
ASSESSMENT:  
 
8/7: Palliative following and code status changed to DNR/DNI with no escalation of care and plan for discharge with hospice, case management following for disposition. NPO and confused with NGT clamped (placed 8/4 for medication administration) and SLP consulted today for swallow evaluation. 8/5: pt having worsening mental status; had hypotension. Was transferred to ICU on 8/4. Made NPO. MD addressing plan of care goals with family. Noted Palliative consulted. Has NGT in place 8/2: Unable to obtain much information from pt today. Denied having any abdominal pain. Does not report to this write about consuming of meals or nutrition supplements. Unable to obtain information from nursing. No nausea per GI report. Episodes of elevated blood glucose levels 7/31: Pt with fair appetite and meal intake; tolerating full liquid diet. Agreeable to nutrition supplements; does not want Ensure Enlive, agreeable to [employer] supplement. Po intake encouraged 7/30: Pt consuming clear liquids; poor/fair meal intake but tolerating diet per RN. Pt denied having any abdominal pain or N/V.  Ensure Clear is ordered, but pt not consuming, dislikes the supplement. Declined having it changed to [address] Discussed report and recommendation for advancing po diet to full liquids with Dr Anita Rushing; MD agreed with plan. 
7/29: Pt started on clear liquids. Reported good appetite/ meal intake; tolerating diet. Agreeable to nutrition supplement. Noted difficulties with MRCP per GI note today 7/26: Pt asleep/ lethargic at time of visit. Currently NPO for planned MRCP today. Average po intake adequate to meet patients estimated nutritional needs:   [] Yes     [x] No   [] Unable to determine at this time Diet: No diet orders on file Food Allergies: NKFA Current Appetite:   [] Good     [] Fair     [] Poor     [x] Other: NPO Appetite/meal intake prior to admission:   [] Good     [] Fair     [] Poor     [x] Other: unknown Feeding Limitations:  [] Swallowing difficulty    [] Chewing difficulty    [x] Other: mentation Current Meal Intake:  
Patient Vitals for the past 100 hrs: 
 % Diet Eaten 08/03/19 1647 0 % 08/03/19 1300 0 % 08/03/19 1121 0 % BM: 8/6; FMS Skin Integrity: WDL; jaundice Edema:   [] No     [x] Yes Pertinent Medications: Reviewed: lasix, SSI, lactulose, protonix Recent Labs 08/07/19 
0320 08/06/19 
1930 08/06/19 
0538 08/05/19 
1106 08/05/19 
0500 *  --  141  --  137  
K 3.3* 2.8* 2.8*  --  3.0*  
*  --  109  --  104 CO2 23  --  21  --  21  
GLU 91  --  148*  --  189* BUN 4*  --  5*  --  5* CREA 1.00  --  0.98  --  0.97 CA 8.2*  --  8.3*  --  8.2* MG  --   --   --  1.7 1.6 ALB 2.4*  --  2.8*  --  3.0*  
SGOT 48*  --  46*  --  38 ALT 23  --  24  --  19 Intake/Output Summary (Last 24 hours) at 8/7/2019 1412 Last data filed at 8/7/2019 0800 Gross per 24 hour Intake 2200 ml Output 3150 ml Net -950 ml Anthropometrics: 
Ht Readings from Last 1 Encounters:  
08/04/19 4' 6\" (1.372 m) Last 3 Recorded Weights in this Encounter 08/04/19 0600 08/05/19 0247 08/06/19 0459 Weight: 66.3 kg (146 lb 2.6 oz) 66.3 kg (146 lb 3.4 oz) 69.9 kg (154 lb 1.6 oz) Body mass index is 37.16 kg/m². Obese, Class II Weight History: 56 lb, 29% net weight loss x 3 years and fluctuations in weight PTA per chart hx review Weight Metrics 8/6/2019 7/17/2019 5/6/2019 12/24/2018 12/3/2018 9/27/2016 Weight 154 lb 1.6 oz 130 lb 116 lb 12.8 oz 116 lb 115 lb 193 lb BMI 37.16 kg/m2 25.39 kg/m2 22.81 kg/m2 22.65 kg/m2 22.46 kg/m2 37.69 kg/m2 Admitting Diagnosis: Hypokalemia [E87.6] Jaundice [R17] Pertinent PMHx: pancreatic cancer s/p whipple procedure on 4/19, DM, HTN Education Needs:        [x] None identified  [] Identified - Not appropriate at this time  []  Identified and addressed - refer to education log Learning Limitations:   [] None identified  [x] Identified: altered mentation, limited responsiveness Cultural, Christianity & ethnic food preferences:  [x] None identified    [] Identified and addressed ESTIMATED NUTRITION NEEDS:  
 
Calories: 5955-0648 kcal (HBEx1.2-1.35) based on  [x] Actual BW 62 kg      [] IBW Protein: 62-93 gm (1-1.5 gm/kg) based on  [x] Actual BW      [] IBW Fluid: 1 mL/kcal 
  
MONITORING & EVALUATION:  
 
Nutrition Goal(s): goal modified 1. Provide nutrition intervention as appropriate with goals of care for the next 5-7 days. Outcome:  [] Met/Ongoing    [] Progressing towards goal    [] Not progressing towards goal    [x] New/Initial goal 
 
Monitoring:   [x] Food and beverage intake   [x] Diet order   [x] Nutrition-focused physical findings   [x] Treatment/therapy   [] Weight   [] Enteral nutrition intake Previous Recommendations (for follow-up assessments only):     []   Implemented       []   Not Implemented (RD to address)      [] No Longer Appropriate     [x] No Recommendation Made Discharge Planning: oral diet as tolerated for comfort, consistency per SLP recommendations [x] Participated in care planning, discharge planning, & interdisciplinary rounds as appropriate Viridiana Alvarez RD, [facility] Pager: [phone]

## 2019-08-07 NOTE — PROGRESS NOTES
[facility] 
 
 
CARDIOLOGY PROGRESS NOTE 
RECS: 
 
 
1. Sinus tachycardia- better. Treat underlying cause. Recent echo showed hyperdynamic systolic function with EF 65%-70%- will continue to monitor 2. Hypotensive- low BP. Intermittent hypotension. Asymptomatic. consider low dose midodrine if needed 3. Pancreatic adenocarcinoma s/p Whipple procedure 4/2019 with adjuvant radiation in [facility]- oncology following 4. Hyperbilirubinemia with jaundice- persistent 5. Severe Anemia-no active bleeding. S/p 1 unit PRBC's 8/3/19 6. Coagulopathy-  Persistent. Getting  vitamin K iv - not a candidate for liver transplant due to Hx of malignancy. S/p liver biopsy 8/1/19-No evidence of malignancy  seen. SEVERE STEATOSIS (GREATER THAN 80%).     
7. Edema- persistent  likely related to third spacing from multiple problems including hypoalbuminemia. Lasix as needed if BP tolerates it.   
8. Altered mental status -poorly responsive-  transferred to ICU  with increased ammonia levels - patient on lactulose 9. Hypokalemia - replete per protocol by [facility] 10. Lactic acidosis- w/u per [facility] Poor prognosis Continue supportive care. Patient is DNR/DNI with plans to move to hospice upon discharge. ASSESSMENT: 
Hospital Problems  Date Reviewed: 7/17/2019 Codes Class Noted POA Goals of care, counseling/discussion ICD-10-CM: Z71.89 ICD-9-CM: V65.49  Unknown Unknown Hypoalbuminemia due to protein-calorie malnutrition ([facility] [address] ICD-10-CM: E46 
ICD-9-CM: 263.9  7/26/2019 Yes Hypokalemia ICD-10-CM: E87.6 ICD-9-CM: 276.8  7/25/2019 Yes Jaundice ICD-10-CM: R17 
ICD-9-CM: 782.4  7/25/2019 Yes * (Principal) Obstructive hyperbilirubinemia (Chronic) ICD-10-CM: T41.9 ICD-9-CM: 576.8  7/4/2019 Yes Pancreatic adenocarcinoma ([facility]) (Chronic) ICD-10-CM: C25.9 ICD-9-CM: 157.9  11/19/2018 Yes SUBJECTIVE: 
 
More alert today. No SOB no chest pain OBJECTIVE: 
 
VS:  
Visit Vitals BP 93/53 (BP 1 Location: Left arm, BP Patient Position: At rest) Pulse 98 Temp 98.2 °F (36.8 °C) Resp 12 Ht 4' 6\" (1.372 m) Wt 69.9 kg (154 lb 1.6 oz) SpO2 99% Breastfeeding? No  
BMI 37.16 kg/m² Intake/Output Summary (Last 24 hours) at 8/7/2019 1131 Last data filed at 8/7/2019 0800 Gross per 24 hour Intake 3250 ml Output 3150 ml Net 100 ml TELE: sinus rhythm General: in no apparent distress and does not respond to commands, very weak poorly responsive. HENT: Normocephalic, atraumatic. Icteric sclera and skin Neck :  no JVD Cardiac:  regular rate and rhythm, S1, S2 normal, no murmur, click, rub or gallop Lungs: clear to auscultation bilaterally Abdomen: Soft, nontender, no masses Extremities:  Edema +2  Up to upper thighs and lower back. peripheral pulses present Labs: Results:  
   
Chemistry Recent Labs 08/07/19 
0320 08/06/19 
1930 08/06/19 
0538 08/05/19 
0500 GLU 91  --  148* 189* *  --  141 137  
K 3.3* 2.8* 2.8* 3.0*  
*  --  109 104 CO2 23  --  21 21 BUN 4*  --  5* 5*  
CREA 1.00  --  0.98 0.97 CA 8.2*  --  8.3* 8.2* AGAP 8  --  11 12 BUCR 4*  --  5* 5* AP 75  --  74 70  
TP 4.5*  --  4.9* 4.8* ALB 2.4*  --  2.8* 3.0*  
GLOB 2.1  --  2.1 1.8* AGRAT 1.1  --  1.3 1.7 CBC w/Diff Recent Labs 08/07/19 
0320 08/06/19 
0420 08/05/19 
0500 WBC 3.7* 5.2 5.4  
RBC 2.10* 2.23* 2.12* HGB 7.2* 7.5* 7.2* HCT 20.3* 21.3* 20.4* * 145 103* GRANS 63 66 78* LYMPH 22 12* 11* EOS 4 2 0 Cardiac Enzymes No results for input(s): CPK, CKND1, SANDIE in the last 72 hours. No lab exists for component: Claudetta Friar Coagulation Recent Labs 08/07/19 
0320 08/06/19 
0548 PTP 29.9* 29.6* INR 2.8* 2.8* APTT 63.6* 69.3* Lipid Panel Lab Results Component Value Date/Time  Cholesterol, total 102 05/08/2019 09:08 AM  
 HDL Cholesterol 48 05/08/2019 09:08 AM  
 LDL, calculated 42.4 05/08/2019 09:08 AM  
 VLDL, calculated 11.6 05/08/2019 09:08 AM  
 Triglyceride 58 05/08/2019 09:08 AM  
 CHOL/HDL Ratio 2.1 05/08/2019 09:08 AM  
  
BNP No results for input(s): BNPP in the last 72 hours. Liver Enzymes Recent Labs 08/07/19 
0320 TP 4.5* ALB 2.4* AP 75 SGOT 48* Thyroid Studies Lab Results Component Value Date/Time TSH 1.85 08/01/2019 05:45 AM  
    
 
 
 
eBcki Amado NP-C I have independently evaluated taken history and examined the patient. All relevant labs and testing data's are reviewed. Care plan discussed and updated after review.  
Ryan Hazel MD

## 2019-08-07 NOTE — PROGRESS NOTES
[facility] Hospitalist Group Progress Note Patient: Alex August Age: 61 y.o. : 1955 MR#: [id number] SSN: [id number] Date/Time: 2019 Subjective:  
 
Pt admitted to hosp for elevated Bilirubin & obstructive jaundice in the setting of H/o Pancreatic carcinoma - s/p liver Bx but T Bili remains elevated Unfortunately pt is not doing well Her Liver fxn continues to deteriorate Assessment/Plan: 1. Sepsis - elevated lactic acid - unclear etiology - is currently on IV vanco & Zosyn - cx negative so far 2. Obstructive jaundice with hyperbilirubinemia - Has had whipple's procedure done -  GI consulted , MRCP report reviewed - CT negative for obstruction 3. Hepatology note reviewed - Vit K 10mg x 3 for 3 days - Liver bx done by IR today - Results reviewed - shows Hepatic steatosis -No  improvement in INR despite Vit K  
4. H/o Pancreatic cancer - start creon when able to tolerate - Oncology on board  - CTA chest done 2y to Tachycardia - negative 5. Swallow eval today 6. Chronic anemia - monitor H&H   
7. Mild elevation in LFT's - monitor 8. Tachycardia - Cardiology input appreciated 9. Urinary retention - perez placed 10. Ammonia levels elevated - started on lactulose with some improvement - NGT placed 11. Palliative care consult DVT px - elevated INR  
DNR Palliative care on board & plan to have meeting with sister in AM regarding [address] - pt is now DNR Case discussed with:  [x]Patient  []Family  []Nursing  []Case Management DVT Prophylaxis:  []Lovenox  []Hep SQ  []SCDs  []Coumadin   []On Heparin gtt Objective:  
VS:  
Visit Vitals BP 93/53 (BP 1 Location: Left arm, BP Patient Position: At rest) Pulse 98 Temp 98.2 °F (36.8 °C) Resp 12 Ht 4' 6\" (1.372 m) Wt 69.9 kg (154 lb 1.6 oz) SpO2 99% Breastfeeding? No  
BMI 37.16 kg/m² Tmax/24hrs: Temp (24hrs), Av.3 °F (36.8 °C), Min:98.2 °F (36.8 °C), Max:98.5 °F (36.9 °C) IOBRIEF Intake/Output Summary (Last 24 hours) at 8/7/2019 1324 Last data filed at 8/7/2019 0800 Gross per 24 hour Intake 2200 ml Output 3150 ml Net -950 ml General:  Confused HEENT: PERRLA, icteric sclera & skin . Pulmonary:  CTA Bilaterally. No Wheezing/Rhonchi/Rales. Cardiovascular: Regular rate and Rhythm. GI:  Soft, Non distended, Non tender. + Bowel sounds. Extremities:  2+ edema, cyanosis, clubbing. No calf tenderness. Neurologic: confused likely from elevated ammonia Additional: 
 
Medications:  
Current Facility-Administered Medications Medication Dose Route Frequency  albumin human 5% (BUMINATE) solution 25 g  25 g IntraVENous Q6H  
 dextrose (D50W) injection syrg 12.5-25 g  12.5-25 g IntraVENous PRN  
 lactulose (CHRONULAC) 10 gram/15 mL solution 45 mL  30 g Oral QID  vancomycin (VANCOCIN) 1000 mg in  ml infusion  1,000 mg IntraVENous Q18H  piperacillin-tazobactam (ZOSYN) 3.375 g in 0.9% sodium chloride (MBP/ADV) 100 mL MBP  3.375 g IntraVENous Q6H  
 acetaminophen (TYLENOL) tablet 500 mg  500 mg Oral PRN  
 diphenhydrAMINE (BENADRYL) capsule 25 mg  25 mg Oral PRN  
 sodium chloride (NS) flush 5-40 mL  5-40 mL IntraVENous Q8H  
 sodium chloride (NS) flush 5-40 mL  5-40 mL IntraVENous PRN  
 gelatin adsorbable (GELFOAM) 12-7 mm sponge 1 Each  1 Each Topical PRN  
 morphine injection 1 mg  1 mg IntraVENous Q8H PRN  
 dextrose 5% infusion  100 mL/hr IntraVENous CONTINUOUS  
 busPIRone (BUSPAR) tablet 10 mg  10 mg Oral TID  pantoprazole (PROTONIX) 40 mg in sodium chloride 0.9% 10 mL injection  40 mg IntraVENous Q12H  
 insulin lispro (HUMALOG) injection   SubCUTAneous AC&HS  
 glucose chewable tablet 16 g  4 Tab Oral PRN  
 glucagon (GLUCAGEN) injection 1 mg  1 mg IntraMUSCular PRN  
 furosemide (LASIX) injection 40 mg  40 mg IntraVENous PRN Labs:   
Recent Results (from the past 24 hour(s)) GLUCOSE, POC  
 Collection Time: 08/06/19  4:43 PM  
Result Value Ref Range Glucose (POC) 153 (H) 70 - 110 mg/dL GLUCOSE, POC Collection Time: 08/06/19  5:21 PM  
Result Value Ref Range Glucose (POC) 267 (H) 70 - 110 mg/dL LACTIC ACID Collection Time: 08/06/19  7:30 PM  
Result Value Ref Range Lactic acid 3.3 (HH) 0.4 - 2.0 MMOL/L  
POTASSIUM Collection Time: 08/06/19  7:30 PM  
Result Value Ref Range Potassium 2.8 (LL) 3.5 - 5.5 mmol/L  
GLUCOSE, POC Collection Time: 08/06/19 10:28 PM  
Result Value Ref Range Glucose (POC) 96 70 - 110 mg/dL AMMONIA Collection Time: 08/07/19  1:15 AM  
Result Value Ref Range Ammonia 30 11 - 32 UMOL/L  
PTT Collection Time: 08/07/19  3:20 AM  
Result Value Ref Range aPTT 63.6 (H) 23.0 - 36.4 SEC PROTHROMBIN TIME + INR Collection Time: 08/07/19  3:20 AM  
Result Value Ref Range Prothrombin time 29.9 (H) 11.5 - 15.2 sec INR 2.8 (H) 0.8 - 1.2 FIBRINOGEN Collection Time: 08/07/19  3:20 AM  
Result Value Ref Range Fibrinogen 81 (L) 210 - 451 mg/dL CBC WITH AUTOMATED DIFF Collection Time: 08/07/19  3:20 AM  
Result Value Ref Range WBC 3.7 (L) 4.6 - 13.2 K/uL  
 RBC 2.10 (L) 4.20 - 5.30 M/uL HGB 7.2 (L) 12.0 - 16.0 g/dL HCT 20.3 (L) 35.0 - 45.0 % MCV 96.7 74.0 - 97.0 FL  
 MCH 34.3 (H) 24.0 - 34.0 PG  
 MCHC 35.5 31.0 - 37.0 g/dL RDW 20.1 (H) 11.6 - 14.5 % PLATELET 980 (L) 720 - 420 K/uL MPV 10.8 9.2 - 11.8 FL  
 NEUTROPHILS 63 40 - 73 % LYMPHOCYTES 22 21 - 52 % MONOCYTES 11 (H) 3 - 10 % EOSINOPHILS 4 0 - 5 % BASOPHILS 0 0 - 2 %  
 ABS. NEUTROPHILS 2.3 1.8 - 8.0 K/UL  
 ABS. LYMPHOCYTES 0.8 (L) 0.9 - 3.6 K/UL  
 ABS. MONOCYTES 0.4 0.05 - 1.2 K/UL  
 ABS. EOSINOPHILS 0.2 0.0 - 0.4 K/UL  
 ABS. BASOPHILS 0.0 0.0 - 0.1 K/UL  
 DF AUTOMATED HEPATIC FUNCTION PANEL Collection Time: 08/07/19  3:20 AM  
Result Value Ref Range Protein, total 4.5 (L) 6.4 - 8.2 g/dL Albumin 2.4 (L) 3.4 - 5.0 g/dL Globulin 2.1 2.0 - 4.0 g/dL A-G Ratio 1.1 0.8 - 1.7 Bilirubin, total 15.1 (H) 0.2 - 1.0 MG/DL Bilirubin, direct 10.6 (H) 0.0 - 0.2 MG/DL Alk. phosphatase 75 45 - 117 U/L  
 AST (SGOT) 48 (H) 10 - 38 U/L  
 ALT (SGPT) 23 13 - 56 U/L  
AMMONIA Collection Time: 08/07/19  3:20 AM  
Result Value Ref Range Ammonia 48 (H) 11 - 32 UMOL/L  
METABOLIC PANEL, BASIC Collection Time: 08/07/19  3:20 AM  
Result Value Ref Range Sodium 147 (H) 136 - 145 mmol/L Potassium 3.3 (L) 3.5 - 5.5 mmol/L Chloride 116 (H) 100 - 111 mmol/L  
 CO2 23 21 - 32 mmol/L Anion gap 8 3.0 - 18 mmol/L Glucose 91 74 - 99 mg/dL BUN 4 (L) 7.0 - 18 MG/DL Creatinine 1.00 0.6 - 1.3 MG/DL  
 BUN/Creatinine ratio 4 (L) 12 - 20 GFR est AA >60 >60 ml/min/1.73m2 GFR est non-AA 56 (L) >60 ml/min/1.73m2 Calcium 8.2 (L) 8.5 - 10.1 MG/DL  
GLUCOSE, POC Collection Time: 08/07/19  8:17 AM  
Result Value Ref Range Glucose (POC) 120 (H) 70 - 110 mg/dL GLUCOSE, POC Collection Time: 08/07/19 12:22 PM  
Result Value Ref Range Glucose (POC) 110 70 - 110 mg/dL Signed By: Rossana Esposito MD   
 August 7, 2019

## 2019-08-07 NOTE — PROGRESS NOTES
Met with pt's brother Yojana Grande and pt's sister in-law Sandy Osorio. At this time, pt's family is leaning towards hospice. Joseph Dollanika [facility] second choice for placement when appropriate. Case Management is following. 1318: Pt's clinicals matched with [address] [place] [address] in [place] and [facility] in [address]. Notified [facility] of [address] [place] [address] and left a message for [facility]. Demetrius Leggett, BSN RN Care Management Pager: [phone]

## 2019-08-08 NOTE — PROGRESS NOTES
Bedside and Verbal shift change report given to Georgia Mckenna RN (oncoming nurse) by Mary Oliveira RN (offgoing nurse). Report included the following information SBAR, Kardex, Intake/Output, MAR, Accordion, Recent Results, Med Rec Status and Cardiac Rhythm ST.

## 2019-08-08 NOTE — ACP (ADVANCE CARE PLANNING)
Palliative Medicine Pt does have an Advance Directive on file in EMR. Primary Decision Maker (Health Care Agent): Fatmata Quiñonez Relationship to patient: Sister-in-law Phone number: [phone] [x] Named in a scanned document  
[] Legal Next of Kin 
[] Guardian Secondary Decision Maker (First Alternate Health Care Agent): Aleena Miguel Relationship to patient: Brother Phone number: [phone] [x] Named in a scanned document  
[] Legal Next of Kin 
[] Guardian ACP documents you currently have include: 
[x] Advance Directive or Living Will 
[] Durable Do Not Resuscitate [x] Physician Orders for Scope of Treatment (POST) [] Medical Power of  
[] Other POST form placed on the chart for scanning into EMR. POST form denotes DDNR status, comfort measures upon discharge to facility and NO feeding tube. Pt remains DNR/DNI. Dispo plan is to facility with hospice this afternoon. Thank you for the Palliative Medicine consult and allowing us to participate in the care of Ms. Garcia. Will continue to monitor and provide support. Gallo Carreon, CORINNEN Palliative Medicine [facility]patient [facility] Line: [phone] ([phone])

## 2019-08-08 NOTE — PROGRESS NOTES
Palliative Medicine Consult [facility]: [phone] ([phone]) [facility]: [phone] [facility]: [phone] Patient Name: Alex Valderrama YOB: 1955 Date of Initial Consult: 8/5/19 Reason for Consult: Goals of care discussion Requesting Provider: India Meyer MD 
Primary Care Physician: Jp Plummer MD 
  
 SUMMARY:  
Alex Valderrama is a 61 y.o. female with a past history of pancreatic cancer, diabetes, HTN , who was admitted on 7/25/2019 from  with a diagnosis of [ancytopenia with liver failure. Current medical issues leading to Palliative Medicine involvement include: Discussion of goals of care. CHIEF COMPLAINT: Altered mental status HPI/SUBJECTIVE:   
Pt is a 61year old AA female that has been treated through her Hemotologist for Pancreatic adenocarcinoma. Patient with jaundice and deeply icteric. Question of cholestasis possibly 5FU induced. Patient with hepatic encephalopathy and liver failure. Deemed non operable and not a candidate for a transplant secondary to continued malignancy. Pt underwent a whipple procedure on 4/19. Current treatment includes NGT with supportive care. Calculated meld is 28 indicating that patient would have a survival prognosis of 3 month. The patient is:  
[] Verbal and participatory [x] Non-participatory due to: Woke up for a short time. Unable to have complex conversations GOALS OF CARE: 
Patient/Health Care Proxy Stated Goals: Comfort TREATMENT PREFERENCES:  
Code Status: DNR At this time patient is a DNR/DNI with NO Escalation of Care including high flow oxygen or IV pressors. Plan is for Hospice upon discharge to the facility. PALLIATIVE DIAGNOSES:  
1. Goals of Care conversation/ACP 2. Pancreatic adenocarcinoma 3. Hyperbilirubinemia with jaundice 4. Severe protein calorie malnutrition PLAN:  
1. Goals of Care conversation/ACP 
8/8/19 Met with patient and her physician at her room along with Palliative team member Funmi Colin RN. Pt relays that she is angry and upset that her sister in law promised her that she would not have to go to a [facility] [address], but has not found out that she is being discharged to [facility]. Reports that she is feeling anxious about the move. Patient continues to have limited understanding of her overall situation and health. Stating \"I just like to walk to the laundry\" Offered supportive listening and assistance with her breakfast tray. Pt awaiting discharge to [facility] [address] where she will be receiving hospice support. Dr Abdullahi Reaves will be putting in an order for mild antianxiety medication for patient prior to discharge. At this time patient remains a DNR/DNI with full interventions. See below for previous conversations:  
 
8/7/19 Palliative team including Karuna Hernandez RN met with patients SIMON sister in law Asher Good and patients brother Jorge Lantigua who is patients secondary decision maker. Discussion regarding benefits and burdens of escalation of treatment and resuscitation with consideration of patients prognosis. SIMON states that she knows what Jerome Stewart would want and wants her to be moved to a LTC facility with hospice support. Spoke with patients attending who is in agreement with this plan. Spoke with CM and medical team in the step down unit and they understand that patient is now a DNR/DNI. Orders placed for DNR/DNI and No Escalation of Care including no IV Vaso Pressors and No High Flow oxygen. Hospice order placed for evaluation and treatment if eligible. At this time medical team and family believe that patient is not able to make these higher level decisions due to some confusion and difficulty with concentration. 8/6/19 Palliative team including this NP and Karuna Hernandez met with this patient and found her to be competent enough to accept an AMD from her for the section on who she would trust to make her decisions for her in the event that she can not make them. When asked patient was able to on several occasions named her sister in law Florence Shahid and brother Ignacia Jaime as her decision makers. Patient stated on at least three occasions that her daughter Joshua Anton not help me\". Palliative team called [facility] and informed her that patient had chosen her and she agreed to take on that responsibility. Have set up to meet with her at 4pm today to review goals of care status. At 5:50 sister in law had not yet shown up for our meeting will attempt again for tomorrow. Meeting with patients daughter Saumya Ashton, sister in law Florence Shahid, patients brother, and niece along with Dr Mariela Deleon hospitalist and Frank Schmidt RN from [address] [place] [address]. Informed family of patient condition and prognosis with burdens and benefits of ongoing treatment. Daughter extremely distressed saying she wants everything done to keep her mother alive. Had a hard time seeing her mother in this condition, but because her mother was at this time alert and speaking this NP led her into the room to have a conversation with her. Note that patients sister in law states that she will speak with [facility] and she thinks she will come around to understanding that resuscitation and intubation would not help her mother to any road to recovery. Plan to follow up tomorrow and continue to offer supportive listening and open communication and information. 2.   Pancreatic Adenocarcinoma Patient being followed through hematologist for treatment. Last note indicated that plan was to continue to offer supportive care and no recommendation for mechanical ventilation in the event of a CP arrest.  
3.   Hyperbilirubinemia with jaundice Reported as likely obstructive vs 5 FU related also being managed medically through hematology. GI note that patient is not a transplant candidate due to continued  malignancy 4. Malnutrition Patient with low albumin levels, protein levels and globulin levels. NGT in place for temporary nutrition. Will continue to educate and work with family to establish goals for pt 5. Initial consult note routed to primary continuity provider 6   Communicated plan of care with: Palliative IDT Advance Care Planning: 
[x] The [facility] Interdisciplinary Team has updated the ACP Navigator with [address] and Patient Capacity Primary Decision Maker ([address]): Patient has named her sister in law Daly Daly as primary and secondary her brother Evy Hartman Medical Interventions: Comfort measures Other Instructions: will continue to educate and support patient and family. Artificially Administered Nutrition: No feeding tube As far as possible, the palliative care team has discussed with patient / health care proxy about goals of care / treatment preferences for patient. HISTORY:  
 
History obtained from:  
Principal Problem: 
  Obstructive hyperbilirubinemia (7/4/2019) Active Problems: Hypokalemia (7/25/2019) Jaundice (7/25/2019) Pancreatic adenocarcinoma ([address] (11/19/2018) Hypoalbuminemia due to protein-calorie malnutrition ([address] (7/26/2019) Goals of care, counseling/discussion () Past Medical History:  
Diagnosis Date  Asthma  Cancer ([address] 11/13/2018 Pancreatic cancer  Diabetes ([address]  Hypertension  Hypoalbuminemia due to protein-calorie malnutrition ([address] 7/26/2019  Ill-defined condition \"nerve problem\"  Pancreatic adenocarcinoma ([address]  Syphilis Past Surgical History:  
Procedure Laterality Date  BREAST SURGERY PROCEDURE UNLISTED    
 unsure of side-benign, lumpectomy  COLONOSCOPY N/A 9/27/2016 COLONOSCOPY with polypectomy. performed by Julio C Cee MD at [address] [place] [address] GYN    
 patient unsure of surgery History reviewed. No pertinent family history. History reviewed, no pertinent family history. Social History Tobacco Use  Smoking status: Never Smoker  Smokeless tobacco: Never Used Substance Use Topics  Alcohol use: No  
  Frequency: Never No Known Allergies Current Facility-Administered Medications Medication Dose Route Frequency  potassium chloride 20 mEq in 100 ml IVPB  20 mEq IntraVENous Q2H  
 dextrose (D50W) injection syrg 12.5-25 g  12.5-25 g IntraVENous PRN  
 lactulose (CHRONULAC) 10 gram/15 mL solution 45 mL  30 g Oral QID  acetaminophen (TYLENOL) tablet 500 mg  500 mg Oral PRN  
 diphenhydrAMINE (BENADRYL) capsule 25 mg  25 mg Oral PRN  
 sodium chloride (NS) flush 5-40 mL  5-40 mL IntraVENous Q8H  
 sodium chloride (NS) flush 5-40 mL  5-40 mL IntraVENous PRN  
 gelatin adsorbable (GELFOAM) 12-7 mm sponge 1 Each  1 Each Topical PRN  
 morphine injection 1 mg  1 mg IntraVENous Q8H PRN  
 busPIRone (BUSPAR) tablet 10 mg  10 mg Oral TID  pantoprazole (PROTONIX) 40 mg in sodium chloride 0.9% 10 mL injection  40 mg IntraVENous Q12H  
 insulin lispro (HUMALOG) injection   SubCUTAneous AC&HS  
 glucose chewable tablet 16 g  4 Tab Oral PRN  
 glucagon (GLUCAGEN) injection 1 mg  1 mg IntraMUSCular PRN  
 furosemide (LASIX) injection 40 mg  40 mg IntraVENous PRN Clinical Pain Assessment (nonverbal scale for nonverbal patients): Clinical Pain Assessment Severity: 0 Activity (Movement): Lying quietly, normal position Duration: for how long has pt been experiencing pain (e.g., 2 days, 1 month, years) Frequency: how often pain is an issue (e.g., several times per day, once every few days, constant) FUNCTIONAL ASSESSMENT:  
 
Palliative Performance Scale (PPS): PPS: 30 
 
ECOG 
ECOG Status : Completely disabled PSYCHOSOCIAL/SPIRITUAL SCREENING:  
  
Any spiritual / Shinto concerns: 
[] Yes /  [x] No 
 
Caregiver Burnout: 
[] Yes /  [] No /  [x] No Caregiver Present Anticipatory grief assessment:  
[x] Normal  / [] Maladaptive REVIEW OF SYSTEMS:  
 
Positive and pertinent negative findings in ROS are noted above in HPI. The following systems were [x] reviewed / [] unable to be reviewed as noted in HPI Other findings are noted below. Constitutional: AA female appears stated age mentation is impaired with current status unable to verbalize complex ideas or decisions. Eyes: pupils equal, severely icteric ENMT: no nasal discharge, moist mucous membranes Respiratory: breathing not labored, symmetric Gastrointestinal: soft non-tender Last bowel movement: 8/5/19 Musculoskeletal: no deformity, no tenderness to palpation Skin: warm, dry, jaundiced Neurologic:Alert and oriented to person and place only following commands, moving all extremities with assistance Systems: constitutional, ears/nose/mouth/throat, respiratory, gastrointestinal, genitourinary, musculoskeletal, integumentary, neurologic, psychiatric, endocrine. Positive findings noted below. Modified ESAS Completed by: provider Fatigue: 0 Drowsiness: 0 Pain: 0 Anxiety: 3 Anorexia: 3 Dyspnea: 2 Stool Occurrence(s): 0 PHYSICAL EXAM:  
 
Wt Readings from Last 3 Encounters:  
08/06/19 69.9 kg (154 lb 1.6 oz)  
07/17/19 59 kg (130 lb) 05/06/19 53 kg (116 lb 12.8 oz) Blood pressure 105/81, pulse (!) 103, temperature 97.5 °F (36.4 °C), resp. rate 14, height 4' 6\" (1.372 m), weight 69.9 kg (154 lb 1.6 oz), SpO2 96 %, not currently breastfeeding. Pain: 
Pain Scale 1: Numeric (0 - 10) Pain Intensity 1: 0 Pain Intervention(s) 1: Distraction, Repositioned LAB AND IMAGING FINDINGS:  
 
Lab Results Component Value Date/Time  WBC 3.7 (L) 08/07/2019 03:20 AM  
 HGB 7.2 (L) 08/07/2019 03:20 AM  
 PLATELET 845 (L) 93/28/3316 03:20 AM  
 
Lab Results Component Value Date/Time Sodium 147 (H) 08/08/2019 04:20 AM  
 Potassium 3.1 (L) 08/08/2019 04:20 AM  
 Chloride 115 (H) 08/08/2019 04:20 AM  
 CO2 20 (L) 08/08/2019 04:20 AM  
 BUN 3 (L) 08/08/2019 04:20 AM  
 Creatinine 1.07 08/08/2019 04:20 AM  
 Calcium 8.8 08/08/2019 04:20 AM  
 Magnesium 1.7 08/05/2019 11:06 AM  
 Phosphorus 2.0 (L) 07/26/2019 09:35 AM  
  
Lab Results Component Value Date/Time AST (SGOT) 43 (H) 08/08/2019 04:20 AM  
 Alk. phosphatase 71 08/08/2019 04:20 AM  
 Protein, total 5.2 (L) 08/08/2019 04:20 AM  
 Albumin 3.4 08/08/2019 04:20 AM  
 Globulin 1.8 (L) 08/08/2019 04:20 AM  
 
Lab Results Component Value Date/Time INR 3.0 (H) 08/08/2019 03:38 AM  
 Prothrombin time 31.5 (H) 08/08/2019 03:38 AM  
 aPTT 73.4 (H) 08/08/2019 03:38 AM  
  
No results found for: IRON, FE, TIBC, IBCT, PSAT, FERR No results found for: PH, PCO2, PO2 No components found for: [place] No results found for: CPK, CKMB Total time: 25 minutes Counseling / coordination time, spent as noted above:  
> 50% counseling / coordination:  Time was spent in direct consultation with family, patient and medical team 
 
Prolonged service was provided for  []30 min   []75 min in face to face time in the presence of the patient, spent as noted above. Time Start:  
Time End:  
Note: this can only be billed with 78031 (initial) or 78465 (follow up). If multiple start / stop times, list each separately.

## 2019-08-08 NOTE — PROGRESS NOTES
0700 Bedside and Verbal shift change report given to Ayana Roberts RN 
 (oncoming nurse) by Brigid Rocha (offgoing nurse). Report included the following information SBAR, Kardex, ED Summary, Intake/Output, MAR, Recent Results and Cardiac Rhythm NSR.  
 
1615 TRANSFER to [facility] Nursing Verbal report received from [facility] RN to Radnor LPN (name) on Tito Barajas  being received from [facility](ICU) for Hospice Report consisted of patients Situation, Background, Assessment and  
Recommendations(SBAR). Information from the following report(s) SBAR, Kardex, ED Summary, Intake/Output, MAR and Recent Results was reviewed with the receiving nurse. Opportunity for questions and clarification was provided. Assessment completed upon patients arrival to unit and care assumed. 1818 Patient left unit to go to [facility] for Hospice. Discharged 1819 This nurse called [phone] to let them aware that patient is in route to [facility]

## 2019-08-08 NOTE — PROGRESS NOTES
NUTRITION Nutrition Screen RECOMMENDATIONS / PLAN:  
 
- Continue diet as tolerated. - Continue RD inpatient monitoring and evaluation. NUTRITION INTERVENTIONS & DIAGNOSIS:  
 
[x] Meals/snacks: modified composition Nutrition Diagnosis: Unintended weight loss related to inadequate energy intake as evidenced by 56 lb, 29% weight loss x 3 years. Inadequate oral intake related to altered mentation, altered GI function with nausea/vomiting/abdominal pain as evidenced by poor meal intake since admission. ASSESSMENT:  
 
8/8: NGT removed and diet started after swallow evaluation yesterday. Pt denies nausea and states she ate some of dinner yesterday, tolerating diet; also reports significant weight loss PTA. Hospice consulted and decision made to proceed with hospice services on discharge. 8/7: Palliative following and code status changed to DNR/DNI with no escalation of care and plan for discharge with hospice, case management following for disposition. NPO and confused with NGT clamped (placed 8/4 for medication administration) and SLP consulted today for swallow evaluation. 8/5: pt having worsening mental status; had hypotension. Was transferred to ICU on 8/4. Made NPO. MD addressing plan of care goals with family. Noted Palliative consulted. Has NGT in place 8/2: Unable to obtain much information from pt today. Denied having any abdominal pain. Does not report to this write about consuming of meals or nutrition supplements. Unable to obtain information from nursing. No nausea per GI report. Episodes of elevated blood glucose levels 7/31: Pt with fair appetite and meal intake; tolerating full liquid diet. Agreeable to nutrition supplements; does not want Ensure Enlive, agreeable to [employer] supplement. Po intake encouraged 7/30: Pt consuming clear liquids; poor/fair meal intake but tolerating diet per RN. Pt denied having any abdominal pain or N/V.  Ensure Clear is ordered, but pt not consuming, dislikes the supplement. Declined having it changed to [address] Discussed report and recommendation for advancing po diet to full liquids with Dr Mony Ahuja; MD agreed with plan. 
7/29: Pt started on clear liquids. Reported good appetite/ meal intake; tolerating diet. Agreeable to nutrition supplement. Noted difficulties with MRCP per GI note today 7/26: Pt asleep/ lethargic at time of visit. Currently NPO for planned MRCP today. Average po intake adequate to meet patients estimated nutritional needs:   [] Yes     [x] No   [] Unable to determine at this time Diet: DIET DENTAL SOFT (SOFT SOLID) Food Allergies: NKFA Current Appetite:   [] Good     [x] Fair, minimal po intake     [] Poor     [x] Other: weakness Appetite/meal intake prior to admission:   [] Good     [] Fair     [] Poor     [x] Other: patient states she was eating well PTA Feeding Limitations:  [x] Swallowing difficulty: SLP evaluation 8/7    [] Chewing difficulty    [x] Other: mentation Current Meal Intake:  
No data found. BM: 8/7, loose Skin Integrity: WDL; jaundice Edema:   [] No     [x] Yes Pertinent Medications: Reviewed: lasix, SSI, lactulose, protonix Recent Labs 08/08/19 
3402 08/07/19 
1515 08/07/19 
0320  08/06/19 
0538 08/05/19 
1106 *  --  147*  --  141  --   
K 3.1* 3.4* 3.3*   < > 2.8*  --   
*  --  116*  --  109  --   
CO2 20*  --  23  --  21  --   
*  --  91  --  148*  --   
BUN 3*  --  4*  --  5*  --   
CREA 1.07  --  1.00  --  0.98  --   
CA 8.8  --  8.2*  --  8.3*  --   
MG  --   --   --   --   --  1.7 ALB 3.4  --  2.4*  --  2.8*  --   
SGOT 43*  --  48*  --  46*  --   
ALT 23  --  23  --  24  --   
 < > = values in this interval not displayed. Intake/Output Summary (Last 24 hours) at 8/8/2019 5578 Last data filed at 8/8/2019 0600 Gross per 24 hour Intake 2200 ml Output 2100 ml Net 100 ml Anthropometrics: Ht Readings from Last 1 Encounters:  
08/04/19 4' 6\" (1.372 m) Last 3 Recorded Weights in this Encounter 08/04/19 0600 08/05/19 0247 08/06/19 0459 Weight: 66.3 kg (146 lb 2.6 oz) 66.3 kg (146 lb 3.4 oz) 69.9 kg (154 lb 1.6 oz) Body mass index is 37.16 kg/m². Obese, Class II Weight History: 56 lb, 29% net weight loss x 3 years and fluctuations in weight PTA per chart hx review; patient reports weight loss but unsure of amount or time frame Weight Metrics 8/6/2019 7/17/2019 5/6/2019 12/24/2018 12/3/2018 9/27/2016 Weight 154 lb 1.6 oz 130 lb 116 lb 12.8 oz 116 lb 115 lb 193 lb BMI 37.16 kg/m2 25.39 kg/m2 22.81 kg/m2 22.65 kg/m2 22.46 kg/m2 37.69 kg/m2 Admitting Diagnosis: Hypokalemia [E87.6] Jaundice [R17] Pertinent PMHx: pancreatic cancer s/p whipple procedure on 4/19, DM, HTN Education Needs:        [x] None identified  [] Identified - Not appropriate at this time  []  Identified and addressed - refer to education log Learning Limitations:   [] None identified  [x] Identified: altered mentation Cultural, Gnosticist & ethnic food preferences:  [x] None identified    [] Identified and addressed ESTIMATED NUTRITION NEEDS:  
 
Calories: 3043-0273 kcal (HBEx1.2-1.35) based on  [x] Actual BW 62 kg      [] IBW Protein: 62-93 gm (1-1.5 gm/kg) based on  [x] Actual BW      [] IBW Fluid: 1 mL/kcal 
  
MONITORING & EVALUATION:  
 
Nutrition Goal(s): 1. Provide nutrition intervention as appropriate with goals of care for the next 5-7 days. Outcome:  [x] Met/Ongoing    [] Progressing towards goal    [] Not progressing towards goal    [] New/Initial goal 
 
Monitoring:   [x] Food and beverage intake   [x] Diet order   [x] Nutrition-focused physical findings   [x] Treatment/therapy   [] Weight   [] Enteral nutrition intake Previous Recommendations (for follow-up assessments only):     [x]   Implemented       []   Not Implemented (RD to address)      [] No Longer Appropriate     [] No Recommendation Made Discharge Planning: oral diet as tolerated for comfort, per SLP recommendations [x] Participated in care planning, discharge planning, & interdisciplinary rounds as appropriate Alvarado Hood RD, [facility] Pager: [phone]

## 2019-08-08 NOTE — PROGRESS NOTES
Called PJ of [facility] and she stated to set up transport  time for 3pm today. She is still [facility] to get [facility] Notified Dr. Mat Gaines. 1220:  Called and left message for PJ to call me back when straight with [id number]. 
1411:   Per PJ she is good with [id number] and pt can still come at 3pm.  Notified Dr. Mat Gaines. [address] [facility] stated she will ask Kwame Giles to call me. 
3951:  Discharge summary sent to [facility] via "[employer] 26. Notified PJ. Left a message for pt's sister in-law Rena Poster. Informed [facility] [place] of transport arrangement. Transport company will be calling nurses station for ETA. Please see Lawanda Trivedi of Case Management notes. Informed PJ of [facility] transport arrangements. Per Saurav Castro of [id number], pt is good to go. She already spoke with PJ. Informed pt's nurse fatimah to [facility] [phone] to notify them of pt's  time when available. Alana Olsen, CORINNEN RN Care Management Pager: [phone]

## 2019-08-08 NOTE — PROGRESS NOTES
Problem: Pressure Injury - Risk of 
Goal: *Prevention of pressure injury Description Document Tyson Scale and appropriate interventions in the flowsheet. Outcome: Progressing Towards Goal 
Note:  
Pressure Injury Interventions: 
Sensory Interventions: Assess changes in LOC, Assess need for specialty bed, Avoid rigorous massage over bony prominences, Float heels, Keep linens dry and wrinkle-free, Minimize linen layers, Monitor skin under medical devices, Pad between skin to skin, Turn and reposition approx. every two hours (pillows and wedges if needed) Moisture Interventions: Absorbent underpads, Apply protective barrier, creams and emollients, Check for incontinence Q2 hours and as needed, Internal/External fecal devices, Internal/External urinary devices, Minimize layers, Moisture barrier Activity Interventions: Increase time out of bed, Pressure redistribution bed/mattress(bed type) Mobility Interventions: HOB 30 degrees or less, Pressure redistribution bed/mattress (bed type), Turn and reposition approx. every two hours(pillow and wedges) Nutrition Interventions: Document food/fluid/supplement intake Friction and Shear Interventions: HOB 30 degrees or less, Minimize layers, Apply protective barrier, creams and emollients, Transferring/repositioning devices Problem: Patient Education: Go to Patient Education Activity Goal: Patient/Family Education Outcome: Progressing Towards Goal 
  
Problem: Falls - Risk of 
Goal: *Absence of Falls Description Document Marcelina Ambrosio Fall Risk and appropriate interventions in the flowsheet. Outcome: Progressing Towards Goal 
Note:  
Fall Risk Interventions: 
Mobility Interventions: Bed/chair exit alarm, Strengthening exercises (ROM-active/passive) Mentation Interventions: Door open when patient unattended, Adequate sleep, hydration, pain control, More frequent rounding, Reorient patient, Room close to nurse's station, Update white board, Toileting rounds Medication Interventions: Bed/chair exit alarm, Evaluate medications/consider consulting pharmacy Elimination Interventions: Call light in reach, Patient to call for help with toileting needs, Toileting schedule/hourly rounds History of Falls Interventions: Bed/chair exit alarm, Evaluate medications/consider consulting pharmacy, Room close to nurse's station Problem: Patient Education: Go to Patient Education Activity Goal: Patient/Family Education Outcome: Progressing Towards Goal 
  
Problem: Pain Goal: *Control of Pain Outcome: Progressing Towards Goal 
  
Problem: General Infection Care Plan (Adult and Pediatric) Goal: Improvement in signs and symptoms of infection Outcome: Progressing Towards Goal 
Goal: *Optimize nutritional status Outcome: Progressing Towards Goal 
  
Problem: Nutrition Deficit Goal: *Optimize nutritional status Outcome: Progressing Towards Goal 
  
Problem: Altered Thought Process (Adult/Pediatric) Goal: *STG: Remains safe in hospital 
Outcome: Progressing Towards Goal 
Goal: Interventions Outcome: Progressing Towards Goal 
  
Problem: Patient Education: Go to Patient Education Activity Goal: Patient/Family Education Outcome: Progressing Towards Goal 
  
Problem: Patient Education: Go to Patient Education Activity Goal: Patient/Family Education Outcome: Progressing Towards Goal

## 2019-08-08 NOTE — PROGRESS NOTES
Per Charleen LIU) called [facility] transportation at [phone] to schedule stretcher transport to [facility] with [facility] and received confirmation number [id number]. Requested [facility] as provider, per [employer] the dispatch will call the nurse's station with the name of the transport company and the ETA. Informed Charleen Mcfarland of transportation arrangements.

## 2019-08-08 NOTE — PROGRESS NOTES
1652 - Pt supine in bed at this time and awaiting transfer to [facility] with hospice following. Attempted to instruct pt in LE therex to maintain strength, but pt yelling out in pain to movement. Pt has poor insight into her medical condition seeming confused about DC and transfer. PT tx held as it is currently not appropriate.

## 2019-08-08 NOTE — DISCHARGE SUMMARY
Discharge Summary Patient: Swathi Cordoba MRN: [id number]  CSN: [id number] YOB: 1955  Age: 61 y.o. Sex: female DOA: 7/25/2019 LOS:  LOS: 14 days   Discharge Date:   
 
Admission Diagnoses: Hypokalemia [E87.6] Jaundice [R17] Discharge Diagnoses: 
Obstructive Hyperbilirubinemia - unclear etiology Failure to thrive Elevated lactic acid Coagulopathy Pancytopenia H/o Pancreatic cancer s/p Whipple's procedure with chemo & radiation Chr hypotension Discharge Condition: Stable Consults: Gastroenterology and Hematology/Oncology PHYSICAL EXAM 
Visit Vitals /81 (BP 1 Location: Left arm, BP Patient Position: At rest) Pulse (!) 103 Temp 97.5 °F (36.4 °C) Resp 14 Ht 4' 6\" (1.372 m) Wt 69.9 kg (154 lb 1.6 oz) SpO2 96% Breastfeeding? No  
BMI 37.16 kg/m² General: Alert, cooperative, no acute distress HEENT: PERRLA, EOMI. Anicteric sclerae. Lungs:  CTA Bilaterally. No Wheezing/Rhonchi/Rales. Heart:  Regular rate and Rhythm. Abdomen: Soft, Non distended, Non tender. + Bowel sounds. Extremities: No edema/ cyanosis/ clubbing Psych:   Good insight. Not anxious or agitated. Neurologic:  AA oriented X 3. Moves all extremities. HPI: 
Swathi Cordoba is a 61 y.o. female with the medical history below. She was at follow up appointment with Dr Marilyn Hill and advised to go to [address] [place] [address] ED for abnormal lab values. In the ER she was jaundiced and globally weak with pancytopenia, elevated bilirubin 12.1 with elevated LFT's, K+ 2.7, albumin 1.4, Ca++ 7.9 and lactic acid 2.82. EKG was NSR 97. She was hypotensive 87/53. Discussion in ED with GI and patient admitted to telemetry at [facility].  
 
 
Hospital Course:  
Pt has had a complicated hospital course - she was admitted for obstructive hyperbilirubinemia - seen by GI - advised to get MRCP - results attached below - advised to then get CT Abd & Pelvis which was done - results also attached Pt's T Bili has continues to remain elevated Pt was also seen by hem Onc who consulted Hepatology Dr Elzbieta Cross - advised Vit K 10mg x3 days - with no improvement in T Bili & coagulopathy Advised that if T bili continues to remain elevated - she should consider comfort care I discussed with sister in law - who was agreeable to comfort DDNR signed Hospice consulted - pt is being discharged to [address] with [facility] on board She is DNR Imaging: MRCP IMPRESSION: 
  
1. Technically quite suboptimal study due to motion artifact and anatomic 
alteration, probably related to prior surgery. 2.  Multiphasic CT with contrast may be able to provide more detailed anatomic 
information in light of inherent technical difficulties of MRCP in the patient's 
current condition. 3.  Pronounced hepatosteatosis. 4.  Suspected dilated cystic duct. CT Abd & Pelvis IMPRESSION: 
  
No evidence of biliary duct dilatation. Small amount of intrahepatic 
pneumobilia, status post Whipple. Correlate clinically with surgical history. 
-Profound hepatic steatosis. -A nonmasslike stellate-like density in the left hepatic lobe, possibly fibrosis 
or vascular lesion, similar to outside CT from 7/3/2019. Recommend continued 
attention on follow-up imaging. 
  
Pancreatic atrophy with a suspected pancreatic stent through a possible 
pancreatic jejunostomy. Again, recommend correlation with surgical history. Probable collapsed bowel along the inferior margin of the right hepatic lobe, 
however underlying lesion difficult to completely exclude. Recommend attention 
on follow-up imaging. 
-No definite pancreatic mass. 
  
Some radiodense material within the renal collecting systems on precontrast 
imaging.  Recommend correlation with urinalysis. 
  
Areas of gastric, small bowel, and large bowel wall thickening, possibly a 
 nonspecific gastroenteritis, or may be related to volume overload in this 
setting. 
-Evidence of anasarca with moderate volume ascites, patchy mesenteric edema, 
right greater than left pleural effusions. 
  
See additional details above. Procedures:  
CT guided Liver Bx Discharge Medications:    
Current Discharge Medication List  
  
START taking these medications Details  
lactulose (CHRONULAC) 10 gram/15 mL solution Take 45 mL by mouth four (4) times daily. Qty: 500 mL, Refills: 0 CONTINUE these medications which have CHANGED Details  
busPIRone (BUSPAR) 10 mg tablet Take 1 Tab by mouth three (3) times daily. Qty: 30 Tab, Refills: 0 STOP taking these medications  
  
 cholecalciferol (VITAMIN D3) 1,000 unit tablet Comments:  
Reason for Stopping:   
   
 potassium chloride SR (KLOR-CON 10) 10 mEq tablet Comments:  
Reason for Stopping:   
   
 loperamide (IMMODIUM) 2 mg tablet Comments:  
Reason for Stopping:   
   
 metoclopramide HCl (REGLAN) 10 mg tablet Comments:  
Reason for Stopping:   
   
 Omeprazole delayed release (PRILOSEC D/R) 20 mg tablet Comments:  
Reason for Stopping:   
   
 JARDIANCE 10 mg tablet Comments:  
Reason for Stopping:   
   
  
 
 
Current Facility-Administered Medications:  
  potassium chloride 20 mEq in 100 ml IVPB, 20 mEq, IntraVENous, Q2H, Cally Morgan MD, Last Rate: 50 mL/hr at 08/08/19 1334, 20 mEq at 08/08/19 1334 
  dextrose (D50W) injection syrg 12.5-25 g, 12.5-25 g, IntraVENous, PRN, Olga Lidia Mckeon MD 
  lactulose (CHRONULAC) 10 gram/15 mL solution 45 mL, 30 g, Oral, QID, Olga Lidia Mckeon MD, 45 mL at 08/08/19 1332   acetaminophen (TYLENOL) tablet 500 mg, 500 mg, Oral, PRN, Rhona Wilson MD, 500 mg at 08/04/19 1286   diphenhydrAMINE (BENADRYL) capsule 25 mg, 25 mg, Oral, PRN, Rhona Wilson MD, 25 mg at 08/04/19 1593   sodium chloride (NS) flush 5-40 mL, 5-40 mL, IntraVENous, Q8H, Peg Beckford MD, 10 mL at 08/08/19 1339 
  sodium chloride (NS) flush 5-40 mL, 5-40 mL, IntraVENous, PRN, Jaquan Foster MD 
  gelatin adsorbable (GELFOAM) 12-7 mm sponge 1 Each, 1 Each, Topical, PRN, Peg Beckford MD, 1 Each at 08/01/19 1256   morphine injection 1 mg, 1 mg, IntraVENous, Q8H PRN, Eduardo Nunn MD, 1 mg at 07/29/19 2141 
  busPIRone (BUSPAR) tablet 10 mg, 10 mg, Oral, TID, Ace Ban A, DO, 10 mg at 08/08/19 2934   pantoprazole (PROTONIX) 40 mg in sodium chloride 0.9% 10 mL injection, 40 mg, IntraVENous, Q12H, Ace Ban A, DO, 40 mg at 08/08/19 9107   insulin lispro (HUMALOG) injection, , SubCUTAneous, AC&HS, Jerrod Clinton DO, Stopped at 08/08/19 0730 
  glucose chewable tablet 16 g, 4 Tab, Oral, PRN, Jerrod Clinton DO 
  glucagon (GLUCAGEN) injection 1 mg, 1 mg, IntraMUSCular, PRN, Jerrod Clinton DO 
  furosemide (LASIX) injection 40 mg, 40 mg, IntraVENous, PRN, Ace Ban A, DO 
· It is important that you take the medication exactly as they are prescribed. · Keep your medication in the bottles provided by the pharmacist and keep a list of the medication names, dosages, and times to be taken in your wallet. · Do not take other medications without consulting your doctor. Minutes spent on discharge: 55 minutes spent coordinating this discharge (review instructions/follow-up, prescriptions, preparing report for sign off) Neville Rivera MD 
8/8/2019 2:42 PM

## 2022-11-02 NOTE — TELEPHONE ENCOUNTER
Requested Prescriptions     Pending Prescriptions Disp Refills    busPIRone (BUSPAR) 10 mg tablet 90 Tab 1     Sig: Take 1 Tab by mouth three (3) times daily.    Patient states she is almost out of this medication Acitretin Counseling:  I discussed with the patient the risks of acitretin including but not limited to hair loss, dry lips/skin/eyes, liver damage, hyperlipidemia, depression/suicidal ideation, photosensitivity.  Serious rare side effects can include but are not limited to pancreatitis, pseudotumor cerebri, bony changes, clot formation/stroke/heart attack.  Patient understands that alcohol is contraindicated since it can result in liver toxicity and significantly prolong the elimination of the drug by many years.

## 2024-04-29 NOTE — TELEPHONE ENCOUNTER
Dakotah Gonzalez from [facility] called requesting order for rollator for [place] [facility]
Request will be determine or written  when provider return
Not applicable